# Patient Record
Sex: FEMALE | Race: WHITE | NOT HISPANIC OR LATINO | Employment: FULL TIME | URBAN - METROPOLITAN AREA
[De-identification: names, ages, dates, MRNs, and addresses within clinical notes are randomized per-mention and may not be internally consistent; named-entity substitution may affect disease eponyms.]

---

## 2017-11-02 ENCOUNTER — HOSPITAL ENCOUNTER (OUTPATIENT)
Dept: ULTRASOUND IMAGING | Facility: HOSPITAL | Age: 27
Discharge: HOME | End: 2017-11-02
Attending: PHYSICIAN ASSISTANT

## 2017-11-02 ENCOUNTER — OFFICE VISIT - HEALTHEAST (OUTPATIENT)
Dept: FAMILY MEDICINE | Facility: CLINIC | Age: 27
End: 2017-11-02

## 2017-11-02 DIAGNOSIS — Z30.431 IUD CHECK UP: ICD-10-CM

## 2017-11-02 DIAGNOSIS — Z97.5 IUD (INTRAUTERINE DEVICE) IN PLACE: ICD-10-CM

## 2017-11-02 DIAGNOSIS — R87.613 PAPANICOLAOU SMEAR OF CERVIX WITH HIGH GRADE SQUAMOUS INTRAEPITHELIAL LESION (HGSIL): ICD-10-CM

## 2017-11-02 DIAGNOSIS — N89.8 VAGINAL DISCHARGE: ICD-10-CM

## 2017-11-08 LAB
HPV INTERPRETATION - HISTORICAL: NORMAL
HPV INTERPRETER - HISTORICAL: NORMAL

## 2017-11-10 LAB
BKR LAB AP ABNORMAL BLEEDING: NO
BKR LAB AP BIRTH CONTROL/HORMONES: ABNORMAL
BKR LAB AP CERVICAL APPEARANCE: NORMAL
BKR LAB AP GYN ADEQUACY: ABNORMAL
BKR LAB AP GYN INTERPRETATION: ABNORMAL
BKR LAB AP GYN OTHER FINDINGS: ABNORMAL
BKR LAB AP HPV REFLEX: ABNORMAL
BKR LAB AP LMP: ABNORMAL
BKR LAB AP PATIENT STATUS: ABNORMAL
BKR LAB AP PREVIOUS ABNORMAL: ABNORMAL
BKR LAB AP PREVIOUS NORMAL: 2015
HIGH RISK?: NO
PATH REPORT.COMMENTS IMP SPEC: ABNORMAL
RESULT FLAG (HE HISTORICAL CONVERSION): ABNORMAL

## 2017-11-17 ENCOUNTER — AMBULATORY - HEALTHEAST (OUTPATIENT)
Dept: FAMILY MEDICINE | Facility: CLINIC | Age: 27
End: 2017-11-17

## 2017-11-17 ENCOUNTER — COMMUNICATION - HEALTHEAST (OUTPATIENT)
Dept: FAMILY MEDICINE | Facility: CLINIC | Age: 27
End: 2017-11-17

## 2017-11-17 DIAGNOSIS — R87.619 ABNORMAL PAP SMEAR OF CERVIX: ICD-10-CM

## 2017-11-29 ENCOUNTER — RECORDS - HEALTHEAST (OUTPATIENT)
Dept: ADMINISTRATIVE | Facility: OTHER | Age: 27
End: 2017-11-29

## 2017-12-06 ENCOUNTER — RECORDS - HEALTHEAST (OUTPATIENT)
Dept: ADMINISTRATIVE | Facility: OTHER | Age: 27
End: 2017-12-06

## 2017-12-18 ENCOUNTER — RECORDS - HEALTHEAST (OUTPATIENT)
Dept: ADMINISTRATIVE | Facility: OTHER | Age: 27
End: 2017-12-18

## 2017-12-26 ASSESSMENT — MIFFLIN-ST. JEOR: SCORE: 1373.6

## 2017-12-27 ENCOUNTER — ANESTHESIA - HEALTHEAST (OUTPATIENT)
Dept: SURGERY | Facility: HOSPITAL | Age: 27
End: 2017-12-27

## 2017-12-28 ENCOUNTER — SURGERY - HEALTHEAST (OUTPATIENT)
Dept: SURGERY | Facility: HOSPITAL | Age: 27
End: 2017-12-28

## 2018-01-16 ASSESSMENT — MIFFLIN-ST. JEOR: SCORE: 1373.6

## 2018-01-17 ENCOUNTER — ANESTHESIA - HEALTHEAST (OUTPATIENT)
Dept: SURGERY | Facility: HOSPITAL | Age: 28
End: 2018-01-17

## 2018-01-18 ENCOUNTER — SURGERY - HEALTHEAST (OUTPATIENT)
Dept: SURGERY | Facility: HOSPITAL | Age: 28
End: 2018-01-18

## 2018-01-18 ASSESSMENT — MIFFLIN-ST. JEOR: SCORE: 1365.66

## 2020-01-14 ENCOUNTER — OFFICE VISIT - HEALTHEAST (OUTPATIENT)
Dept: FAMILY MEDICINE | Facility: CLINIC | Age: 30
End: 2020-01-14

## 2020-01-14 DIAGNOSIS — R53.82 CHRONIC FATIGUE: ICD-10-CM

## 2020-01-14 DIAGNOSIS — I10 BENIGN ESSENTIAL HYPERTENSION: ICD-10-CM

## 2020-01-14 DIAGNOSIS — F41.9 ANXIETY: ICD-10-CM

## 2020-01-14 DIAGNOSIS — J45.20 MILD INTERMITTENT ASTHMA WITHOUT COMPLICATION: ICD-10-CM

## 2020-01-14 LAB
ALBUMIN SERPL-MCNC: 3.8 G/DL (ref 3.5–5)
ALP SERPL-CCNC: 96 U/L (ref 45–120)
ALT SERPL W P-5'-P-CCNC: 24 U/L (ref 0–45)
ANION GAP SERPL CALCULATED.3IONS-SCNC: 11 MMOL/L (ref 5–18)
AST SERPL W P-5'-P-CCNC: 17 U/L (ref 0–40)
BASOPHILS # BLD AUTO: 0.1 THOU/UL (ref 0–0.2)
BASOPHILS NFR BLD AUTO: 1 % (ref 0–2)
BILIRUB SERPL-MCNC: 0.4 MG/DL (ref 0–1)
BUN SERPL-MCNC: 17 MG/DL (ref 8–22)
CALCIUM SERPL-MCNC: 9.1 MG/DL (ref 8.5–10.5)
CHLORIDE BLD-SCNC: 104 MMOL/L (ref 98–107)
CO2 SERPL-SCNC: 24 MMOL/L (ref 22–31)
CREAT SERPL-MCNC: 0.72 MG/DL (ref 0.6–1.1)
EOSINOPHIL # BLD AUTO: 0.3 THOU/UL (ref 0–0.4)
EOSINOPHIL NFR BLD AUTO: 4 % (ref 0–6)
ERYTHROCYTE [DISTWIDTH] IN BLOOD BY AUTOMATED COUNT: 11.4 % (ref 11–14.5)
FSH SERPL-ACNC: 9.2 MIU/ML
GFR SERPL CREATININE-BSD FRML MDRD: >60 ML/MIN/1.73M2
GLUCOSE BLD-MCNC: 92 MG/DL (ref 70–125)
HCT VFR BLD AUTO: 35.5 % (ref 35–47)
HGB BLD-MCNC: 11.9 G/DL (ref 12–16)
LYMPHOCYTES # BLD AUTO: 2.5 THOU/UL (ref 0.8–4.4)
LYMPHOCYTES NFR BLD AUTO: 37 % (ref 20–40)
MCH RBC QN AUTO: 28.9 PG (ref 27–34)
MCHC RBC AUTO-ENTMCNC: 33.4 G/DL (ref 32–36)
MCV RBC AUTO: 86 FL (ref 80–100)
MONOCYTES # BLD AUTO: 0.6 THOU/UL (ref 0–0.9)
MONOCYTES NFR BLD AUTO: 9 % (ref 2–10)
NEUTROPHILS # BLD AUTO: 3.4 THOU/UL (ref 2–7.7)
NEUTROPHILS NFR BLD AUTO: 50 % (ref 50–70)
PLATELET # BLD AUTO: 250 THOU/UL (ref 140–440)
PMV BLD AUTO: 9.3 FL (ref 7–10)
POTASSIUM BLD-SCNC: 3.4 MMOL/L (ref 3.5–5)
PROT SERPL-MCNC: 7.4 G/DL (ref 6–8)
RBC # BLD AUTO: 4.1 MILL/UL (ref 3.8–5.4)
SODIUM SERPL-SCNC: 139 MMOL/L (ref 136–145)
TSH SERPL DL<=0.005 MIU/L-ACNC: 0.4 UIU/ML (ref 0.3–5)
VIT B12 SERPL-MCNC: 663 PG/ML (ref 213–816)
WBC: 6.7 THOU/UL (ref 4–11)

## 2020-01-14 ASSESSMENT — PATIENT HEALTH QUESTIONNAIRE - PHQ9: SUM OF ALL RESPONSES TO PHQ QUESTIONS 1-9: 13

## 2020-01-14 ASSESSMENT — MIFFLIN-ST. JEOR: SCORE: 1460.91

## 2020-01-15 LAB — 25(OH)D3 SERPL-MCNC: 12.7 NG/ML (ref 30–80)

## 2020-02-04 ENCOUNTER — AMBULATORY - HEALTHEAST (OUTPATIENT)
Dept: FAMILY MEDICINE | Facility: CLINIC | Age: 30
End: 2020-02-04

## 2020-02-04 ENCOUNTER — COMMUNICATION - HEALTHEAST (OUTPATIENT)
Dept: FAMILY MEDICINE | Facility: CLINIC | Age: 30
End: 2020-02-04

## 2020-02-04 DIAGNOSIS — E55.9 VITAMIN D DEFICIENCY: ICD-10-CM

## 2020-03-09 ENCOUNTER — OFFICE VISIT - HEALTHEAST (OUTPATIENT)
Dept: FAMILY MEDICINE | Facility: CLINIC | Age: 30
End: 2020-03-09

## 2020-03-09 DIAGNOSIS — E55.9 VITAMIN D DEFICIENCY: ICD-10-CM

## 2020-03-09 DIAGNOSIS — G43.109 MIGRAINE WITH AURA AND WITHOUT STATUS MIGRAINOSUS, NOT INTRACTABLE: ICD-10-CM

## 2020-03-09 DIAGNOSIS — G56.03 BILATERAL CARPAL TUNNEL SYNDROME: ICD-10-CM

## 2020-03-25 ENCOUNTER — COMMUNICATION - HEALTHEAST (OUTPATIENT)
Dept: SCHEDULING | Facility: CLINIC | Age: 30
End: 2020-03-25

## 2020-03-25 ENCOUNTER — AMBULATORY - HEALTHEAST (OUTPATIENT)
Dept: FAMILY MEDICINE | Facility: CLINIC | Age: 30
End: 2020-03-25

## 2020-03-25 DIAGNOSIS — I10 BENIGN ESSENTIAL HYPERTENSION: ICD-10-CM

## 2020-05-04 ENCOUNTER — OFFICE VISIT - HEALTHEAST (OUTPATIENT)
Dept: FAMILY MEDICINE | Facility: CLINIC | Age: 30
End: 2020-05-04

## 2020-05-04 ENCOUNTER — COMMUNICATION - HEALTHEAST (OUTPATIENT)
Dept: SCHEDULING | Facility: CLINIC | Age: 30
End: 2020-05-04

## 2020-05-04 DIAGNOSIS — N39.0 URINARY TRACT INFECTION WITHOUT HEMATURIA, SITE UNSPECIFIED: ICD-10-CM

## 2020-05-04 DIAGNOSIS — B37.31 YEAST VAGINITIS: ICD-10-CM

## 2020-05-04 DIAGNOSIS — G43.109 MIGRAINE WITH AURA AND WITHOUT STATUS MIGRAINOSUS, NOT INTRACTABLE: ICD-10-CM

## 2020-05-15 ENCOUNTER — COMMUNICATION - HEALTHEAST (OUTPATIENT)
Dept: FAMILY MEDICINE | Facility: CLINIC | Age: 30
End: 2020-05-15

## 2020-05-15 DIAGNOSIS — F41.9 ANXIETY: ICD-10-CM

## 2020-08-20 ENCOUNTER — COMMUNICATION - HEALTHEAST (OUTPATIENT)
Dept: SCHEDULING | Facility: CLINIC | Age: 30
End: 2020-08-20

## 2020-08-22 ENCOUNTER — AMBULATORY - HEALTHEAST (OUTPATIENT)
Dept: OTHER | Facility: CLINIC | Age: 30
End: 2020-08-22

## 2020-08-24 ENCOUNTER — COMMUNICATION - HEALTHEAST (OUTPATIENT)
Dept: FAMILY MEDICINE | Facility: CLINIC | Age: 30
End: 2020-08-24

## 2020-08-25 ENCOUNTER — COMMUNICATION - HEALTHEAST (OUTPATIENT)
Dept: FAMILY MEDICINE | Facility: CLINIC | Age: 30
End: 2020-08-25

## 2020-08-28 ENCOUNTER — OFFICE VISIT - HEALTHEAST (OUTPATIENT)
Dept: FAMILY MEDICINE | Facility: CLINIC | Age: 30
End: 2020-08-28

## 2020-08-28 DIAGNOSIS — R10.12 LUQ ABDOMINAL PAIN: ICD-10-CM

## 2020-08-28 DIAGNOSIS — K52.9 ACUTE COLITIS: ICD-10-CM

## 2020-08-28 DIAGNOSIS — R10.9 RECURRENT ABDOMINAL PAIN: ICD-10-CM

## 2020-08-28 DIAGNOSIS — I10 BENIGN ESSENTIAL HYPERTENSION: ICD-10-CM

## 2020-08-28 ASSESSMENT — PATIENT HEALTH QUESTIONNAIRE - PHQ9: SUM OF ALL RESPONSES TO PHQ QUESTIONS 1-9: 10

## 2020-09-14 ENCOUNTER — COMMUNICATION - HEALTHEAST (OUTPATIENT)
Dept: FAMILY MEDICINE | Facility: CLINIC | Age: 30
End: 2020-09-14

## 2020-09-14 DIAGNOSIS — F41.9 ANXIETY: ICD-10-CM

## 2020-09-16 ENCOUNTER — RECORDS - HEALTHEAST (OUTPATIENT)
Dept: ADMINISTRATIVE | Facility: OTHER | Age: 30
End: 2020-09-16

## 2020-10-07 ENCOUNTER — OFFICE VISIT - HEALTHEAST (OUTPATIENT)
Dept: FAMILY MEDICINE | Facility: CLINIC | Age: 30
End: 2020-10-07

## 2020-10-07 DIAGNOSIS — R87.613 PAPANICOLAOU SMEAR OF CERVIX WITH HIGH GRADE SQUAMOUS INTRAEPITHELIAL LESION (HGSIL): ICD-10-CM

## 2020-10-07 DIAGNOSIS — N89.8 VAGINAL DISCHARGE: ICD-10-CM

## 2020-10-07 DIAGNOSIS — R33.9 URINARY RETENTION: ICD-10-CM

## 2020-10-07 DIAGNOSIS — Z90.710 H/O TOTAL HYSTERECTOMY: ICD-10-CM

## 2020-10-07 LAB
ALBUMIN UR-MCNC: ABNORMAL MG/DL
APPEARANCE UR: ABNORMAL
BILIRUB UR QL STRIP: NEGATIVE
CLUE CELLS: NORMAL
COLOR UR AUTO: YELLOW
GLUCOSE UR STRIP-MCNC: NEGATIVE MG/DL
HGB UR QL STRIP: NEGATIVE
KETONES UR STRIP-MCNC: NEGATIVE MG/DL
LEUKOCYTE ESTERASE UR QL STRIP: ABNORMAL
NITRATE UR QL: NEGATIVE
PH UR STRIP: 5 [PH] (ref 5–8)
SP GR UR STRIP: >=1.03 (ref 1–1.03)
TRICHOMONAS, WET PREP: NORMAL
UROBILINOGEN UR STRIP-ACNC: ABNORMAL
YEAST, WET PREP: NORMAL

## 2020-10-08 LAB — BACTERIA SPEC CULT: NO GROWTH

## 2020-10-09 ENCOUNTER — AMBULATORY - HEALTHEAST (OUTPATIENT)
Dept: FAMILY MEDICINE | Facility: CLINIC | Age: 30
End: 2020-10-09

## 2020-10-09 DIAGNOSIS — B96.89 BACTERIAL VAGINOSIS: ICD-10-CM

## 2020-10-09 DIAGNOSIS — N76.0 BACTERIAL VAGINOSIS: ICD-10-CM

## 2021-01-18 ENCOUNTER — COMMUNICATION - HEALTHEAST (OUTPATIENT)
Dept: FAMILY MEDICINE | Facility: CLINIC | Age: 31
End: 2021-01-18

## 2021-01-18 DIAGNOSIS — F41.9 ANXIETY: ICD-10-CM

## 2021-01-19 ENCOUNTER — OFFICE VISIT - HEALTHEAST (OUTPATIENT)
Dept: FAMILY MEDICINE | Facility: CLINIC | Age: 31
End: 2021-01-19

## 2021-01-19 ENCOUNTER — COMMUNICATION - HEALTHEAST (OUTPATIENT)
Dept: FAMILY MEDICINE | Facility: CLINIC | Age: 31
End: 2021-01-19

## 2021-01-19 DIAGNOSIS — G43.109 MIGRAINE WITH AURA AND WITHOUT STATUS MIGRAINOSUS, NOT INTRACTABLE: ICD-10-CM

## 2021-01-19 DIAGNOSIS — F41.9 ANXIETY: ICD-10-CM

## 2021-01-19 DIAGNOSIS — Z00.00 ROUTINE GENERAL MEDICAL EXAMINATION AT A HEALTH CARE FACILITY: ICD-10-CM

## 2021-01-19 DIAGNOSIS — R00.2 PALPITATIONS: ICD-10-CM

## 2021-01-19 DIAGNOSIS — R42 VERTIGO: ICD-10-CM

## 2021-01-19 DIAGNOSIS — R07.1 CHEST PAIN ON BREATHING: ICD-10-CM

## 2021-01-19 DIAGNOSIS — Z86.2 HISTORY OF THROMBOCYTOPENIA: ICD-10-CM

## 2021-01-19 DIAGNOSIS — R53.83 LOW ENERGY: ICD-10-CM

## 2021-01-19 DIAGNOSIS — F32.2 SEVERE MAJOR DEPRESSION (H): ICD-10-CM

## 2021-01-19 DIAGNOSIS — Z63.79 STRESSFUL LIFE EVENTS AFFECTING FAMILY AND HOUSEHOLD: ICD-10-CM

## 2021-01-19 LAB
ALBUMIN SERPL-MCNC: 4.4 G/DL (ref 3.5–5)
ALP SERPL-CCNC: 73 U/L (ref 45–120)
ALT SERPL W P-5'-P-CCNC: 17 U/L (ref 0–45)
ANION GAP SERPL CALCULATED.3IONS-SCNC: 10 MMOL/L (ref 5–18)
AST SERPL W P-5'-P-CCNC: 14 U/L (ref 0–40)
BASOPHILS # BLD AUTO: 0.1 THOU/UL (ref 0–0.2)
BASOPHILS NFR BLD AUTO: 1 % (ref 0–2)
BILIRUB SERPL-MCNC: 0.3 MG/DL (ref 0–1)
BUN SERPL-MCNC: 19 MG/DL (ref 8–22)
CALCIUM SERPL-MCNC: 8.8 MG/DL (ref 8.5–10.5)
CHLORIDE BLD-SCNC: 106 MMOL/L (ref 98–107)
CHOLEST SERPL-MCNC: 171 MG/DL
CO2 SERPL-SCNC: 21 MMOL/L (ref 22–31)
CREAT SERPL-MCNC: 0.82 MG/DL (ref 0.6–1.1)
EOSINOPHIL # BLD AUTO: 0.2 THOU/UL (ref 0–0.4)
EOSINOPHIL NFR BLD AUTO: 2 % (ref 0–6)
ERYTHROCYTE [DISTWIDTH] IN BLOOD BY AUTOMATED COUNT: 11.1 % (ref 11–14.5)
FASTING STATUS PATIENT QL REPORTED: NO
GFR SERPL CREATININE-BSD FRML MDRD: >60 ML/MIN/1.73M2
GLUCOSE BLD-MCNC: 83 MG/DL (ref 70–125)
HCT VFR BLD AUTO: 39.3 % (ref 35–47)
HDLC SERPL-MCNC: 48 MG/DL
HGB BLD-MCNC: 13.5 G/DL (ref 12–16)
LDLC SERPL CALC-MCNC: 98 MG/DL
LYMPHOCYTES # BLD AUTO: 3 THOU/UL (ref 0.8–4.4)
LYMPHOCYTES NFR BLD AUTO: 37 % (ref 20–40)
MCH RBC QN AUTO: 30.6 PG (ref 27–34)
MCHC RBC AUTO-ENTMCNC: 34.3 G/DL (ref 32–36)
MCV RBC AUTO: 89 FL (ref 80–100)
MONOCYTES # BLD AUTO: 0.5 THOU/UL (ref 0–0.9)
MONOCYTES NFR BLD AUTO: 7 % (ref 2–10)
NEUTROPHILS # BLD AUTO: 4.3 THOU/UL (ref 2–7.7)
NEUTROPHILS NFR BLD AUTO: 54 % (ref 50–70)
PLATELET # BLD AUTO: 265 THOU/UL (ref 140–440)
PMV BLD AUTO: 8.7 FL (ref 7–10)
POTASSIUM BLD-SCNC: 4.1 MMOL/L (ref 3.5–5)
PROT SERPL-MCNC: 7.4 G/DL (ref 6–8)
RBC # BLD AUTO: 4.41 MILL/UL (ref 3.8–5.4)
SODIUM SERPL-SCNC: 137 MMOL/L (ref 136–145)
TRIGL SERPL-MCNC: 126 MG/DL
TSH SERPL DL<=0.005 MIU/L-ACNC: 0.71 UIU/ML (ref 0.3–5)
WBC: 8 THOU/UL (ref 4–11)

## 2021-01-19 ASSESSMENT — MIFFLIN-ST. JEOR: SCORE: 1460.91

## 2021-01-25 ENCOUNTER — OFFICE VISIT - HEALTHEAST (OUTPATIENT)
Dept: FAMILY MEDICINE | Facility: CLINIC | Age: 31
End: 2021-01-25

## 2021-01-25 ENCOUNTER — COMMUNICATION - HEALTHEAST (OUTPATIENT)
Dept: FAMILY MEDICINE | Facility: CLINIC | Age: 31
End: 2021-01-25

## 2021-01-25 DIAGNOSIS — J06.9 UPPER RESPIRATORY TRACT INFECTION, UNSPECIFIED TYPE: ICD-10-CM

## 2021-01-25 DIAGNOSIS — J45.20 MILD INTERMITTENT ASTHMA WITHOUT COMPLICATION: ICD-10-CM

## 2021-01-25 DIAGNOSIS — J02.9 PHARYNGITIS, UNSPECIFIED ETIOLOGY: ICD-10-CM

## 2021-01-26 ENCOUNTER — AMBULATORY - HEALTHEAST (OUTPATIENT)
Dept: FAMILY MEDICINE | Facility: CLINIC | Age: 31
End: 2021-01-26

## 2021-01-26 DIAGNOSIS — J06.9 UPPER RESPIRATORY TRACT INFECTION, UNSPECIFIED TYPE: ICD-10-CM

## 2021-01-26 DIAGNOSIS — J02.9 PHARYNGITIS, UNSPECIFIED ETIOLOGY: ICD-10-CM

## 2021-01-26 LAB — DEPRECATED S PYO AG THROAT QL EIA: ABNORMAL

## 2021-01-27 ENCOUNTER — COMMUNICATION - HEALTHEAST (OUTPATIENT)
Dept: SCHEDULING | Facility: CLINIC | Age: 31
End: 2021-01-27

## 2021-01-27 LAB
SARS-COV-2 PCR COMMENT: NORMAL
SARS-COV-2 RNA SPEC QL NAA+PROBE: NEGATIVE
SARS-COV-2 VIRUS SPECIMEN SOURCE: NORMAL

## 2021-01-28 ENCOUNTER — AMBULATORY - HEALTHEAST (OUTPATIENT)
Dept: CARDIOLOGY | Facility: CLINIC | Age: 31
End: 2021-01-28

## 2021-01-29 ENCOUNTER — COMMUNICATION - HEALTHEAST (OUTPATIENT)
Dept: CARDIOLOGY | Facility: CLINIC | Age: 31
End: 2021-01-29

## 2021-01-30 ENCOUNTER — HOSPITAL ENCOUNTER (OUTPATIENT)
Dept: MRI IMAGING | Facility: HOSPITAL | Age: 31
Discharge: HOME | End: 2021-01-30
Attending: FAMILY MEDICINE

## 2021-01-30 DIAGNOSIS — G43.109 MIGRAINE WITH AURA AND WITHOUT STATUS MIGRAINOSUS, NOT INTRACTABLE: ICD-10-CM

## 2021-01-30 DIAGNOSIS — R42 VERTIGO: ICD-10-CM

## 2021-02-01 ENCOUNTER — OFFICE VISIT - HEALTHEAST (OUTPATIENT)
Dept: CARDIOLOGY | Facility: CLINIC | Age: 31
End: 2021-02-01

## 2021-02-01 DIAGNOSIS — R00.0 SINUS TACHYCARDIA: ICD-10-CM

## 2021-02-01 DIAGNOSIS — R00.2 PALPITATIONS: ICD-10-CM

## 2021-02-01 DIAGNOSIS — Z87.891 PERSONAL HISTORY OF TOBACCO USE, PRESENTING HAZARDS TO HEALTH: ICD-10-CM

## 2021-02-01 DIAGNOSIS — R42 DIZZINESS: ICD-10-CM

## 2021-02-01 DIAGNOSIS — I10 BENIGN ESSENTIAL HYPERTENSION: ICD-10-CM

## 2021-02-01 DIAGNOSIS — F41.9 ANXIETY: ICD-10-CM

## 2021-02-01 ASSESSMENT — MIFFLIN-ST. JEOR: SCORE: 1451.84

## 2021-02-22 ENCOUNTER — HOSPITAL ENCOUNTER (OUTPATIENT)
Dept: CARDIOLOGY | Facility: HOSPITAL | Age: 31
Discharge: HOME | End: 2021-02-22
Attending: GENERAL ACUTE CARE HOSPITAL

## 2021-02-22 DIAGNOSIS — R00.2 PALPITATIONS: ICD-10-CM

## 2021-02-22 DIAGNOSIS — R42 DIZZINESS: ICD-10-CM

## 2021-02-22 LAB
AORTIC ROOT: 2.7 CM
AORTIC VALVE MEAN VELOCITY: 90.1 CM/S
ASCENDING AORTA: 2.6 CM
AV DIMENSIONLESS INDEX VTI: 1
AV MEAN GRADIENT: 4 MMHG
AV PEAK GRADIENT: 7 MMHG
AV VALVE AREA: 2.9 CM2
AV VELOCITY RATIO: 1
BSA FOR ECHO PROCEDURE: 1.83 M2
CV BLOOD PRESSURE: ABNORMAL MMHG
CV ECHO HEIGHT: 66.5 IN
CV ECHO WEIGHT: 157 LBS
DOP CALC AO PEAK VEL: 132 CM/S
DOP CALC AO VTI: 26.8 CM
DOP CALC LVOT AREA: 2.83 CM2
DOP CALC LVOT DIAMETER: 1.9 CM
DOP CALC LVOT PEAK VEL: 135 CM/S
DOP CALC LVOT STROKE VOLUME: 77.6 CM3
DOP CALCLVOT PEAK VEL VTI: 27.4 CM
EJECTION FRACTION: 55 % (ref 55–75)
FRACTIONAL SHORTENING: 50 % (ref 28–44)
INTERVENTRICULAR SEPTUM IN END DIASTOLE: 0.99 CM (ref 0.6–0.9)
IVS/PW RATIO: 0.9
LA AREA 1: 17 CM2
LA AREA 2: 17.5 CM2
LEFT ATRIUM LENGTH: 5.1 CM
LEFT ATRIUM SIZE: 3.2 CM
LEFT ATRIUM TO AORTIC ROOT RATIO: 1.19 NO UNITS
LEFT ATRIUM VOLUME INDEX: 27.1 ML/M2
LEFT ATRIUM VOLUME: 49.6 ML
LEFT VENTRICLE CARDIAC INDEX: 3 L/MIN/M2
LEFT VENTRICLE CARDIAC OUTPUT: 5.4 L/MIN
LEFT VENTRICLE DIASTOLIC VOLUME INDEX: 39.1 CM3/M2 (ref 29–61)
LEFT VENTRICLE DIASTOLIC VOLUME: 71.5 CM3 (ref 46–106)
LEFT VENTRICLE HEART RATE: 70 BPM
LEFT VENTRICLE MASS INDEX: 69.4 G/M2
LEFT VENTRICLE SYSTOLIC VOLUME INDEX: 17.7 CM3/M2 (ref 8–24)
LEFT VENTRICLE SYSTOLIC VOLUME: 32.4 CM3 (ref 14–42)
LEFT VENTRICULAR INTERNAL DIMENSION IN DIASTOLE: 3.82 CM (ref 3.8–5.2)
LEFT VENTRICULAR INTERNAL DIMENSION IN SYSTOLE: 1.91 CM (ref 2.2–3.5)
LEFT VENTRICULAR MASS: 127 G
LEFT VENTRICULAR OUTFLOW TRACT MEAN GRADIENT: 4 MMHG
LEFT VENTRICULAR OUTFLOW TRACT MEAN VELOCITY: 92.4 CM/S
LEFT VENTRICULAR OUTFLOW TRACT PEAK GRADIENT: 7 MMHG
LEFT VENTRICULAR POSTERIOR WALL IN END DIASTOLE: 1.11 CM (ref 0.6–0.9)
LV STROKE VOLUME INDEX: 42.4 ML/M2
MITRAL VALVE DECELERATION SLOPE: 4820 MM/S2
MITRAL VALVE E/A RATIO: 1.5
MITRAL VALVE PRESSURE HALF-TIME: 60 MS
MV AVERAGE E/E' RATIO: 6.7 CM/S
MV DECELERATION TIME: 204 MS
MV E'TISSUE VEL-LAT: 17.5 CM/S
MV E'TISSUE VEL-MED: 11.8 CM/S
MV LATERAL E/E' RATIO: 5.6
MV MEDIAL E/E' RATIO: 8.3
MV PEAK A VELOCITY: 65.6 CM/S
MV PEAK E VELOCITY: 98.5 CM/S
MV VALVE AREA PRESSURE 1/2 METHOD: 3.7 CM2
NUC REST DIASTOLIC VOLUME INDEX: 2512 LBS
NUC REST SYSTOLIC VOLUME INDEX: 66.5 IN
TRICUSPID VALVE ANULAR PLANE SYSTOLIC EXCURSION: 2.7 CM

## 2021-02-22 ASSESSMENT — MIFFLIN-ST. JEOR: SCORE: 1451.84

## 2021-02-28 ENCOUNTER — COMMUNICATION - HEALTHEAST (OUTPATIENT)
Dept: CARDIOLOGY | Facility: CLINIC | Age: 31
End: 2021-02-28

## 2021-04-01 ENCOUNTER — AMBULATORY - HEALTHEAST (OUTPATIENT)
Dept: FAMILY MEDICINE | Facility: CLINIC | Age: 31
End: 2021-04-01

## 2021-04-01 ENCOUNTER — COMMUNICATION - HEALTHEAST (OUTPATIENT)
Dept: FAMILY MEDICINE | Facility: CLINIC | Age: 31
End: 2021-04-01

## 2021-04-01 ENCOUNTER — OFFICE VISIT - HEALTHEAST (OUTPATIENT)
Dept: FAMILY MEDICINE | Facility: CLINIC | Age: 31
End: 2021-04-01

## 2021-04-01 DIAGNOSIS — Z20.822 EXPOSURE TO COVID-19 VIRUS: ICD-10-CM

## 2021-04-01 DIAGNOSIS — Z90.710 STATUS POST TAH-BSO: ICD-10-CM

## 2021-04-01 DIAGNOSIS — Z90.79 STATUS POST TAH-BSO: ICD-10-CM

## 2021-04-01 DIAGNOSIS — D06.9 CARCINOMA IN SITU OF CERVIX, UNSPECIFIED LOCATION: ICD-10-CM

## 2021-04-01 DIAGNOSIS — Z90.722 STATUS POST TAH-BSO: ICD-10-CM

## 2021-04-01 ASSESSMENT — PATIENT HEALTH QUESTIONNAIRE - PHQ9: SUM OF ALL RESPONSES TO PHQ QUESTIONS 1-9: 3

## 2021-04-02 ENCOUNTER — COMMUNICATION - HEALTHEAST (OUTPATIENT)
Dept: SCHEDULING | Facility: CLINIC | Age: 31
End: 2021-04-02

## 2021-04-03 ENCOUNTER — COMMUNICATION - HEALTHEAST (OUTPATIENT)
Dept: SCHEDULING | Facility: CLINIC | Age: 31
End: 2021-04-03

## 2021-04-05 ENCOUNTER — COMMUNICATION - HEALTHEAST (OUTPATIENT)
Dept: SCHEDULING | Facility: CLINIC | Age: 31
End: 2021-04-05

## 2021-04-07 ENCOUNTER — COMMUNICATION - HEALTHEAST (OUTPATIENT)
Dept: FAMILY MEDICINE | Facility: CLINIC | Age: 31
End: 2021-04-07

## 2021-04-07 ENCOUNTER — AMBULATORY - HEALTHEAST (OUTPATIENT)
Dept: FAMILY MEDICINE | Facility: CLINIC | Age: 31
End: 2021-04-07

## 2021-04-07 DIAGNOSIS — Z20.822 EXPOSURE TO COVID-19 VIRUS: ICD-10-CM

## 2021-04-08 ENCOUNTER — AMBULATORY - HEALTHEAST (OUTPATIENT)
Dept: FAMILY MEDICINE | Facility: CLINIC | Age: 31
End: 2021-04-08

## 2021-04-22 ENCOUNTER — OFFICE VISIT - HEALTHEAST (OUTPATIENT)
Dept: FAMILY MEDICINE | Facility: CLINIC | Age: 31
End: 2021-04-22

## 2021-04-22 DIAGNOSIS — G47.00 INSOMNIA, UNSPECIFIED TYPE: ICD-10-CM

## 2021-04-22 DIAGNOSIS — F41.9 ANXIETY: ICD-10-CM

## 2021-04-22 DIAGNOSIS — N39.0 URINARY TRACT INFECTION WITHOUT HEMATURIA, SITE UNSPECIFIED: ICD-10-CM

## 2021-04-22 DIAGNOSIS — N76.0 BACTERIAL VAGINITIS: ICD-10-CM

## 2021-04-22 DIAGNOSIS — B96.89 BACTERIAL VAGINITIS: ICD-10-CM

## 2021-04-22 LAB
ALBUMIN UR-MCNC: NEGATIVE G/DL
APPEARANCE UR: CLEAR
BILIRUB UR QL STRIP: NEGATIVE
COLOR UR AUTO: YELLOW
GLUCOSE UR STRIP-MCNC: NEGATIVE MG/DL
HGB UR QL STRIP: NEGATIVE
KETONES UR STRIP-MCNC: NEGATIVE MG/DL
LEUKOCYTE ESTERASE UR QL STRIP: NEGATIVE
NITRATE UR QL: NEGATIVE
PH UR STRIP: 5.5 [PH] (ref 5–8)
SP GR UR STRIP: 1.01 (ref 1–1.03)
UROBILINOGEN UR STRIP-ACNC: NORMAL

## 2021-05-03 ENCOUNTER — OFFICE VISIT - HEALTHEAST (OUTPATIENT)
Dept: FAMILY MEDICINE | Facility: CLINIC | Age: 31
End: 2021-05-03

## 2021-05-03 DIAGNOSIS — D06.9 CARCINOMA IN SITU OF CERVIX, UNSPECIFIED LOCATION: ICD-10-CM

## 2021-05-03 DIAGNOSIS — R00.2 PALPITATIONS: ICD-10-CM

## 2021-05-03 DIAGNOSIS — Z00.00 ROUTINE GENERAL MEDICAL EXAMINATION AT A HEALTH CARE FACILITY: ICD-10-CM

## 2021-05-03 DIAGNOSIS — N89.8 VAGINAL DISCHARGE: ICD-10-CM

## 2021-05-03 DIAGNOSIS — F41.9 ANXIETY: ICD-10-CM

## 2021-05-03 DIAGNOSIS — F17.210 CIGARETTE NICOTINE DEPENDENCE WITHOUT COMPLICATION: ICD-10-CM

## 2021-05-03 DIAGNOSIS — L65.9 HAIR LOSS: ICD-10-CM

## 2021-05-03 DIAGNOSIS — M79.89 SWELLING OF LOWER EXTREMITY: ICD-10-CM

## 2021-05-03 LAB
ALBUMIN SERPL-MCNC: 3.8 G/DL (ref 3.5–5)
ALP SERPL-CCNC: 90 U/L (ref 45–120)
ALT SERPL W P-5'-P-CCNC: 49 U/L (ref 0–45)
ANION GAP SERPL CALCULATED.3IONS-SCNC: 12 MMOL/L (ref 5–18)
AST SERPL W P-5'-P-CCNC: 45 U/L (ref 0–40)
BASOPHILS # BLD AUTO: 0.1 THOU/UL (ref 0–0.2)
BASOPHILS NFR BLD AUTO: 1 % (ref 0–2)
BILIRUB SERPL-MCNC: 0.3 MG/DL (ref 0–1)
BUN SERPL-MCNC: 13 MG/DL (ref 8–22)
CALCIUM SERPL-MCNC: 9 MG/DL (ref 8.5–10.5)
CHLORIDE BLD-SCNC: 108 MMOL/L (ref 98–107)
CLUE CELLS: NORMAL
CO2 SERPL-SCNC: 22 MMOL/L (ref 22–31)
CREAT SERPL-MCNC: 0.8 MG/DL (ref 0.6–1.1)
EOSINOPHIL # BLD AUTO: 0.2 THOU/UL (ref 0–0.4)
EOSINOPHIL NFR BLD AUTO: 2 % (ref 0–6)
ERYTHROCYTE [DISTWIDTH] IN BLOOD BY AUTOMATED COUNT: 11.7 % (ref 11–14.5)
GFR SERPL CREATININE-BSD FRML MDRD: >60 ML/MIN/1.73M2
GLUCOSE BLD-MCNC: 92 MG/DL (ref 70–125)
HCT VFR BLD AUTO: 35.3 % (ref 35–47)
HGB BLD-MCNC: 11.9 G/DL (ref 12–16)
IMM GRANULOCYTES # BLD: 0 THOU/UL
IMM GRANULOCYTES NFR BLD: 0 %
LYMPHOCYTES # BLD AUTO: 2.1 THOU/UL (ref 0.8–4.4)
LYMPHOCYTES NFR BLD AUTO: 24 % (ref 20–40)
MAGNESIUM SERPL-MCNC: 2 MG/DL (ref 1.8–2.6)
MCH RBC QN AUTO: 29.8 PG (ref 27–34)
MCHC RBC AUTO-ENTMCNC: 33.7 G/DL (ref 32–36)
MCV RBC AUTO: 88 FL (ref 80–100)
MONOCYTES # BLD AUTO: 0.8 THOU/UL (ref 0–0.9)
MONOCYTES NFR BLD AUTO: 9 % (ref 2–10)
NEUTROPHILS # BLD AUTO: 5.8 THOU/UL (ref 2–7.7)
NEUTROPHILS NFR BLD AUTO: 64 % (ref 50–70)
PLATELET # BLD AUTO: 241 THOU/UL (ref 140–440)
PMV BLD AUTO: 10.8 FL (ref 7–10)
POTASSIUM BLD-SCNC: 3.8 MMOL/L (ref 3.5–5)
PROT SERPL-MCNC: 6.8 G/DL (ref 6–8)
RBC # BLD AUTO: 4 MILL/UL (ref 3.8–5.4)
SODIUM SERPL-SCNC: 142 MMOL/L (ref 136–145)
TRICHOMONAS, WET PREP: NORMAL
TSH SERPL DL<=0.005 MIU/L-ACNC: 0.49 UIU/ML (ref 0.3–5)
WBC: 9 THOU/UL (ref 4–11)
YEAST, WET PREP: NORMAL

## 2021-05-03 ASSESSMENT — MIFFLIN-ST. JEOR: SCORE: 1438.8

## 2021-05-04 LAB
C TRACH DNA SPEC QL PROBE+SIG AMP: NEGATIVE
HPV SOURCE: NORMAL
HUMAN PAPILLOMA VIRUS 16 DNA: NEGATIVE
HUMAN PAPILLOMA VIRUS 18 DNA: NEGATIVE
HUMAN PAPILLOMA VIRUS FINAL DIAGNOSIS: NORMAL
HUMAN PAPILLOMA VIRUS OTHER HR: NEGATIVE
N GONORRHOEA DNA SPEC QL NAA+PROBE: NEGATIVE
SPECIMEN DESCRIPTION: NORMAL

## 2021-05-05 LAB — ZINC SERPL-MCNC: 58.1 UG/DL (ref 60–120)

## 2021-05-10 LAB
BKR LAB AP ABNORMAL BLEEDING: NO
BKR LAB AP BIRTH CONTROL/HORMONES: NORMAL
BKR LAB AP CERVICAL APPEARANCE: NORMAL
BKR LAB AP GYN ADEQUACY: NORMAL
BKR LAB AP GYN INTERPRETATION: NORMAL
BKR LAB AP GYN OTHER FINDINGS: NORMAL
BKR LAB AP HPV REFLEX: NORMAL
BKR LAB AP LMP: NORMAL
BKR LAB AP PATIENT STATUS: NORMAL
BKR LAB AP PREVIOUS ABNORMAL: NORMAL
BKR LAB AP PREVIOUS NORMAL: NORMAL
HIGH RISK?: NO
PATH REPORT.COMMENTS IMP SPEC: NORMAL
RESULT FLAG (HE HISTORICAL CONVERSION): NORMAL

## 2021-05-11 ENCOUNTER — COMMUNICATION - HEALTHEAST (OUTPATIENT)
Dept: FAMILY MEDICINE | Facility: CLINIC | Age: 31
End: 2021-05-11

## 2021-05-11 ENCOUNTER — COMMUNICATION - HEALTHEAST (OUTPATIENT)
Dept: OBGYN | Facility: CLINIC | Age: 31
End: 2021-05-11

## 2021-05-25 ENCOUNTER — AMBULATORY - HEALTHEAST (OUTPATIENT)
Dept: NURSING | Facility: CLINIC | Age: 31
End: 2021-05-25

## 2021-05-26 ENCOUNTER — COMMUNICATION - HEALTHEAST (OUTPATIENT)
Dept: FAMILY MEDICINE | Facility: CLINIC | Age: 31
End: 2021-05-26

## 2021-05-26 ASSESSMENT — PATIENT HEALTH QUESTIONNAIRE - PHQ9
SUM OF ALL RESPONSES TO PHQ QUESTIONS 1-9: 10
SUM OF ALL RESPONSES TO PHQ QUESTIONS 1-9: 13

## 2021-05-27 VITALS
WEIGHT: 155 LBS | DIASTOLIC BLOOD PRESSURE: 78 MMHG | TEMPERATURE: 97.3 F | BODY MASS INDEX: 24.91 KG/M2 | HEART RATE: 88 BPM | SYSTOLIC BLOOD PRESSURE: 126 MMHG | HEIGHT: 66 IN | RESPIRATION RATE: 20 BRPM

## 2021-05-27 ASSESSMENT — PATIENT HEALTH QUESTIONNAIRE - PHQ9: SUM OF ALL RESPONSES TO PHQ QUESTIONS 1-9: 3

## 2021-05-28 ASSESSMENT — ASTHMA QUESTIONNAIRES
ACT_TOTALSCORE: 24
ACT_TOTALSCORE: 19
ACT_TOTALSCORE: 22
ACT_TOTALSCORE: 24

## 2021-05-31 VITALS — WEIGHT: 145 LBS | BODY MASS INDEX: 24.16 KG/M2 | HEIGHT: 65 IN

## 2021-05-31 VITALS — HEIGHT: 65 IN | BODY MASS INDEX: 23.87 KG/M2 | WEIGHT: 143.25 LBS

## 2021-05-31 VITALS — BODY MASS INDEX: 23.3 KG/M2 | WEIGHT: 140 LBS

## 2021-06-04 VITALS
TEMPERATURE: 98.2 F | OXYGEN SATURATION: 99 % | HEART RATE: 92 BPM | WEIGHT: 157 LBS | SYSTOLIC BLOOD PRESSURE: 125 MMHG | BODY MASS INDEX: 26.13 KG/M2 | DIASTOLIC BLOOD PRESSURE: 74 MMHG | RESPIRATION RATE: 16 BRPM

## 2021-06-04 VITALS
WEIGHT: 159 LBS | BODY MASS INDEX: 24.96 KG/M2 | DIASTOLIC BLOOD PRESSURE: 60 MMHG | HEART RATE: 80 BPM | HEIGHT: 67 IN | SYSTOLIC BLOOD PRESSURE: 120 MMHG

## 2021-06-04 VITALS
HEART RATE: 79 BPM | DIASTOLIC BLOOD PRESSURE: 84 MMHG | OXYGEN SATURATION: 98 % | RESPIRATION RATE: 16 BRPM | SYSTOLIC BLOOD PRESSURE: 150 MMHG | BODY MASS INDEX: 25.28 KG/M2 | WEIGHT: 159 LBS

## 2021-06-05 ENCOUNTER — RECORDS - HEALTHEAST (OUTPATIENT)
Dept: FAMILY MEDICINE | Facility: CLINIC | Age: 31
End: 2021-06-05

## 2021-06-05 VITALS
HEIGHT: 67 IN | WEIGHT: 157 LBS | BODY MASS INDEX: 24.64 KG/M2 | DIASTOLIC BLOOD PRESSURE: 76 MMHG | HEART RATE: 117 BPM | SYSTOLIC BLOOD PRESSURE: 136 MMHG | OXYGEN SATURATION: 98 %

## 2021-06-05 VITALS
SYSTOLIC BLOOD PRESSURE: 151 MMHG | DIASTOLIC BLOOD PRESSURE: 80 MMHG | TEMPERATURE: 98.1 F | HEART RATE: 118 BPM | BODY MASS INDEX: 24.96 KG/M2 | HEIGHT: 67 IN | WEIGHT: 159 LBS

## 2021-06-05 VITALS
TEMPERATURE: 98.1 F | BODY MASS INDEX: 25.13 KG/M2 | SYSTOLIC BLOOD PRESSURE: 122 MMHG | WEIGHT: 151 LBS | DIASTOLIC BLOOD PRESSURE: 82 MMHG | HEART RATE: 82 BPM

## 2021-06-05 VITALS — WEIGHT: 157 LBS | HEIGHT: 67 IN | BODY MASS INDEX: 24.64 KG/M2

## 2021-06-05 VITALS
BODY MASS INDEX: 24.64 KG/M2 | DIASTOLIC BLOOD PRESSURE: 88 MMHG | HEART RATE: 78 BPM | WEIGHT: 155 LBS | SYSTOLIC BLOOD PRESSURE: 130 MMHG

## 2021-06-05 DIAGNOSIS — Z34.80 ENCOUNTER FOR SUPERVISION OF NORMAL PREGNANCY IN MULTIGRAVIDA: ICD-10-CM

## 2021-06-05 DIAGNOSIS — Z36.89 ENCOUNTER FOR FETAL ANATOMIC SURVEY: ICD-10-CM
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
## 2021-06-07 NOTE — TELEPHONE ENCOUNTER
"    Call from pt       CC:  Lower back pain     She is concerned may have a kidney infection - indicated that she has had similar events - \"My urine is starting to smell\"     No blood   No pain with urination    No numbness / tingling anywhere         Is able to stand / walk ok but painful to do so        Pain is a 5/10  \"achy - wont go away\"      A/P:  > OK for [facility] visit today   > Care advice as noted - celia suggested staying hydrated  As able              Leighton Garsia rn      COVID 19 Nurse Triage Plan/Patient Instructions    Please be aware that novel coronavirus (COVID-19) may be circulating in the community. If you develop symptoms such as fever, cough, or SOB or if you have concerns about the presence of another infection including coronavirus (COVID-19), please contact your health care provider or visit [email or web address].     Disposition/Instructions    Patient to have an [facility] Visit with a provider (Preferred option). Follow System Ambulatory Workflow for COVID 19. It is recommended that you setup an [facility] Visit with one of our virtual providers.  To do this follow these instructions:    1. Go to the website [email or web address]  2. Create an account (you will need your insurance information)  3. Start a new visit  4. Choose your diagnosis (e.g. COVID19)  5. Fill out the information about your symptoms  6. A provider will reach out to you by text, phone call or video visit based on your request    Call Back If: Your symptoms worsen before you are able to complete your [facility] Visit with a provider.    Thank you for limiting contact with others, wearing a simple mask to cover your cough, practice good hand hygiene habits and accessing our virtual services where possible to limit the spread of this virus.    For more information about COVID19 and options for caring for yourself at home, please visit the CDC website at https://www.cdc.gov/coronavirus/2019-ncov/about/steps-when-sick.html  For more options for " care at [facility], please visit our website at [email or web address]    For more information, please use the [place] Department of Health COVID-19 Website: [email or web address]  [place] Department of Health ([facility]) COVID-19 Hotlines (Interpreters available):      Health questions: Phone Number: [phone] or [phone] and Hours: 7 a.m. to 7 p.m.    Schools and  questions: Phone Number: [phone] or [phone] and Hours 7 a.m. to 7 p.m.                              Reason for Disposition    Patient wants to be seen    Protocols used: BACK PAIN-A-[place]

## 2021-06-07 NOTE — TELEPHONE ENCOUNTER
"We discussed this dosing at her recent visit when we wrote the prescription- she could try to back off a little bit on the dose, but she has already been taking this dose of Topamax for at least 2 weeks.  If she thinks it's the medication, she could stop the medication for a few days, see if she actually does feel better. Then, she could restart at the initial dose:  1 tablet at bedtime.  Take this for 1 week.  Then, 1 tablet twice a day.  Take this for 1 week.  Then, 1 tablet in morning, 2 tablets at bedtime.  Take this for 1 week.  Then, 2 tablets twice a day.  This would be the \"final\" dose.     If her blood pressure is high, I'm happy to restart the Amlodipine.  I will send a prescription now.    "

## 2021-06-07 NOTE — TELEPHONE ENCOUNTER
Please clarify dosing:    topiramate (TOPAMAX) 25 MG tablet  120 tablet  1  3/9/2020        Si tab po at bedtime;then 1 tab po two times a day;then 1 tab po qam & 2 tab po at bedtime;then 2 tab po two times a day-increase dose weekly      Sent to pharmacy as: topiramate 25 mg tablet (TOPAMAX)      E-Prescribing Status: Receipt confirmed by pharmacy (3/9/2020  4:29 PM CDT)       From writers understanding, instructions for dosing is: First week: take 1 tablet at bed time everyday, Second week: take 1 tablet two times a day, Third week: 1 tablet in the am, and 2 tablets at bedtime, Forth week: take 2 tablets two times a day. Continue to stay on 2 tablets two times a day after the Fourth week. Please clarify.

## 2021-06-07 NOTE — TELEPHONE ENCOUNTER
Called and spoke with Ricarda Echavarria, Dr. Payan's message below was given, Ricarda Echavarria understood, no further questions.

## 2021-06-07 NOTE — TELEPHONE ENCOUNTER
"Triage call:     Cough fever and shortness of breath     States that she has had symptoms for 3 days     If she lays down and is still no shortness of breath   Dizzy and shortness of breath with minimal exertion  Reports that she is having chest pain as well- pressure in her chest- reports she needs to focus on her breathing due to the chest pain - constant     States that her heart is pounding in her chest   States that her fever has been fluctuating - 99-102F  Cough has been infrequent at this time    Patient reports that she always \"hot\" indicates that she has been intermittently sweating as well     Patient is also wondering about her medications- specifically topamax is confusing her- PCP please review instructions-       Disp  Refills  Start  End     topiramate (TOPAMAX) 25 MG tablet  120 tablet  1  3/9/2020      Si tab po at bedtime;then 1 tab po two times a day;then 1 tab po qam & 2 tab po at bedtime;then 2 tab po two times a day-increase dose weekly     Sent to pharmacy as: topiramate 25 mg tablet (TOPAMAX)     E-Prescribing Status: Receipt confirmed by pharmacy (3/9/2020  4:29 PM CDT)       Triaged to be seen in the ER- declines calling 911 - reviewed additional care advice with patient and she verbalizes understanding. [facility] - calling ER staff per protocol     Latasha Browne RN BS [facility] Triage/Med Refill 3/25/2020 12:07 PM    Reason for Disposition    Chest pain lasting longer than 5 minutes and ANY of the following:* Over 50 years old* Over 30 years old and at least one cardiac risk factor (i.e., high blood pressure, diabetes, high cholesterol, obesity, smoker or strong family history of heart disease)* Pain is crushing, pressure-like, or heavy * Took nitroglycerin and chest pain was not relieved* History of heart disease (i.e., angina, heart attack, bypass surgery, angioplasty, CHF)    Protocols used: CHEST PAIN-A-[place]      "
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
## 2021-06-07 NOTE — TELEPHONE ENCOUNTER
"Yes, the original prescription was written to \"ramp up\" to the dose of 50 mg twice a day.  She should stay on that (that is two of the 25 mg tablets twice a day - or when she needs it, we could send in a prescription for one 50 mg tablet twice a day).    "

## 2021-06-07 NOTE — TELEPHONE ENCOUNTER
Please advise - patient last seen on 03/09/2020. Patient has 2 questions:    - Called and spoke with patient. Writer informed patient of the dosage information below. Patient expressed frustration, she says she started immediately with 2 tablets, two times a day due to unclear directions. Patient also stated she has not been feeling well since, unsure if it is because of the medication. Please advise, what should the patient do next? Should she continue to take 2 tabs, twice a day or start with 1 tablet at bed time, and up the dosage each week?     - Patient was seen in ED today, her BP was 151/98, which is still high. Patient says the magnesium not be helping her bp go down naturally. Patient is experiencing chest tightness again, would like to know if  can re-start her on amlodipine.    ISHAN, patient was also seen in ED today for cough, fever, shortness of breath..

## 2021-06-10 NOTE — TELEPHONE ENCOUNTER
Called and spoke with patient. Inform her of message she stated she just needs a letter to stat it is okay for her to return to work 8/25/2020 with no restrictions.  Please send it to [employer].

## 2021-06-10 NOTE — TELEPHONE ENCOUNTER
New Appointment Needed  What is the reason for the visit:    Inpatient/ED Follow Up Appt Request  At what hospital or facility were you seen?: [facility]  What is the reason you were seen?: Stomach infection  What date were you admitted?: date: 8.20.2020  What date were you discharged?: date: 8.20.2020  What was the recommended timeframe for your follow up appointment?: today Monday, 8.24.2020  Provider Preference: Any available  How soon do you need to be seen?: today  Waitlist offered?: No  Okay to leave a detailed message:  Yes

## 2021-06-10 NOTE — TELEPHONE ENCOUNTER
I'm not sure why this was sent to me - I don't make my own appointments.  Can someone help her make an appointment?  I guess we missed the opportunity today - could do it for Tuesday.

## 2021-06-10 NOTE — TELEPHONE ENCOUNTER
Patient called and needed a return letter for work since she was seen at the ER. Already inform patient that since she was not since in the office we may not be able to write the letter. Patient still want a work letter not matter what.     Please advise, thank you.

## 2021-06-10 NOTE — TELEPHONE ENCOUNTER
"Pt is calling in about 4 days of severe intermittent abdominal cramping that goes up to a \"10\" when it comes on about every couple hours, and she feels some shivers when it comes on, and it is worse than labor pains.  Pt reports the pain is in the stomach, then goes into the lower abdomen, and shoots into the sides when she lays down, and sometimes up to the chest. Pt had a hysterectomy, so she knows she is not pregnant. Pt denies any fever, diarrhea, vomiting, but today she just had a BM that was all blood, like mostly all blood dripping and barely any stool. Pt reports the blood is bright red, and this is the first time she noticed any blood.   Care advice given, and per protocol pt should be evaluated in the ER, and should have someone drive her. Pt verbalized understanding and will have someone drive her.     Marshall Renee RN [facility] Triage/Medication Refill    Reason for Disposition    Bloody, black, or tarry bowel movements    SEVERE abdominal pain (e.g., excruciating)    Additional Information    Negative: Passed out (i.e., fainted, collapsed and was not responding)    Negative: Shock suspected (e.g., cold/pale/clammy skin, too weak to stand, low BP, rapid pulse)    Negative: Sounds like a life-threatening emergency to the triager    Negative: Chest pain    Negative: Pain is mainly in upper abdomen (if needed ask: 'is it mainly above the belly button?')    Negative: Abdominal pain and pregnant > 20 weeks    Negative: Abdominal pain and pregnant < 20 weeks    Negative: Vomiting red blood or black (coffee ground) material    Protocols used: ABDOMINAL PAIN - FEMALE-A-OH      "

## 2021-06-10 NOTE — TELEPHONE ENCOUNTER
There is not enough information for me to write a letter.  Does she just need a letter to say she was in the ER?  Does she have specific dates she was out of work?  Has she returned to work?      Could we collect that info?

## 2021-06-10 NOTE — TELEPHONE ENCOUNTER
[facility] #[phone]. Postponing task to tomorrow to try again.
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
## 2021-06-12 NOTE — PROGRESS NOTES
[facility] CLINIC EXAM NOTE      Chief Complaint   Patient presents with     Vaginal     for a 2 days       ASSESSMENT & PLAN    Ricarda was seen today for vaginal.    Diagnoses and all orders for this visit:    Urinary retention:  Urine culture negative.   -     Urinalysis-UC if Indicated  -     Culture, Urine    Vaginal discharge:  Called to discuss negative results. Reviewed that if she feels symptoms are consistent with BV then we can treat as was at the beginning of when symptoms started. She would like to do this. Script for Flagyl sent. Discussed starting on Monday after sisters wedding to avoid with alcohol. States understanding.   -     Wet Prep, Vaginal    H/O total hysterectomy:  No cervix on exam she has had total hysterectomy in 2018. Due to HGSIL and therefore needs f/u vaginal paps/colp. Will refer back to [facility]>   Comments:  due to HGSIL. has not followed up. discussed need for vaginal colp  Orders:  -     Ambulatory referral to Obstetrics / Gynecology        HISTORY    Ricarda Echavarria is a 30 y.o.  female who presents for the following issues:    Bacterial vaginosis. Or yeast, bladder infection? dealing with it her whole life.   Odor.    Not pain. Have to pee then don't.   burnign a little with urination.   No fever chills. +Cramping  No nausea vomiting.     X 2 days. Wanted to deal with it ASAP. Has sisters wedding this weekend.   Hasn't been seen in 3 years.     MEDICATIONS    Current Outpatient Medications on File Prior to Visit   Medication Sig Dispense Refill     acetaminophen (TYLENOL EXTRA STRENGTH) 500 MG tablet Take 1-2 tablets (500-1,000 mg total) by mouth every 6 (six) hours as needed for pain. 30 tablet 0     albuterol (PROAIR HFA) 90 mcg/actuation inhaler Inhale 2 puffs every 6 (six) hours as needed for wheezing. 8.5 g 1     albuterol (PROAIR HFA;PROVENTIL HFA;VENTOLIN HFA) 90 mcg/actuation inhaler Inhale 2 puffs every 4 (four) hours as needed for wheezing. 1 Inhaler 0      amitriptyline (ELAVIL) 10 MG tablet Take 1-2 tablets (10-20 mg total) by mouth at bedtime. 60 tablet 1     amLODIPine (NORVASC) 10 MG tablet Take 1 tablet (10 mg total) by mouth daily. 30 tablet 11     benzonatate (TESSALON) 100 MG capsule Take 1 capsule (100 mg total) by mouth every 8 (eight) hours. 21 capsule 0     ergocalciferol (ERGOCALCIFEROL) 1,250 mcg (50,000 unit) capsule Take 1 capsule (50,000 Units total) by mouth once a week. 4 capsule 5     magnesium oxide 400 mg magnesium Tab Take 400 mg by mouth daily. 30 tablet 5     metoclopramide (REGLAN) 10 MG tablet Take 1 tablet (10 mg total) by mouth every 6 (six) hours. 30 tablet 0     oxyCODONE-acetaminophen (PERCOCET/ENDOCET) 5-325 mg per tablet Take 1 tablet by mouth every 4 (four) hours as needed for pain. 8 tablet 0     PARoxetine (PAXIL) 10 MG tablet TAKE 1 TABLET(10 MG) BY MOUTH EVERY MORNING 30 tablet 2     riboflavin, vitamin B2, 400 mg Tab Take 400 mg by mouth daily. 30 tablet 5     topiramate (TOPAMAX) 25 MG tablet 1 tab po at bedtime;then 1 tab po two times a day;then 1 tab po qam & 2 tab po at bedtime;then 2 tab po two times a day-increase dose weekly 120 tablet 1     No current facility-administered medications on file prior to visit.        Pertinent past medical, surgical, social and family history reviewed and updated in Epic.    Social History     Socioeconomic History     Marital status:      Spouse name: Not on file     Number of children: Not on file     Years of education: Not on file     Highest education level: Not on file   Occupational History     Occupation: not currently employed   Social Needs     Financial resource strain: Not on file     Food insecurity     Worry: Not on file     Inability: Not on file     Transportation needs     Medical: Not on file     Non-medical: Not on file   Tobacco Use     Smoking status: Light Tobacco Smoker     Packs/day: 0.50     Years: 7.00     Pack years: 3.50     Last attempt to quit: 5/1/2018      Years since quittin.4     Smokeless tobacco: Never Used   Substance and Sexual Activity     Alcohol use: No     Drug use: No     Sexual activity: Yes     Partners: Male     Birth control/protection: None   Lifestyle     Physical activity     Days per week: Not on file     Minutes per session: Not on file     Stress: Not on file   Relationships     Social connections     Talks on phone: Not on file     Gets together: Not on file     Attends Taoism service: Not on file     Active member of club or organization: Not on file     Attends meetings of clubs or organizations: Not on file     Relationship status: Not on file     Intimate partner violence     Fear of current or ex partner: Not on file     Emotionally abused: Not on file     Physically abused: Not on file     Forced sexual activity: Not on file   Other Topics Concern     Not on file   Social History Narrative    Lives with , 3 children              REVIEW OF SYSTEMS    ROS: 10 pt review of systems performed and all negative except noted in HPI.     PHYSICAL EXAM    /82 (Patient Site: Left Arm, Patient Position: Sitting, Cuff Size: Adult Regular)   Pulse 82   Temp 98.1  F (36.7  C) (Tympanic)   Wt 151 lb (68.5 kg)   LMP 2017 (Approximate)   BMI 25.13 kg/m    GEN:  30 y.o. female sitting comfortably in no apparent distress.   CHEST/LUNG: No respiratory distress  SKIN: warm, dry, no rashes or lesions  NEURO: Gait normal, coordination intact  PSYCH:  Mood and affect appropriate  Pelvic exam: VULVA: normal appearing vulva with no masses, tenderness or lesions, VAGINA: vaginal discharge - white and creamy. NO cervix      At the end of the encounter, I discussed results, diagnosis, medications. Discussed red flags for immediate return to clinic/ER, as well as indications for follow up if no improvement. Patient and/or caregiver understood and agreed to plan. Patient was stable for discharge.      Ruth Jhaveri

## 2021-06-13 NOTE — PROGRESS NOTES
Subjective:      Ricarda Echavarria is a 27 y.o. female who presents for evaluation of vaginal discharge.  She has had significant vaginal discharge for 3 days.  It also has a bad smell.  She has had bacterial vaginosis before, and thinks that might be what it is.  She has not tried any over-the-counter medications yet.  She is about due for her Pap smear.  History of a normal Pap in 2015.  Pap in 2014 showed HSIL, severe dysplasia, CIS, CLAYTON-2, CLAYTON-3, high risk HPV.  She also has a history of a LEEP procedure.  She has a Mirena IUD.  She has irregular periods.  She has noticed a little more pelvic cramping recently.  She has never been able to palpate her IUD strings.    Patient Active Problem List   Diagnosis     Asthma     Papanicolaou smear of cervix with high grade squamous intraepithelial lesion (HGSIL)     Normal pregnancy, unspecified trimester     History of pre-eclampsia in prior pregnancy, currently pregnant     History of thrombocytopenia     Migraine with aura and without status migrainosus, not intractable     Continuous nicotine dependence     History of rapid second stage of labor        Current Outpatient Prescriptions:      acetaminophen (TYLENOL) 325 MG tablet, Take 2 tablets (650 mg total) by mouth every 6 (six) hours as needed for pain., Disp: 100 tablet, Rfl: 2     albuterol (PROAIR HFA) 90 mcg/actuation inhaler, Inhale 2 puffs every 6 (six) hours as needed for wheezing., Disp: 8.5 g, Rfl: 1    Objective:     Allergies:  Oxycodone terephthalate    Vitals:    11/02/17 0847   BP: 120/60   Pulse: 80   Resp: 20     Body mass index is 23.3 kg/(m^2).    General: Alert and oriented x 3, in no apparent distress  Genitourinary: External genitalia is normal in appearance, vaginal walls are healthy, cervix is well visualized and normal in appearance, consistent with history of LEEP procedure, no significant discharge noted, IUD strings not visualized, IUD strings were not palpable on bimanual exam    Results  for orders placed or performed in visit on 11/02/17   Wet Prep, Vaginal   Result Value Ref Range    Yeast Result No yeast seen No yeast seen    Trichomonas No Trichomonas seen No Trichomonas seen    Clue Cells, Wet Prep No Clue cells seen No Clue cells seen   Other labs pending.    Assessment and Plan:     1.  Vaginal discharge.  Initial wet prep was normal.  It is possible she could have bacterial vaginosis.  Consider treating for that while following up with other pending results.    2.  Screening Pap smear.  History of normal Pap smear in 2015, abnormal Pap in 2014.  Past history of a LEEP.  I will follow-up with results.    3.  IUD check.  I was unable to visualize IUD strings or palpate them.  Patient did not want to stay for an x-ray today.  Plan on scheduling an ultrasound for an IUD check.    This dictation uses voice recognition software, which may contain typographical errors.
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
## 2021-06-14 NOTE — PATIENT INSTRUCTIONS - HE
1. We will try a medication called PROPRANOLOL, which may help with your symptoms. If you feel more tired or dizzy with this medication, let us know and we will likely discontinue it.  2. We will also schedule an ultrasound of your heart (echocardiogram).

## 2021-06-14 NOTE — TELEPHONE ENCOUNTER

## 2021-06-14 NOTE — TELEPHONE ENCOUNTER
Reason for Call:  Medication or medication refill:paroxetine    Do you use a [facility]?  Name of the pharmacy and phone number for the current request: [facility] said they have called us several times and no answer?    Name of the medication requested: paroxetine pt is out of her rx    Other request: asap    Can we leave a detailed message on this number? No call back needed    Phone number patient can be reached at: Home number on file [phone] (home)    Best Time: asap    Call taken on 1/18/2021 at 11:00 AM by Blanca Brooks

## 2021-06-14 NOTE — PROGRESS NOTES
Ricarda Echavarria is a 30 y.o. female who is being evaluated via a billable video visit.      How would you like to obtain your AVS? Mail a copy.  If dropped from the video visit, the video invitation should be resent by: Text to cell phone: [phone]   Will anyone else be joining your video visit? No      Video Start Time: 11:01  Assessment & Plan     Ricarda was seen today for body aches-headache-sore throat- no fever.    Diagnoses and all orders for this visit:    Pharyngitis, unspecified etiology  -     Rapid Strep A Screen-Throat; Future  -     amoxicillin-clavulanate (AUGMENTIN) 875-125 mg per tablet; Take 1 tablet by mouth 2 (two) times a day for 10 days.    Upper respiratory tract infection, unspecified type  -     Symptomatic COVID-19 Virus (CORONAVIRUS) PCR; Future  -     amoxicillin-clavulanate (AUGMENTIN) 875-125 mg per tablet; Take 1 tablet by mouth 2 (two) times a day for 10 days.  -     predniSONE (DELTASONE) 20 MG tablet; 2 po daily for 5 days.    Mild intermittent asthma without complication  -     predniSONE (DELTASONE) 20 MG tablet; 2 po daily for 5 days.      Patient has pharyngitis with exposure to strep we will cover her with Augmentin, as well as prednisone she does have her inhaler.    Check strep test    Symptoms concerning for COVID-19 as well check COVID-19.  Discussed self quarantining I did transcribe a letter for her    Independent interpretation of a test performed by another physician/other qualified health care professional (not separately reported) -patient had a physical with , from 1/19/2021 CMP normal TSH normal CBC normal lipid showed HDL 46 LDL 98, discussed increasing physical activity.    Patient be contacted with results.    She feels like her asthma is controlled does not need any    23 minutes spent on the date of the encounter doing chart review, history and exam, documentation and further activities as noted above    [id number]}     Tobacco Cessation:   reports that she  "has been smoking. She has a 3.50 pack-year smoking history. She has never used smokeless tobacco.    BMI:   Estimated body mass index is 25.28 kg/m  as calculated from the following:    Height as of 1/19/21: 5' 6.5\" (1.689 m).    Weight as of 1/19/21: 159 lb (72.1 kg).         No follow-ups on file.    Leander Winston MD  [facility]    Subjective     Ricarda Echavarria is 30 y.o. and presents to clinic today for the following health issues   HPI   This patient had a virtual visit, video, due to the coronavirus pandemic.    This patient has had symptoms of body ache headache sore throat.  Has had no fever    Cough is productive of some greenish phlegm.    She has some congestion in the head.    Slight decreased smell.    Symptoms been going on for couple days    She was exposed to strep pharyngitis and his sister 3 days ago.    Her  is also been sick for about the same amount of time.    She has not had COVID-19 exposure that she knows of    She is working presently    After discussion I felt she should leave work.  We will treat her with Augmentin, check for strep and COVID-19.    She has 4 kids at home the 2 little ones have had some cough and congestion but have normal activity.    Patient is more tired and has had the body aches.    No vomiting.    No rash.        Review of Systems  10 point review of systems positive as discussed above otherwise negative      Objective       Vitals:  No vitals were obtained today due to virtual visit.    Physical Exam  General appearance no acute distress although looks tired.    HEENT sore throat complains of some swelling glands    Neck was supple    Mild congestion    Slight cough but did not have this wall the exam took place.    She does have an inhaler she has a history of asthma.  She has not used it too much.  But does feel some tightness in the chest.    Heart: No palpitations or rapid heart rate    Abdomen nontender    Extremities without calf " swelling redness or warmth.    We will get a strep test and a COVID-19 nasal swab.            Video-Visit Details    Type of service:  Video Visit    Video End Time (time video stopped): 11:17 AM  Originating Location (pt. Location): Home    Distant Location (provider location):  [facility]     Platform used for Video Visit: Ray

## 2021-06-15 ENCOUNTER — AMBULATORY - HEALTHEAST (OUTPATIENT)
Dept: NURSING | Facility: CLINIC | Age: 31
End: 2021-06-15

## 2021-06-15 NOTE — ANESTHESIA POSTPROCEDURE EVALUATION
Patient: Ricarda Echavarria  TOTAL LAPAROSCOPIC HYSTERECTOMY, BILATERAL SALPINGECTOMY WITH CYSTOSCOPY - general with top block  Anesthesia type: general    Patient location: PACU  Last vitals:   Vitals:    01/18/18 0934   BP: 110/70   Pulse: (!) 104   Resp: 12   Temp: 36.9  C (98.5  F)   SpO2: 100%     Post vital signs: stable  Level of consciousness: awake and responds to simple questions  Post-anesthesia pain: pain controlled  Post-anesthesia nausea and vomiting: no  Pulmonary: unassisted, return to baseline  Cardiovascular: stable and blood pressure at baseline  Hydration: adequate  Anesthetic events: no    QCDR Measures:  ASA# 11 - Debra-op Cardiac Arrest: ASA11B - Patient did NOT experience unanticipated cardiac arrest  ASA# 12 - Debra-op Mortality Rate: ASA12B - Patient did NOT die  ASA# 13 - PACU Re-Intubation Rate: ASA13B - Patient did NOT require a new airway mgmt  ASA# 10 - Composite Anes Safety: ASA10A - No serious adverse event    Additional Notes:    B/L TAP blocks done in OR

## 2021-06-15 NOTE — ANESTHESIA PROCEDURE NOTES
Peripheral Block    Patient location during procedure: post-op  Start time: 1/18/2018 9:29 AM  End time: 1/18/2018 9:31 AM  post-op analgesia per surgeon order as noted in medical record  Staffing:  Performing  Anesthesiologist: LISBET TREADWELL  Preanesthetic Checklist  Completed: patient identified, site marked, risks, benefits, and alternatives discussed, timeout performed, consent obtained, airway assessed, oxygen available, suction available, emergency drugs available and hand hygiene performed  Peripheral Block  Block type: other, TAP  Prep: ChloraPrep  Patient position: supine  Patient monitoring: cardiac monitor, continuous pulse oximetry, heart rate and blood pressure  Laterality: left  Injection technique: ultrasound guided    Ultrasound used to visualize needle placement in proximity to nerve being blocked: yes   Permanent ultrasound image captured for medical record    Needle  Needle type: echogenic   Needle gauge: 20G  Needle length: 4 in  no peripheral nerve catheter placed  Assessment  Injection assessment: no difficulty with injection, negative aspiration for heme, no paresthesia on injection and incremental injection  Additional Notes      Confirmed no local anesthetic given by surgery team. Confirmed request for TAP block by surgery team. Left transversus abdominis plane (TAP) block with bupivacaine 0.25% 20 cc. Patient tolerated procedure well.    Lisbet Treadwell MD  Staff Anesthesiologist  [facility]

## 2021-06-15 NOTE — ANESTHESIA PROCEDURE NOTES
Peripheral Block    Patient location during procedure: post-op  Start time: 1/18/2018 9:27 AM  End time: 1/18/2018 9:29 AM  post-op analgesia per surgeon order as noted in medical record  Staffing:  Performing  Anesthesiologist: LISBET TREADWELL  Preanesthetic Checklist  Completed: patient identified, site marked, risks, benefits, and alternatives discussed, timeout performed, consent obtained, airway assessed, oxygen available, suction available, emergency drugs available and hand hygiene performed  Peripheral Block  Block type: other, TAP  Prep: ChloraPrep  Patient position: supine  Patient monitoring: cardiac monitor, continuous pulse oximetry, heart rate and blood pressure  Laterality: right  Injection technique: ultrasound guided    Ultrasound used to visualize needle placement in proximity to nerve being blocked: yes   Permanent ultrasound image captured for medical record    Needle  Needle type: echogenic   Needle gauge: 20G  Needle length: 4 in  no peripheral nerve catheter placed  Assessment  Injection assessment: no difficulty with injection, negative aspiration for heme, no paresthesia on injection and incremental injection  Additional Notes    Confirmed no local anesthetic given by surgery team. Confirmed request for TAP block by surgery team. Right transversus abdominis plane (TAP) block with bupivacaine 0.25% 20 cc. Patient tolerated procedure well.    Lisbet Treadwell MD  Staff Anesthesiologist  [facility]

## 2021-06-15 NOTE — ANESTHESIA PREPROCEDURE EVALUATION
Anesthesia Evaluation      Patient summary reviewed     Airway    Pulmonary                           Cardiovascular    Neuro/Psych - negative ROS     Endo/Other - negative ROS      GI/Hepatic/Renal    (+) GERD well controlled,             Dental                         Anesthesia Plan

## 2021-06-15 NOTE — ANESTHESIA PREPROCEDURE EVALUATION
Anesthesia Evaluation      Patient summary reviewed   History of anesthetic complications: Motion sickness on rides.     Airway   Mallampati: II  Neck ROM: full   Pulmonary     breath sounds clear to auscultation  (+) asthma  a smoker  (-) wheezes                         Cardiovascular   Exercise tolerance: > or = 4 METS  Dysrhythmias: Hx of Holter workup - negative. Palpitations likely anxiety induced.  Rhythm: regular        Neuro/Psych    (+) anxiety/panic attacks,     Endo/Other       GI/Hepatic/Renal    (+) GERD,        Other findings:     NPO 6 PM                                Asthma     Papanicolaou smear of cervix with high grade squamous intraepithelial lesion (HGSIL)    History of pre-eclampsia in prior pregnancy, currently pregnant    History of thrombocytopenia    Migraine with aura and without status migrainosus, not intractable    Continuous nicotine dependence    History of rapid second stage of labor    Mirena IUD           Results for GHADA SILVESTRE (MRN [id number]) as of 2018 15:03  Pregnancy Test, Urine: Negative      Results for GHADA SILVESTRE (MRN [id number]) as of 2018 14:58  Hemoglobin: 12.8  ABORh: O POS  Antibody Screen: Negative    Results for GAHDA SILVESTRE (MRN [id number]) as of 2018 11:56    2018  WBC: 5.8  RBC: 4.12  Hemoglobin: 12.8  Hematocrit: 36.2  MCV: 88  MCH: 31.1  MCHC: 35.4  RDW: 11.7  Platelets: 247  MPV: 11.2              Dental - normal exam     Comment: Top overbite                       Anesthesia Plan  Planned anesthetic: general endotracheal      Propofol gtt  Zofran decadron 10 mg, scopolamine patch  Esmolol  TAP blocks for post-op pain as requested by surgeon        ASA 2   Induction: intravenous   Anesthetic plan and risks discussed with: patient and spouse  Anesthesia plan special considerations: antiemetics,   Post-op plan: routine recovery          Lisbet Treadwell MD  Staff Anesthesiologist  [facility]  1/18/18

## 2021-06-15 NOTE — ANESTHESIA CARE TRANSFER NOTE
Last vitals:   Vitals:    01/18/18 0934   BP: 110/70   Pulse: (!) 104   Resp: 12   Temp: 36.9  C (98.5  F)   SpO2: 100%     Patient's level of consciousness is drowsy  Spontaneous respirations: yes  Maintains airway independently: yes  Dentition unchanged: yes  Oropharynx: oropharynx clear of all foreign objects    QCDR Measures:  ASA# 20 - Surgical Safety Checklist: WHO surgical safety checklist completed prior to induction  PQRS# 430 - Adult PONV Prevention: 4558F - Pt received => 2 anti-emetic agents (different classes) preop & intraop  ASA# 8 - Peds PONV Prevention: NA - Not pediatric patient, not GA or 2 or more risk factors NOT present  PQRS# 424 - Debra-op Temp Management: 4559F - At least one body temp DOCUMENTED => 35.5C or 95.9F within required timeframe  PQRS# 426 - PACU Transfer Protocol: - Transfer of care checklist used  ASA# 14 - Acute Post-op Pain: ASA14B - Patient did NOT experience pain >= 7 out of 10

## 2021-06-16 ENCOUNTER — COMMUNICATION - HEALTHEAST (OUTPATIENT)
Dept: FAMILY MEDICINE | Facility: CLINIC | Age: 31
End: 2021-06-16

## 2021-06-16 DIAGNOSIS — B96.89 BACTERIAL VAGINITIS: ICD-10-CM

## 2021-06-16 DIAGNOSIS — N76.0 BACTERIAL VAGINITIS: ICD-10-CM

## 2021-06-16 PROBLEM — F32.2 SEVERE MAJOR DEPRESSION (H): Status: ACTIVE | Noted: 2021-01-19

## 2021-06-16 PROBLEM — D06.9 CIN III (CERVICAL INTRAEPITHELIAL NEOPLASIA III): Status: ACTIVE | Noted: 2021-04-01

## 2021-06-16 NOTE — TELEPHONE ENCOUNTER
0 Patient had Telphone visit with Dr. Mirza today. Per Dr. Mirza  Please schedule the following:Return in about 1 month (around 5/1/2021) for Pap smear..      Check-out Note: AVS  Please let Ricarda know that most of the people in her family are due for physical/well child checks.   Print letter for patient, her , and her brother (names below) for work.   Please schedule the following patients for Covid tests today and again on 4/7/21:   Ricarad Echavarria [birth year]   Clarence Johnson [birth year]   Klarissa Johnson [birth year]09   Lily Echavarria [birth year]   Manasa Echavarria [birth year]   Corby Almonte [birth year]

## 2021-06-16 NOTE — PROGRESS NOTES
Assessment/ Plan  1. Urinary tract infection without hematuria, site unspecified  Presumed, patient convinced, despite negative urine.  Read empirically and culture urine.  - Urinalysis-UC if Indicated  - sulfamethoxazole-trimethoprim (SEPTRA DS) 800-160 mg per tablet; Take 1 tablet by mouth 2 (two) times a day.  Dispense: 6 tablet; Refill: 0    2. Bacterial vaginitis  Patient has recurrent BV and reports symptoms.  Will treat.  Warned against interaction with alcohol.  She does not drink  - traZODone (DESYREL) 50 MG tablet; Take 1 tablet (50 mg total) by mouth at bedtime.  Dispense: 30 tablet; Refill: 1    3. Insomnia, unspecified type  Trazodone treat anxiety  4. Anxiety  Recommend increasing paroxetine to 20 mg.  She is resistant to this, may go up to 15  Continue to work with therapist  Body mass index is 24.64 kg/m .    Subjective  CC:  No chief complaint on file.    HPI:      Patient is sure that for the last week or so she has had bacterial vaginosis.  Has an odor and has vaginal discharge.  Indicates that she is at no risk for STDs.    Also quite certain she has a UTI  That these things come together for her.  Dysuria    ------------------------------------  Duration/ onset: 1week(s)    Severity/ quality of discomfort: moderate  Associated symptoms:   Urinary frequency?  Yes  Urethral discharge?  No  Fever/chills/ sweats?  No  Back pain?  No  No std risk      Indicates that she is having a great deal of difficulty sleeping at night.  Not sleepy during the day  Has a child, really needs to get sleep    Anxious.  Has had a lot happen in her life in the last 6 months.  Apparently lost a sister and another friend to drug overdoses.  Has been waking up in the middle the night anxious, panic  Has had this in the past, then it got better, now is worse    Has been on paroxetine for quite some time.  Was on a higher dose previously and then try to cut back on this.  She really wants to get off the  medication.      Patient Active Problem List   Diagnosis     Mild intermittent asthma without complication     History of thrombocytopenia     Migraine with aura and without status migrainosus, not intractable     Continuous nicotine dependence     Severe major depression (H)     CLAYTON III (cervical intraepithelial neoplasia III)     Current medications reviewed as follows:  Current Outpatient Medications on File Prior to Visit   Medication Sig     acetaminophen (TYLENOL EXTRA STRENGTH) 500 MG tablet Take 1-2 tablets (500-1,000 mg total) by mouth every 6 (six) hours as needed for pain.     amitriptyline (ELAVIL) 10 MG tablet Take 1-2 tablets (10-20 mg total) by mouth at bedtime.     amLODIPine (NORVASC) 10 MG tablet Take 1 tablet (10 mg total) by mouth daily.     benzonatate (TESSALON) 100 MG capsule Take 1 capsule (100 mg total) by mouth every 8 (eight) hours.     ergocalciferol (ERGOCALCIFEROL) 1,250 mcg (50,000 unit) capsule Take 1 capsule (50,000 Units total) by mouth once a week.     ibuprofen (ADVIL,MOTRIN) 200 MG tablet Take 200 mg by mouth every 6 (six) hours as needed for pain.     magnesium oxide 400 mg magnesium Tab Take 400 mg by mouth daily.     metoclopramide (REGLAN) 10 MG tablet Take 1 tablet (10 mg total) by mouth every 6 (six) hours.     omeprazole (PRILOSEC) 20 MG capsule Take 1 capsule (20 mg total) by mouth daily before breakfast.     oxyCODONE-acetaminophen (PERCOCET/ENDOCET) 5-325 mg per tablet Take 1 tablet by mouth every 4 (four) hours as needed for pain.     PARoxetine (PAXIL) 10 MG tablet TAKE 1 TABLET(10 MG) BY MOUTH EVERY MORNING     predniSONE (DELTASONE) 20 MG tablet 2 po daily for 5 days.     propranoloL (INDERAL) 20 MG tablet Take 1 tablet (20 mg total) by mouth 2 (two) times a day.     riboflavin, vitamin B2, 400 mg Tab Take 400 mg by mouth daily.     topiramate (TOPAMAX) 25 MG tablet 1 tab po at bedtime;then 1 tab po two times a day;then 1 tab po qam & 2 tab po at bedtime;then 2 tab  po two times a day-increase dose weekly     No current facility-administered medications on file prior to visit.      Social History     Tobacco Use   Smoking Status Current Every Day Smoker     Packs/day: 0.25     Years: 7.00     Pack years: 1.75     Types: Cigarettes   Smokeless Tobacco Never Used     Social History     Social History Narrative    Lives with , 3 children          Patient Care Team:  Nori Lowry MD as PCP - General (Family Medicine)  Nori Lowry MD as Assigned PCP  Ray Browne MD as Assigned Heart and Vascular Provider  ROS  As above      Objective  Physical Exam  Vitals:    04/22/21 1628   BP: 130/88   Patient Site: Right Arm   Patient Position: Sitting   Cuff Size: Adult Regular   Pulse: 78   Weight: 155 lb (70.3 kg)     Alert, oriented, anxious, engaged.  Normal thought and speech content.  No flank tenderness, no abdominal tenderness  Diagnostics  Results for orders placed or performed in visit on 04/22/21   Urinalysis-UC if Indicated   Result Value Ref Range    Color, UA Yellow Colorless, Yellow, Straw, Light Yellow    Clarity, UA Clear Clear    Glucose, UA Negative Negative    Protein, UA Negative Negative    Bilirubin, UA Negative Negative    Urobilinogen, UA 0.2 E.U./dL 0.2 E.U./dL, 1.0 E.U./dL    pH, UA 5.5 5.0 - 8.0    Blood, UA Negative Negative    Ketones, UA Negative Negative    Nitrite, UA Negative Negative    Leukocytes, UA Negative Negative    Specific Gravity, UA 1.010 1.005 - 1.030         Please note: Voice recognition software was used in this dictation.  It may therefore contain typographical errors.

## 2021-06-16 NOTE — TELEPHONE ENCOUNTER
Reason for Call:  Other      Detailed comments: pt is calling to say she has had a covid test and it was negative, she needs a note that she is ok to return to work.    Phone Number Patient can be reached at: Home number on file [phone] (home)    Best Time: asap    Can we leave a detailed message on this number?: Yes    Call taken on 4/7/2021 at 8:50 AM by Blanca Brooks

## 2021-06-16 NOTE — TELEPHONE ENCOUNTER
Called - needs a note to get back to work  Had to have 2 tests done:  1st was negative  2nd was done today - she is certain it will be negative    Daughter went with her aunt to hang out (2 weeks ago - birthday night on a weekend).  Brought her back -   Riacrda found out that aunt and 3 people tested positive (they tested positive the following Monday);      Ricarda and family were tested negative; , Ricarda and kids as well as Ricarda's brother - all negative - they were tested last week Tuesday or Wednesday .  2nd test was today - was at [facility] -.      Works at [employer] -   Patient has had no symptoms  No fever, no cough, no loss of taste or smell

## 2021-06-16 NOTE — TELEPHONE ENCOUNTER
Coronavirus (COVID-19) Notification     Reason for call  Patient requesting results     Lab Result    Lab test 2019-nCoV rRt-PCR in process        RN Recommendations/Instructions per [facility]  Continue quarantee and following instructions until you receive the results     Please Contact your PCP clinic or return to the Emergency department if your:    Symptoms worsen or other concerning symptom's.     Patient informed that if test for COVID19 is POSITIVE,  you will receive a call typically within 48 hours from the test date (date lab collected).  If NEGATIVE result, you will receive a letter in the mail or MyChart.      Maren Davis RN

## 2021-06-16 NOTE — PROGRESS NOTES
Ricarda Echavarria is a 31 y.o. female who is being evaluated via a billable telephone visit.      What phone number would you like to be contacted at? [phone]   How would you like to obtain your AVS? AVS Preference: Mail a copy.    ____________________________    Virtual Visit - Telephone Encounter  [facility]  Family Medicine  Date of Service: 4/1/2021    Subjective:    Covid exposure  Mother in law said everyone in her daughter's household tested positive for Covid    Exposures:  This weekend - kids' grandma (mother) came over on Saturday - She is covid positive  Daughter spend the weekend MIL  Yesterday - mother in law    Test now - exposure  Test Monday - yesterday exposure    Quarantine 14 days.    Household: Ricarda, , 4 kids (14, 12, 6, 5), brother.     Barry Echavarria [birth year]  Clarence Minor [birth year]  Klarissa Minor [birth year]/09  Lily Echavarria [birth year]15  Manasa Echavarria [birth year]16  Corby Almonte [birth year]    Objective:  Last menstrual period 11/18/2017, not currently breastfeeding.   Speech is normal  Patient is calm  No visits with results within 1 Week(s) from this visit.   Latest known visit with results is:   Hospital Outpatient Visit on 02/22/2021   Component Date Value Ref Range Status     LV volume diastolic 02/22/2021 71.5  46.0 - 106.0 cm3 Final     LV volume systolic 02/22/2021 32.4  14.0 - 42.0 cm3 Final     HR 02/22/2021 70  bpm Final     IVSd 02/22/2021 0.992* 0.6 - 0.9 cm Final     LVIDd 02/22/2021 3.82  3.8 - 5.2 cm Final     LVIDs 02/22/2021 1.91* 2.2 - 3.5 cm Final     LVOT diam 02/22/2021 1.9  cm Final     LVOT mean gradient 02/22/2021 4  mmHg Final     LVOT peak VTI 02/22/2021 27.4  cm Final     LVOT mean jostin 02/22/2021 92.4  cm/s Final     LVOT peak jostin 02/22/2021 135  cm/s Final     LVOT peak gradient 02/22/2021 7  mmHg Final     LV PWd 02/22/2021 1.11* 0.6 - 0.9 cm Final     MV E' lat jostin 02/22/2021 17.5  cm/s Final     MV E' med jostin 02/22/2021 11.8  cm/s Final     AV mean jostin  02/22/2021 90.1  cm/s Final     AV mean gradient 02/22/2021 4  mmHg Final     AV VTI 02/22/2021 26.8  cm Final     AV peak jostin 02/22/2021 132  cm/s Final     AO root 02/22/2021 2.7  cm Final     AO ascending 02/22/2021 2.6  cm Final     LA size 02/22/2021 3.2  cm Final     LA length 02/22/2021 5.1  cm Final     LA/AO root ratio 02/22/2021 1.19  no units Final     MV decel slope 02/22/2021 4,820  mm/s2 Final     MV decel time 02/22/2021 204  ms Final     MV P 1/2 time 02/22/2021 60  ms Final     MV peak A jostin 02/22/2021 65.6  cm/s Final     MV peak E jostin 02/22/2021 98.5  cm/s Final     LA area 2 02/22/2021 17.5  cm2 Final     LA area 1 02/22/2021 17  cm2 Final     BSA 02/22/2021 1.83  m2 Final     Hieght 02/22/2021 66.5  in Final     Weight 02/22/2021 2,512  lbs Final     BP 02/22/2021 139/80  mmHg Final     IVS/PW ratio 02/22/2021 0.9   Final     LV FS 02/22/2021 50.0  28.0 - 44.0 % Final     Echo LVEF calculated 02/22/2021 55  55 - 75 % Final     LA volume 02/22/2021 49.6  mL Final     LV mass 02/22/2021 127.0  g Final     AV area 02/22/2021 2.9  cm2 Final     AV DIM IND jostin 02/22/2021 1.0   Final     MV area p 1/2 time 02/22/2021 3.7  cm2 Final     MV E/A Ratio 02/22/2021 1.5   Final     LVOT area 02/22/2021 2.83  cm2 Final     LVOT SV 02/22/2021 77.6  cm3 Final     AV peak gradient 02/22/2021 7.0  mmHg Final     LV systolic volume index 02/22/2021 17.7  8.0 - 24.0 cm3/m2 Final     LV diastolic volume index 02/22/2021 39.1  29.0 - 61.0 cm3/m2 Final     LA volume index 02/22/2021 27.1  mL/m2 Final     LV mass index 02/22/2021 69.4  g/m2 Final     LV SVi 02/22/2021 42.4  ml/m2 Final     TAPSE 02/22/2021 2.7  cm Final     MV med E/e' ratio 02/22/2021 8.3   Final     MV lat E/e' ratio 02/22/2021 5.6   Final     LV CO 02/22/2021 5.4  l/min Final     LV Ci 02/22/2021 3.0  l/min/m2 Final     Height 02/22/2021 66.5  in Final     Weight 02/22/2021 157  lbs Final     MV Avg E/e' Ratio 02/22/2021 6.7  cm/s Final     AV DIM  IND VTI 02/22/2021 1.0   Final     No results found.   Assessment & Plan:  1. Covid exposure - entire household. Check Covid PCR now. Repeat next week. Discussed 14 day quarantine.       Order Summary                                                      No diagnosis found.   Future Appointments   Date Time Provider Department Center   4/1/2021 11:00 AM Kadie Mirza MD [facility]       Completed by: Kadie Mirza M.D., [facility]. 4/1/2021 10:54 AM.  This transcription uses voice recognition software, which may contain typographical errors.  ____________________________  Start call: 11:02 AM  End call: 11:34 AM     Phone call duration: 32 minutes
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
## 2021-06-20 NOTE — LETTER
Letter by Nori Lowry MD at      Author: Nori Lowry MD Service: -- Author Type: --    Filed:  Encounter Date: 2/4/2020 Status: (Other)         Ricarda Echavarria  [address] [place] [address]             February 4, 2020         Dear Ms. Echavarria,    Below are the results from your recent visit:    Resulted Orders   Follicle Stimulating Hormone (FSH)   Result Value Ref Range    FSH 9.2 mIU/mL      Comment:      Females:     Prepubertal     0-10 mIU/mL    Follicular      3-20 mIU/mL    Luteal          0-12 mIU/mL    Ovulatory       9-26 mIU/mL    Postmenopausal   mIU/mL   Thyroid Stimulating Hormone (TSH)   Result Value Ref Range    TSH 0.40 0.30 - 5.00 uIU/mL   Comprehensive Metabolic Panel   Result Value Ref Range    Sodium 139 136 - 145 mmol/L    Potassium 3.4 (L) 3.5 - 5.0 mmol/L    Chloride 104 98 - 107 mmol/L    CO2 24 22 - 31 mmol/L    Anion Gap, Calculation 11 5 - 18 mmol/L    Glucose 92 70 - 125 mg/dL    BUN 17 8 - 22 mg/dL    Creatinine 0.72 0.60 - 1.10 mg/dL    GFR MDRD Af Amer >60 >60 mL/min/1.73m2    GFR MDRD Non Af Amer >60 >60 mL/min/1.73m2    Bilirubin, Total 0.4 0.0 - 1.0 mg/dL    Calcium 9.1 8.5 - 10.5 mg/dL    Protein, Total 7.4 6.0 - 8.0 g/dL    Albumin 3.8 3.5 - 5.0 g/dL    Alkaline Phosphatase 96 45 - 120 U/L    AST 17 0 - 40 U/L    ALT 24 0 - 45 U/L    Narrative    Fasting Glucose reference range is 70-99 mg/dL per  American Diabetes Association (ADA) guidelines.   Vitamin D, Total (25-Hydroxy)   Result Value Ref Range    Vitamin D, Total (25-Hydroxy) 12.7 (L) 30.0 - 80.0 ng/mL    Narrative    Deficiency <10.0 ng/mL  Insufficiency 10.0-29.9 ng/mL  Sufficiency 30.0-80.0 ng/mL  Toxicity (possible) >100.0 ng/mL   Vitamin B12   Result Value Ref Range    Vitamin B-12 663 213 - 816 pg/mL   HM1 (CBC with Diff)   Result Value Ref Range    WBC 6.7 4.0 - 11.0 thou/uL    RBC 4.10 3.80 - 5.40 mill/uL    Hemoglobin 11.9 (L) 12.0 - 16.0 g/dL    Hematocrit 35.5 35.0  - 47.0 %    MCV 86 80 - 100 fL    MCH 28.9 27.0 - 34.0 pg    MCHC 33.4 32.0 - 36.0 g/dL    RDW 11.4 11.0 - 14.5 %    Platelets 250 140 - 440 thou/uL    MPV 9.3 7.0 - 10.0 fL    Neutrophils % 50 50 - 70 %    Lymphocytes % 37 20 - 40 %    Monocytes % 9 2 - 10 %    Eosinophils % 4 0 - 6 %    Basophils % 1 0 - 2 %    Neutrophils Absolute 3.4 2.0 - 7.7 thou/uL    Lymphocytes Absolute 2.5 0.8 - 4.4 thou/uL    Monocytes Absolute 0.6 0.0 - 0.9 thou/uL    Eosinophils Absolute 0.3 0.0 - 0.4 thou/uL    Basophils Absolute 0.1 0.0 - 0.2 thou/uL       Your labs are relatively normal  The Vitamin D level is low - sometimes people feel fatigue/depressed due to low levels of this vitamin.  I will send a prescription for a supplement - it will be for 50,000 units, take one capsule once a WEEK.  Recheck levels after 3 months.  Check with your pharmacy for your prescription.     Please call with questions or contact us using [employer].    Sincerely,        Electronically signed by Nori Lowry MD

## 2021-06-20 NOTE — LETTER
Letter by Nori Lowry MD at      Author: Nori Lowry MD Service: -- Author Type: --    Filed:  Encounter Date: 8/28/2020 Status: (Other)       08/28/20    RE: Ricarda Echavarria  1990    To Whom It May Concern:    Ricarda Echavarria was able to return to work on Tuesday August 25, 2020.  No restrictions.     Thank you for your attention to this matter.     Sincerely,        Nori Lowry MD

## 2021-06-20 NOTE — LETTER
Letter by Ruth Jhaveri DO at      Author: Ruth Jhaveri DO Service: -- Author Type: --    Filed:  Encounter Date: 10/7/2020 Status: (Other)         October 7, 2020     Patient: Ricarda Echavarria   YOB: 1990   Date of Visit: 10/7/2020       To Whom it May Concern:    Ricarda Echavarria was seen in my clinic on 10/7/2020.    If you have any questions or concerns, please don't hesitate to call.    Sincerely,         Electronically signed by Ruth Jhaveri DO

## 2021-06-20 NOTE — LETTER
Letter by Nori Lowry MD at      Author: Nori Lowry MD Service: -- Author Type: --    Filed:  Encounter Date: 8/28/2020 Status: (Other)

## 2021-06-21 NOTE — LETTER
Letter by Leander Winston MD at      Author: Leander Winston MD Service: -- Author Type: --    Filed:  Encounter Date: 1/25/2021 Status: (Other)         Re: Ricarda Echavarria    YOB: 1990        To whom it may concern,    This patient has had symptoms of sore throat headache cough body ache tightness in the chest.    She will be checked for COVID-19 as well as strep throat.    She should be excused from work starting 1/25/2021.    Return to work is pending her test results and her clinical improvement.        Sincerely,    Leander Winston MD    Electronically signed on 1/25/2021

## 2021-06-21 NOTE — LETTER
Letter by Nori Lowry MD at      Author: Nori Lowry MD Service: -- Author Type: --    Filed:  Encounter Date: 1/25/2021 Status: (Other)         Ricarda Echavarria  [address] [place] [address]             January 25, 2021         Dear Ms. Echavarria,    Below are the results from your recent visit:    Resulted Orders   Comprehensive Metabolic Panel   Result Value Ref Range    Sodium 137 136 - 145 mmol/L    Potassium 4.1 3.5 - 5.0 mmol/L    Chloride 106 98 - 107 mmol/L    CO2 21 (L) 22 - 31 mmol/L    Anion Gap, Calculation 10 5 - 18 mmol/L    Glucose 83 70 - 125 mg/dL    BUN 19 8 - 22 mg/dL    Creatinine 0.82 0.60 - 1.10 mg/dL    GFR MDRD Af Amer >60 >60 mL/min/1.73m2    GFR MDRD Non Af Amer >60 >60 mL/min/1.73m2    Bilirubin, Total 0.3 0.0 - 1.0 mg/dL    Calcium 8.8 8.5 - 10.5 mg/dL    Protein, Total 7.4 6.0 - 8.0 g/dL    Albumin 4.4 3.5 - 5.0 g/dL    Alkaline Phosphatase 73 45 - 120 U/L    AST 14 0 - 40 U/L    ALT 17 0 - 45 U/L    Narrative    Fasting Glucose reference range is 70-99 mg/dL per  American Diabetes Association (ADA) guidelines.   Thyroid Stimulating Hormone (TSH)   Result Value Ref Range    TSH 0.71 0.30 - 5.00 uIU/mL   Lipid Cascade FASTING   Result Value Ref Range    Cholesterol 171 <=199 mg/dL    Triglycerides 126 <=149 mg/dL    HDL Cholesterol 48 (L) >=50 mg/dL    LDL Calculated 98 <=129 mg/dL    Patient Fasting > 8hrs? No    HM1 (CBC with Diff)   Result Value Ref Range    WBC 8.0 4.0 - 11.0 thou/uL    RBC 4.41 3.80 - 5.40 mill/uL    Hemoglobin 13.5 12.0 - 16.0 g/dL    Hematocrit 39.3 35.0 - 47.0 %    MCV 89 80 - 100 fL    MCH 30.6 27.0 - 34.0 pg    MCHC 34.3 32.0 - 36.0 g/dL    RDW 11.1 11.0 - 14.5 %    Platelets 265 140 - 440 thou/uL    MPV 8.7 7.0 - 10.0 fL    Neutrophils % 54 50 - 70 %    Lymphocytes % 37 20 - 40 %    Monocytes % 7 2 - 10 %    Eosinophils % 2 0 - 6 %    Basophils % 1 0 - 2 %    Neutrophils Absolute 4.3 2.0 - 7.7 thou/uL    Lymphocytes  Absolute 3.0 0.8 - 4.4 thou/uL    Monocytes Absolute 0.5 0.0 - 0.9 thou/uL    Eosinophils Absolute 0.2 0.0 - 0.4 thou/uL    Basophils Absolute 0.1 0.0 - 0.2 thou/uL       Your labs all look okay.     Please call with questions or contact us using [email or web address].    Sincerely,        Electronically signed by Nori Lowry MD

## 2021-06-21 NOTE — LETTER
Letter by Kadie Mirza MD at      Author: Kadie Mirza MD Service: -- Author Type: --    Filed:  Encounter Date: 4/1/2021 Status: (Other)         April 1, 2021     Patient: Ricarda Echavarria   YOB: 1990   Date of Visit: 4/1/2021       To Whom It May Concern:    Due to confirmed Covid-19 exposure, Ricarda Echavarria has been asked to quarantine for 14 days, beginning 4/1/21.    If you have any questions or concerns, please don't hesitate to call.    Sincerely,        Electronically signed by Kadie Mirza MD

## 2021-06-21 NOTE — LETTER
Letter by Ivy Mercer RN at      Author: Ivy Mercer RN Service: -- Author Type: --    Filed:  Encounter Date: 5/11/2021 Status: (Other)         Ricarda WOOD Jericho  [address] [place] [address]             May 11, 2021         Dear Ms. Echavarria,    We are happy to inform you that your recent Pap smear and Human Papillomavirus (HPV) test results are normal and negative.    It is recommended that you have your next Pap smear and Human Papillomavirus (HPV) test in 1 year. You will also need to return to the clinic every year for an annual wellness visit.    If you have additional questions regarding this result, please contact our office and we will be happy to assist you.      Sincerely,    Your [facility] Care Team

## 2021-06-21 NOTE — LETTER
Letter by Kadie Mirza MD at      Author: Kadie Mirza MD Service: -- Author Type: --    Filed:  Encounter Date: 4/8/2021 Status: (Other)         April 8, 2021     Patient: Ricarda Echavarria   YOB: 1990   Date of Visit: 4/8/2021       To Whom It May Concern:    It is my medical opinion that Ricarda Echavarria may return to work without restrictions.    She was exposed to Covid-19 on 3/31/21.  Lab on 04/07/2021   Component Date Value Ref Range Status   ? SARS-CoV-2 PCR Result 04/07/2021 NEGATIVE   Final     Per CDC guidelines, OK to stop quarantine after day 7 after receiving a negative test result (test must occur on day 5 or later). After stopping quarantine, you should:    Watch for symptoms until 14 days after exposure.    If you have symptoms, immediately self-isolate and contact your local public health authority or healthcare provider.    Wear a mask, stay at least 6 feet from others, wash your hands, avoid crowds, and take other steps to prevent the spread of COVID-19.    If you have any questions or concerns, please don't hesitate to call.    Sincerely,        Electronically signed by Kadie Mirza MD

## 2021-06-25 NOTE — TELEPHONE ENCOUNTER
traZODone (DESYREL) 50 MG tablet [[id number]]    Electronically signed by: Fercho Rick MD on 04/22/21 1658 Status: Discontinued   Ordering user: Fercho Rick MD 04/22/21 1658 Authorized by: Fercho Rick MD   Frequency: [facility] 04/22/21 - 05/03/21  Released by: Fercho Rick MD 04/22/21 1658   Discontinued by: Nori Lowry MD 05/03/21 1632 [Side effects]   Diagnoses   Bacterial vaginitis [N76.0, B96.89]
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
## 2021-07-03 NOTE — ADDENDUM NOTE
Addendum Note by Lisbet Treadwell MD at 1/18/2018 11:57 AM     Author: Lisbet Treadwell MD Service: -- Author Type: Physician    Filed: 1/18/2018 11:57 AM Date of Service: 1/18/2018 11:57 AM Status: Signed    : Lisbet Treadwell MD (Physician)       Addendum  created 01/18/18 1157 by Lisbet Treadwell MD    Sign clinical note

## 2021-07-03 NOTE — ADDENDUM NOTE
Addendum Note by Lauren Gallardo PA-C at 11/2/2017  3:25 PM     Author: Lauren Gallardo PA-C Service: -- Author Type: Physician Assistant    Filed: 11/2/2017  3:25 PM Encounter Date: 11/2/2017 Status: Signed    : Lauren Gallardo PA-C (Physician Assistant)    Addended by: LAUREN GALLARDO on: 11/2/2017 03:25 PM        Modules accepted: Orders

## 2021-07-10 ENCOUNTER — HEALTH MAINTENANCE LETTER (OUTPATIENT)
Age: 31
End: 2021-07-10

## 2021-07-14 PROBLEM — Z97.5 IUD (INTRAUTERINE DEVICE) IN PLACE: Status: RESOLVED | Noted: 2017-11-02 | Resolved: 2020-10-12

## 2021-09-04 ENCOUNTER — HEALTH MAINTENANCE LETTER (OUTPATIENT)
Age: 31
End: 2021-09-04

## 2021-09-05 DIAGNOSIS — F41.9 ANXIETY: Primary | ICD-10-CM

## 2021-09-05 RX ORDER — PAROXETINE 10 MG/1
TABLET, FILM COATED ORAL
Qty: 90 TABLET | Refills: 2 | Status: SHIPPED | OUTPATIENT
Start: 2021-09-05 | End: 2022-01-04

## 2021-09-05 NOTE — TELEPHONE ENCOUNTER
"  Disp Refills Start End MARY KAY    PARoxetine (PAXIL) 10 MG tablet 30 tablet 2 5/3/2021  No   Sig: TAKE 1 TABLET(10 MG) BY MOUTH EVERY MORNING   Sent to pharmacy as: PARoxetine 10 mg tablet (PAXIL)   E-Prescribing Status: Receipt confirmed by pharmacy (5/3/2021  4:30 PM CDT)       Last Written Prescription Date:  5/3/21  Last Fill Quantity: 30,  # refills: 2   Last office visit provider:  5/3/21     Requested Prescriptions   Pending Prescriptions Disp Refills     PARoxetine (PAXIL) 10 MG tablet [Pharmacy Med Name: PAROXETINE 10MG TABLETS] 30 tablet      Sig: TAKE 1 TABLET(10 MG) BY MOUTH EVERY MORNING       SSRIs Protocol Failed - 9/5/2021  3:52 AM        Failed - Medication is active on med list        Passed - Recent (12 mo) or future (30 days) visit within the authorizing provider's specialty     Patient has had an office visit with the authorizing provider or a provider within the authorizing providers department within the previous 12 mos or has a future within next 30 days. See \"Patient Info\" tab in inbasket, or \"Choose Columns\" in Meds & Orders section of the refill encounter.              Passed - Patient is age 18 or older        Passed - No active pregnancy on record        Passed - No positive pregnancy test in last 12 months             Thierno Barrientos RN 09/05/21 1:35 PM  "

## 2021-09-07 ENCOUNTER — HOSPITAL ENCOUNTER (EMERGENCY)
Facility: HOSPITAL | Age: 31
Discharge: HOME | End: 2021-09-07
Attending: EMERGENCY MEDICINE | Admitting: EMERGENCY MEDICINE
Payer: COMMERCIAL

## 2021-09-07 ENCOUNTER — APPOINTMENT (OUTPATIENT)
Dept: CT IMAGING | Facility: HOSPITAL | Age: 31
End: 2021-09-07
Attending: EMERGENCY MEDICINE
Payer: COMMERCIAL

## 2021-09-07 VITALS
HEART RATE: 78 BPM | BODY MASS INDEX: 24.83 KG/M2 | DIASTOLIC BLOOD PRESSURE: 78 MMHG | TEMPERATURE: 98.7 F | WEIGHT: 155 LBS | SYSTOLIC BLOOD PRESSURE: 141 MMHG | RESPIRATION RATE: 18 BRPM | OXYGEN SATURATION: 99 %

## 2021-09-07 DIAGNOSIS — S09.90XA MINOR HEAD TRAUMA: ICD-10-CM

## 2021-09-07 DIAGNOSIS — R11.2 NON-INTRACTABLE VOMITING WITH NAUSEA, UNSPECIFIED VOMITING TYPE: ICD-10-CM

## 2021-09-07 LAB
ALBUMIN SERPL-MCNC: 4.1 G/DL (ref 3.5–5)
ALP SERPL-CCNC: 84 U/L (ref 45–120)
ALT SERPL W P-5'-P-CCNC: 37 U/L (ref 0–45)
ANION GAP SERPL CALCULATED.3IONS-SCNC: 7 MMOL/L (ref 5–18)
AST SERPL W P-5'-P-CCNC: 22 U/L (ref 0–40)
BASOPHILS # BLD AUTO: 0.1 10E3/UL (ref 0–0.2)
BASOPHILS NFR BLD AUTO: 1 %
BILIRUB SERPL-MCNC: 0.8 MG/DL (ref 0–1)
BUN SERPL-MCNC: 20 MG/DL (ref 8–22)
CALCIUM SERPL-MCNC: 9.2 MG/DL (ref 8.5–10.5)
CHLORIDE BLD-SCNC: 107 MMOL/L (ref 98–107)
CO2 SERPL-SCNC: 24 MMOL/L (ref 22–31)
CREAT SERPL-MCNC: 0.76 MG/DL (ref 0.6–1.1)
EOSINOPHIL # BLD AUTO: 0.1 10E3/UL (ref 0–0.7)
EOSINOPHIL NFR BLD AUTO: 2 %
ERYTHROCYTE [DISTWIDTH] IN BLOOD BY AUTOMATED COUNT: 11.9 % (ref 10–15)
GFR SERPL CREATININE-BSD FRML MDRD: >90 ML/MIN/1.73M2
GLUCOSE BLD-MCNC: 95 MG/DL (ref 70–125)
HCG SERPL QL: NEGATIVE
HCT VFR BLD AUTO: 38.8 % (ref 35–47)
HGB BLD-MCNC: 12.8 G/DL (ref 11.7–15.7)
IMM GRANULOCYTES # BLD: 0 10E3/UL
IMM GRANULOCYTES NFR BLD: 0 %
LIPASE SERPL-CCNC: 33 U/L (ref 0–52)
LYMPHOCYTES # BLD AUTO: 2.5 10E3/UL (ref 0.8–5.3)
LYMPHOCYTES NFR BLD AUTO: 33 %
MCH RBC QN AUTO: 29.4 PG (ref 26.5–33)
MCHC RBC AUTO-ENTMCNC: 33 G/DL (ref 31.5–36.5)
MCV RBC AUTO: 89 FL (ref 78–100)
MONOCYTES # BLD AUTO: 0.6 10E3/UL (ref 0–1.3)
MONOCYTES NFR BLD AUTO: 8 %
NEUTROPHILS # BLD AUTO: 4.4 10E3/UL (ref 1.6–8.3)
NEUTROPHILS NFR BLD AUTO: 56 %
NRBC # BLD AUTO: 0 10E3/UL
NRBC BLD AUTO-RTO: 0 /100
PLATELET # BLD AUTO: 280 10E3/UL (ref 150–450)
POTASSIUM BLD-SCNC: 3.6 MMOL/L (ref 3.5–5)
PROT SERPL-MCNC: 7.5 G/DL (ref 6–8)
RBC # BLD AUTO: 4.35 10E6/UL (ref 3.8–5.2)
SODIUM SERPL-SCNC: 138 MMOL/L (ref 136–145)
WBC # BLD AUTO: 7.6 10E3/UL (ref 4–11)

## 2021-09-07 PROCEDURE — 250N000013 HC RX MED GY IP 250 OP 250 PS 637: Performed by: EMERGENCY MEDICINE

## 2021-09-07 PROCEDURE — 84703 CHORIONIC GONADOTROPIN ASSAY: CPT | Performed by: EMERGENCY MEDICINE

## 2021-09-07 PROCEDURE — 83690 ASSAY OF LIPASE: CPT | Performed by: EMERGENCY MEDICINE

## 2021-09-07 PROCEDURE — 80053 COMPREHEN METABOLIC PANEL: CPT | Performed by: EMERGENCY MEDICINE

## 2021-09-07 PROCEDURE — 99284 EMERGENCY DEPT VISIT MOD MDM: CPT | Mod: 25

## 2021-09-07 PROCEDURE — 36415 COLL VENOUS BLD VENIPUNCTURE: CPT | Performed by: EMERGENCY MEDICINE

## 2021-09-07 PROCEDURE — 85025 COMPLETE CBC W/AUTO DIFF WBC: CPT | Performed by: EMERGENCY MEDICINE

## 2021-09-07 PROCEDURE — 70450 CT HEAD/BRAIN W/O DYE: CPT

## 2021-09-07 RX ORDER — ACETAMINOPHEN 325 MG/1
650 TABLET ORAL ONCE
Status: COMPLETED | OUTPATIENT
Start: 2021-09-07 | End: 2021-09-07

## 2021-09-07 RX ADMIN — ACETAMINOPHEN 650 MG: 325 TABLET ORAL at 17:51
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
## 2021-09-07 NOTE — Clinical Note
Ricarda Echavarria was seen and treated in our emergency department on 9/7/2021.  She may return to work on 09/09/2021.       If you have any questions or concerns, please don't hesitate to call.      Juan Manuel Choi MD

## 2021-09-08 ENCOUNTER — NURSE TRIAGE (OUTPATIENT)
Dept: NURSING | Facility: CLINIC | Age: 31
End: 2021-09-08

## 2021-09-08 ENCOUNTER — VIRTUAL VISIT (OUTPATIENT)
Dept: FAMILY MEDICINE | Facility: CLINIC | Age: 31
End: 2021-09-08
Payer: COMMERCIAL

## 2021-09-08 DIAGNOSIS — Z20.822 SUSPECTED COVID-19 VIRUS INFECTION: Primary | ICD-10-CM

## 2021-09-08 DIAGNOSIS — R11.2 NAUSEA AND VOMITING, INTRACTABILITY OF VOMITING NOT SPECIFIED, UNSPECIFIED VOMITING TYPE: ICD-10-CM

## 2021-09-08 PROBLEM — Z90.79 STATUS POST TAH-BSO: Status: ACTIVE | Noted: 2018-09-27

## 2021-09-08 PROBLEM — Z12.4 CERVICAL CANCER SCREENING: Status: ACTIVE | Noted: 2018-10-04

## 2021-09-08 PROBLEM — D06.9 CIN III (CERVICAL INTRAEPITHELIAL NEOPLASIA III): Status: RESOLVED | Noted: 2021-04-01 | Resolved: 2021-09-08

## 2021-09-08 PROBLEM — Z90.710 STATUS POST TAH-BSO: Status: ACTIVE | Noted: 2018-09-27

## 2021-09-08 PROBLEM — Z90.722 STATUS POST TAH-BSO: Status: ACTIVE | Noted: 2018-09-27

## 2021-09-08 PROCEDURE — 99213 OFFICE O/P EST LOW 20 MIN: CPT | Mod: 95 | Performed by: FAMILY MEDICINE

## 2021-09-08 RX ORDER — IBUPROFEN 200 MG
200 TABLET ORAL PRN
COMMUNITY

## 2021-09-08 RX ORDER — PROPRANOLOL HYDROCHLORIDE 20 MG/1
20 TABLET ORAL DAILY
COMMUNITY
Start: 2021-05-03 | End: 2022-01-24

## 2021-09-08 RX ORDER — PROMETHAZINE HYDROCHLORIDE 25 MG/1
25 TABLET ORAL EVERY 8 HOURS PRN
Qty: 10 TABLET | Refills: 0 | Status: SHIPPED | OUTPATIENT
Start: 2021-09-08 | End: 2022-04-26

## 2021-09-08 RX ORDER — AMLODIPINE BESYLATE 10 MG/1
1 TABLET ORAL DAILY
COMMUNITY
Start: 2019-11-22 | End: 2021-09-13

## 2021-09-08 NOTE — LETTER
September 8, 2021      Ricarda WOOD [place]  [address] [place] [address]        To Whom It May Concern:    Ricarda WOOD [place]  was seen on 09/08/21.  Please excuse her  until 9/15/2021 due to illness.        Sincerely,    Electronically signed.    Corby Melchor, DO

## 2021-09-08 NOTE — PROGRESS NOTES
Ricarda is a 31 year old who is being evaluated via a billable video visit.      How would you like to obtain your AVS? MyChart  If the video visit is dropped, the invitation should be resent by: Text to cell phone: [phone]  Will anyone else be joining your video visit? No      Video Start Time: 4:07 PM    Assessment & Plan   Problem List Items Addressed This Visit     None      Visit Diagnoses     Suspected COVID-19 virus infection    -  Primary    Based on symptoms, as well as recent contact, most consistent with Covid.  Will get testing for confirmation.  Work note for 10 days from onset of symptoms give    Relevant Orders    Symptomatic COVID-19 Virus (Coronavirus) by PCR    Nausea and vomiting, intractability of vomiting not specified, unspecified vomiting type        Symptomatic care.  Continue with small sips of water.  Phenergan for as needed use.  Side effects precautions reviewed.    Relevant Medications    promethazine (PHENERGAN) 25 MG tablet            Tobacco Cessation:   reports that she has been smoking cigarettes. She has been smoking about 0.25 packs per day. She has never used smokeless tobacco.      Return in about 8 months (around 5/3/2022) for Routine preventive.    Corby Melchor [facility]   Ricarda is a 31 year old who presents for the following health issues   Nausea, vomiting, diarrhea, headache, fevers and chills for the past 3 to 4 days.  Did go to the ER on 9/7 after possible concussion.  Concussion was ruled out.  Diarrhea started soon after that.  Of note she was contacted this morning by a friend that she was on vacation with during that timeframe that has tested positive for Covid.  She has had her Covid vaccinations.  None of her 4 kids at home are sick.  Has been tolerating small sips of water today which an improvement over yesterday.  Also stooling has slowed down considerably.  No blood in stools.  Also the fevers and chills seem  to be less than they were the past 2 days.         Review of Systems   All other systems reviewed and are negative.           Objective           Vitals:  No vitals were obtained today due to virtual visit.    Physical Exam  Nursing note reviewed.   Constitutional:       General: She is not in acute distress.     Appearance: Normal appearance. She is not ill-appearing.   HENT:      Head: Normocephalic and atraumatic.   Eyes:      Extraocular Movements: Extraocular movements intact.      Conjunctiva/sclera: Conjunctivae normal.   Pulmonary:      Effort: Pulmonary effort is normal.   Neurological:      Mental Status: She is alert and oriented to person, place, and time.   Psychiatric:         Attention and Perception: Attention normal.         Mood and Affect: Mood normal.         Speech: Speech normal.         Thought Content: Thought content normal.              Video-Visit Details    Type of service:  Video Visit    Video End Time:4:41 PM    Originating Location (pt. Location): Home    Distant Location (provider location):  [facility]     Platform used for Video Visit: Triptease
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
## 2021-09-13 DIAGNOSIS — I10 BENIGN ESSENTIAL HYPERTENSION: Primary | ICD-10-CM

## 2021-09-13 RX ORDER — AMLODIPINE BESYLATE 10 MG/1
TABLET ORAL
Qty: 30 TABLET | Refills: 1 | Status: SHIPPED | OUTPATIENT
Start: 2021-09-13 | End: 2021-11-18

## 2021-09-13 NOTE — TELEPHONE ENCOUNTER
"Routing refill request to provider for review/approval because:  Labs out of range:  Vital signs out of range    Last Written Prescription Date:8/28/2020   Last Fill Quantity: 30,  # refills: 11   Last office visit provider: 5/3/2021    Requested Prescriptions   Pending Prescriptions Disp Refills     amLODIPine (NORVASC) 10 MG tablet [Pharmacy Med Name: AMLODIPINE BESYLATE 10MG TABLETS] 30 tablet      Sig: TAKE 1 TABLET(10 MG) BY MOUTH DAILY       Calcium Channel Blockers Protocol  Failed - 9/13/2021  9:43 AM        Failed - Blood pressure under 140/90 in past 12 months     BP Readings from Last 3 Encounters:   09/07/21 (!) 141/78   05/03/21 126/78   04/22/21 130/88                 Passed - Recent (12 mo) or future (30 days) visit within the authorizing provider's specialty     Patient has had an office visit with the authorizing provider or a provider within the authorizing providers department within the previous 12 mos or has a future within next 30 days. See \"Patient Info\" tab in inbasket, or \"Choose Columns\" in Meds & Orders section of the refill encounter.              Passed - Medication is active on med list        Passed - Patient is age 18 or older        Passed - No active pregnancy on record        Passed - Normal serum creatinine on file in past 12 months     Recent Labs   Lab Test 09/07/21  1742   CR 0.76       Ok to refill medication if creatinine is low          Passed - No positive pregnancy test in past 12 months             Jeison Torres RN 09/13/21 9:44 AM  "

## 2021-10-12 ENCOUNTER — OFFICE VISIT (OUTPATIENT)
Dept: FAMILY MEDICINE | Facility: CLINIC | Age: 31
End: 2021-10-12
Payer: COMMERCIAL

## 2021-10-12 VITALS
SYSTOLIC BLOOD PRESSURE: 128 MMHG | WEIGHT: 155.6 LBS | BODY MASS INDEX: 24.93 KG/M2 | HEART RATE: 80 BPM | DIASTOLIC BLOOD PRESSURE: 68 MMHG

## 2021-10-12 DIAGNOSIS — R30.0 BURNING WITH URINATION: Primary | ICD-10-CM

## 2021-10-12 DIAGNOSIS — N39.0 RECURRENT UTI: ICD-10-CM

## 2021-10-12 DIAGNOSIS — F17.210 CIGARETTE NICOTINE DEPENDENCE WITHOUT COMPLICATION: ICD-10-CM

## 2021-10-12 DIAGNOSIS — F51.04 PSYCHOPHYSIOLOGICAL INSOMNIA: ICD-10-CM

## 2021-10-12 LAB
ALBUMIN UR-MCNC: NEGATIVE MG/DL
APPEARANCE UR: ABNORMAL
BACTERIA #/AREA URNS HPF: ABNORMAL /HPF
BILIRUB UR QL STRIP: NEGATIVE
COLOR UR AUTO: YELLOW
GLUCOSE UR STRIP-MCNC: NEGATIVE MG/DL
HGB UR QL STRIP: NEGATIVE
KETONES UR STRIP-MCNC: NEGATIVE MG/DL
LEUKOCYTE ESTERASE UR QL STRIP: ABNORMAL
NITRATE UR QL: NEGATIVE
PH UR STRIP: 7 [PH] (ref 5–8)
RBC #/AREA URNS AUTO: ABNORMAL /HPF
SP GR UR STRIP: 1.02 (ref 1–1.03)
SQUAMOUS #/AREA URNS AUTO: ABNORMAL /LPF
UROBILINOGEN UR STRIP-ACNC: 1 E.U./DL
WBC #/AREA URNS AUTO: ABNORMAL /HPF

## 2021-10-12 PROCEDURE — 99214 OFFICE O/P EST MOD 30 MIN: CPT | Performed by: FAMILY MEDICINE

## 2021-10-12 PROCEDURE — 81001 URINALYSIS AUTO W/SCOPE: CPT | Performed by: FAMILY MEDICINE

## 2021-10-12 RX ORDER — TRIMETHOPRIM 100 MG/1
100 TABLET ORAL DAILY PRN
Qty: 30 TABLET | Refills: 1 | Status: SHIPPED | OUTPATIENT
Start: 2021-10-12 | End: 2023-12-28

## 2021-10-12 RX ORDER — SULFAMETHOXAZOLE/TRIMETHOPRIM 800-160 MG
1 TABLET ORAL 2 TIMES DAILY
Qty: 10 TABLET | Refills: 0 | Status: SHIPPED | OUTPATIENT
Start: 2021-10-12 | End: 2021-10-17

## 2021-10-12 RX ORDER — HYDROXYZINE PAMOATE 50 MG/1
50 CAPSULE ORAL
Qty: 60 CAPSULE | Refills: 1 | Status: SHIPPED | OUTPATIENT
Start: 2021-10-12 | End: 2022-04-26
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
## 2021-10-17 ASSESSMENT — ENCOUNTER SYMPTOMS
SLEEP DISTURBANCE: 1
DYSURIA: 1

## 2021-10-19 ENCOUNTER — TELEPHONE (OUTPATIENT)
Dept: FAMILY MEDICINE | Facility: CLINIC | Age: 31
End: 2021-10-19

## 2021-10-19 PROBLEM — F32.9 MAJOR DEPRESSION: Status: ACTIVE | Noted: 2021-01-19

## 2021-10-19 NOTE — TELEPHONE ENCOUNTER
Reason for Call:  Other     Detailed comments: patient is calling today to say she was just treated for an uti and now has bacterial vaginosos. She is hoping for an rx ti help    Phone Number Patient can be reached at: Home number on file [phone] (home)    Best Time: asap     Can we leave a detailed message on this number? YES    Call taken on 10/19/2021 at 1:11 PM by Blanca Brooks

## 2021-10-20 ENCOUNTER — NURSE TRIAGE (OUTPATIENT)
Dept: FAMILY MEDICINE | Facility: CLINIC | Age: 31
End: 2021-10-20

## 2021-10-20 DIAGNOSIS — N89.8 VAGINAL DISCHARGE: Primary | ICD-10-CM

## 2021-10-20 NOTE — TELEPHONE ENCOUNTER
"Pt calling, having vaginal discharge. States it looks like normal vaginal discharge, but smells odorous. Been having it for about 1 week.  Per last visit, pt received UTI medication but it did not work. Pt thinking it is bacterial vaginosis, as this always happens if she has a UTI.    Per protocol, pt disposition \"See within 3 Days in Office.\" Pt would only like RX for bacterial vaginosis.     [facility])  [address] [place] [address]  Please advise.     HENRIETTA RothN, RN   [facility]      Additional Information    Negative: Pain or burning with passing urine (urination) is main symptom    Negative: Pregnant with vaginal discharge    Negative: SEVERE abdominal pain (e.g., excruciating)    Negative: Patient sounds very sick or weak to the triager    Negative: Yellow or green vaginal discharge and has a fever    Negative: Constant abdominal pain lasting > 2 hours    Negative: Mild lower abdominal pain comes and goes (cramps) that lasts > 24 hours    Negative: Genital area looks infected (e.g., draining sore, spreading redness)    Negative: Rash is tiny water blisters (3 or more)    Negative: Patient wants to be seen    Negative: Rash (e.g., redness, tiny bumps, sore) of genital area present > 24 hours    Negative: Abnormal color vaginal discharge (i.e., yellow, green, gray)    Negative: Symptoms of a yeast infection' (i.e., itchy, white discharge, not bad smelling) and not improved > 3 days following Care Advice    Negative: 4 or more episodes of vaginal infection in past year    Negative: Diabetes mellitus or weak immune system (e.g., HIV positive, cancer chemo, splenectomy, organ transplant, chronic steroids)    Negative: Patient is worried about a sexually transmitted disease (STD)    Negative: Pain with sexual intercourse (dyspareunia) (Exception: vaginal yeast infection suspected)    Symptoms of a vaginal yeast infection (i.e., white, thick, " "cottage-cheese-like, itchy, not bad smelling discharge)    Negative: Normal vaginal discharge    Bad smelling vaginal discharge     Pt requesting for bacterial vaginosis RX    Answer Assessment - Initial Assessment Questions  1. DISCHARGE: \"Describe the discharge.\" (e.g., white, yellow, green, gray, foamy, cottage cheese-like)      White    2. ODOR: \"Is there a bad odor?\"      Yes, bad odor    3. ONSET: \"When did the discharge begin?\"      Discharge begin about 1 week ago. Discharge constant, same amount.    4. RASH: \"Is there a rash in that area?\" If so, ask: \"Describe it.\" (e.g., redness, blisters, sores, bumps)      No rash, but does itch.    5. ABDOMINAL PAIN: \"Are you having any abdominal pain?\" If yes: \"What does it feel like? \" (e.g., crampy, dull, intermittent, constant)       Nope    6. ABDOMINAL PAIN SEVERITY: If present, ask: \"How bad is it?\"  (e.g., mild, moderate, severe)   - MILD - doesn't interfere with normal activities    - MODERATE - interferes with normal activities or awakens from sleep    - SEVERE - patient doesn't want to move (R/O peritonitis)       N/A    7. CAUSE: \"What do you think is causing the discharge?\" \"Have you had the same problem before? What happened then?\"      Normal about vaginal discharge, but it smells    8. OTHER SYMPTOMS: \"Do you have any other symptoms?\" (e.g., fever, itching, vaginal bleeding, pain with urination, injury to genital area, vaginal foreign body)      Nope    9. PREGNANCY: \"Is there any chance you are pregnant?\" \"When was your last menstrual period?\"      N/A Pt had hyterectomy    Protocols used: VAGINAL [id number]      "

## 2021-10-20 NOTE — TELEPHONE ENCOUNTER
Pt called back. Please see Nurse Triage encounter for more information.    HENRIETTA RothN, RN   [facility]

## 2021-10-20 NOTE — TELEPHONE ENCOUNTER
RN attempt to call pt. No answer. Left non-detailed VM with call back number.    HENRIETTA RothN, RN   [facility]

## 2021-10-21 RX ORDER — METRONIDAZOLE 500 MG/1
500 TABLET ORAL 2 TIMES DAILY
Qty: 14 TABLET | Refills: 0 | Status: SHIPPED | OUTPATIENT
Start: 2021-10-21 | End: 2021-10-28

## 2021-10-21 NOTE — TELEPHONE ENCOUNTER
I sent her a prescription for BV.  It is possible this could be a different kind of infection, so if this medicine does not work she needs to be seen in clinic for an exam.

## 2021-10-21 NOTE — TELEPHONE ENCOUNTER
DOD,    PCP off today. Please review and advise.    HENRIETTA RothN, RN   [facility]

## 2021-10-21 NOTE — TELEPHONE ENCOUNTER
Patient is calling back to check on the status of this request. She stated that she needs this RX asap as she smells really bad. PCP is out of office, routing to DOD.   Please send RX to pharmacy if appropriate.

## 2021-10-22 NOTE — TELEPHONE ENCOUNTER
RN spoke to pt regarding Denice's message below. Pt verbalizes understanding and had already picked up RX. No further actions needed.    HENRIETTA RothN, RN   [facility]

## 2021-11-10 DIAGNOSIS — R30.9 PAINFUL URINATION: Primary | ICD-10-CM

## 2021-11-11 ENCOUNTER — VIRTUAL VISIT (OUTPATIENT)
Dept: FAMILY MEDICINE | Facility: CLINIC | Age: 31
End: 2021-11-11
Payer: COMMERCIAL

## 2021-11-11 DIAGNOSIS — N39.0 URINARY TRACT INFECTION WITHOUT HEMATURIA, SITE UNSPECIFIED: Primary | ICD-10-CM

## 2021-11-11 PROCEDURE — 99213 OFFICE O/P EST LOW 20 MIN: CPT | Mod: 95 | Performed by: FAMILY MEDICINE

## 2021-11-11 RX ORDER — NITROFURANTOIN 25; 75 MG/1; MG/1
100 CAPSULE ORAL 2 TIMES DAILY
Qty: 14 CAPSULE | Refills: 0 | Status: SHIPPED | OUTPATIENT
Start: 2021-11-11 | End: 2021-11-18

## 2021-11-11 ASSESSMENT — ASTHMA QUESTIONNAIRES
QUESTION_2 LAST FOUR WEEKS HOW OFTEN HAVE YOU HAD SHORTNESS OF BREATH: NOT AT ALL
QUESTION_4 LAST FOUR WEEKS HOW OFTEN HAVE YOU USED YOUR RESCUE INHALER OR NEBULIZER MEDICATION (SUCH AS ALBUTEROL): NOT AT ALL
QUESTION_1 LAST FOUR WEEKS HOW MUCH OF THE TIME DID YOUR ASTHMA KEEP YOU FROM GETTING AS MUCH DONE AT WORK, SCHOOL OR AT HOME: NONE OF THE TIME
ACT_TOTALSCORE: 25
QUESTION_3 LAST FOUR WEEKS HOW OFTEN DID YOUR ASTHMA SYMPTOMS (WHEEZING, COUGHING, SHORTNESS OF BREATH, CHEST TIGHTNESS OR PAIN) WAKE YOU UP AT NIGHT OR EARLIER THAN USUAL IN THE MORNING: NOT AT ALL
QUESTION_5 LAST FOUR WEEKS HOW WOULD YOU RATE YOUR ASTHMA CONTROL: COMPLETELY CONTROLLED

## 2021-11-11 NOTE — PROGRESS NOTES
"Ricarda is a 31 year old who is being evaluated via a billable telephone visit.      What phone number would you like to be contacted at? [phone]  How would you like to obtain your AVS? MyChart    Assessment & Plan     Urinary tract infection without hematuria, site unspecified  - nitroFURantoin macrocrystal-monohydrate (MACROBID) 100 MG capsule; Take 1 capsule (100 mg) by mouth 2 times daily for 7 days  - UA reflex to Microscopic and Culture; Future  Symptoms are consistent with possible urinary tract infection, discussed option, patient interested on started presumptive therapy with Macrobid, new prescription sent, possible side effect discussed encourage good hydration, recheck urine, office visit or ER if symptoms get worse.  Review of external notes as documented elsewhere in note  12 minutes spent on the date of the encounter doing chart review, review of outside records, review of test results, interpretation of tests, patient visit and documentation        Tobacco Cessation:   reports that she has been smoking cigarettes. She has been smoking about 0.25 packs per day. She has never used smokeless tobacco.      FUTURE LABS:       - UA/UC    No follow-ups on file.    Adolfo Zambrano MD  [facility]    Subjective   Ricarda is a 31 year old who presents for the following health issues     HPI   History of recurrent bladder infection in the past, she stating \"the story of my life\", current symptoms included bladder pain, urine odor, dysuria for the past few days, she was prescribed trimethoprim for UTI prevention but did not seem to help, she was treated with Bactrim but did not help improve her symptoms.  She denies any fever chills or flank pain.  Most of her symptoms are in the suprapubic area.    She does have a history of hysterectomy.    Review of Systems   Constitutional, HEENT, cardiovascular, pulmonary, gi and gu systems are negative, except as otherwise noted.      Objective     "       Vitals:  No vitals were obtained today due to virtual visit.    Physical Exam   healthy, alert and no distress  PSYCH: Alert and oriented times 3; coherent speech, normal   rate and volume, able to articulate logical thoughts, able   to abstract reason, no tangential thoughts, no hallucinations   or delusions  Her affect is normal  RESP: No cough, no audible wheezing, able to talk in full sentences  Remainder of exam unable to be completed due to telephone visits          Phone call duration: 12 minutes

## 2021-11-12 ASSESSMENT — PATIENT HEALTH QUESTIONNAIRE - PHQ9: SUM OF ALL RESPONSES TO PHQ QUESTIONS 1-9: 0

## 2021-11-12 ASSESSMENT — ASTHMA QUESTIONNAIRES: ACT_TOTALSCORE: 25

## 2021-11-17 DIAGNOSIS — I10 BENIGN ESSENTIAL HYPERTENSION: ICD-10-CM

## 2021-11-18 RX ORDER — AMLODIPINE BESYLATE 10 MG/1
10 TABLET ORAL DAILY
Qty: 90 TABLET | Refills: 3 | Status: SHIPPED | OUTPATIENT
Start: 2021-11-18 | End: 2022-12-19

## 2021-11-18 NOTE — TELEPHONE ENCOUNTER
"Last Written Prescription Date:  9/13/21  Last Fill Quantity: 30,  # refills: 1   Last office visit provider:  11/11/21     Requested Prescriptions   Pending Prescriptions Disp Refills     amLODIPine (NORVASC) 10 MG tablet [Pharmacy Med Name: AMLODIPINE BESYLATE 10MG TABLETS] 30 tablet 1     Sig: TAKE 1 TABLET(10 MG) BY MOUTH DAILY       Calcium Channel Blockers Protocol  Passed - 11/17/2021  3:54 AM        Passed - Blood pressure under 140/90 in past 12 months     BP Readings from Last 3 Encounters:   10/12/21 128/68   09/07/21 (!) 141/78   05/03/21 126/78                 Passed - Recent (12 mo) or future (30 days) visit within the authorizing provider's specialty     Patient has had an office visit with the authorizing provider or a provider within the authorizing providers department within the previous 12 mos or has a future within next 30 days. See \"Patient Info\" tab in inbasket, or \"Choose Columns\" in Meds & Orders section of the refill encounter.              Passed - Medication is active on med list        Passed - Patient is age 18 or older        Passed - No active pregnancy on record        Passed - Normal serum creatinine on file in past 12 months     Recent Labs   Lab Test 09/07/21  1742   CR 0.76       Ok to refill medication if creatinine is low          Passed - No positive pregnancy test in past 12 months             Thierno Barrientos RN 11/18/21 2:37 PM  "

## 2021-12-08 DIAGNOSIS — F41.9 ANXIETY: ICD-10-CM

## 2021-12-09 RX ORDER — PAROXETINE 10 MG/1
TABLET, FILM COATED ORAL
Qty: 30 TABLET | OUTPATIENT
Start: 2021-12-09

## 2022-01-01 DIAGNOSIS — F41.9 ANXIETY: ICD-10-CM

## 2022-01-03 DIAGNOSIS — F41.9 ANXIETY: ICD-10-CM

## 2022-01-04 RX ORDER — PAROXETINE 10 MG/1
TABLET, FILM COATED ORAL
Qty: 90 TABLET | Refills: 0 | Status: SHIPPED | OUTPATIENT
Start: 2022-01-04 | End: 2022-04-05

## 2022-01-06 RX ORDER — PAROXETINE 10 MG/1
TABLET, FILM COATED ORAL
Qty: 30 TABLET | OUTPATIENT
Start: 2022-01-06

## 2022-01-22 DIAGNOSIS — R00.2 PALPITATIONS: Primary | ICD-10-CM

## 2022-01-24 RX ORDER — PROPRANOLOL HYDROCHLORIDE 20 MG/1
TABLET ORAL
Qty: 180 TABLET | Refills: 3 | Status: SHIPPED | OUTPATIENT
Start: 2022-01-24 | End: 2023-03-28

## 2022-01-24 NOTE — TELEPHONE ENCOUNTER
"Outpatient Medication Detail     Disp Refills Start End MARY KAY   propranoloL (INDERAL) 20 MG tablet 60 tablet 2 5/3/2021  No   Sig - Route: Take 1 tablet (20 mg total) by mouth 2 (two) times a day. - Oral   Sent to pharmacy as: propranoloL 20 mg tablet (INDERAL)   E-Prescribing Status: Receipt confirmed by pharmacy (5/3/2021  4:30 PM CDT)       Last office visit provider:  11/11/21     Requested Prescriptions   Pending Prescriptions Disp Refills     propranolol (INDERAL) 20 MG tablet [Pharmacy Med Name: PROPRANOLOL 20MG TABLETS] 60 tablet      Sig: TAKE 1 TABLET(20 MG) BY MOUTH TWICE DAILY       Beta-Blockers Protocol Passed - 1/22/2022  3:54 AM        Passed - Blood pressure under 140/90 in past 12 months     BP Readings from Last 3 Encounters:   10/12/21 128/68   09/07/21 (!) 141/78   05/03/21 126/78                 Passed - Patient is age 6 or older        Passed - Recent (12 mo) or future (30 days) visit within the authorizing provider's specialty     Patient has had an office visit with the authorizing provider or a provider within the authorizing providers department within the previous 12 mos or has a future within next 30 days. See \"Patient Info\" tab in inbasket, or \"Choose Columns\" in Meds & Orders section of the refill encounter.              Passed - Medication is active on med list             Thierno Barrientos RN 01/24/22 2:47 PM  "

## 2022-02-02 ENCOUNTER — OFFICE VISIT (OUTPATIENT)
Dept: FAMILY MEDICINE | Facility: CLINIC | Age: 32
End: 2022-02-02
Payer: COMMERCIAL

## 2022-02-02 VITALS
HEART RATE: 108 BPM | BODY MASS INDEX: 24.51 KG/M2 | RESPIRATION RATE: 16 BRPM | DIASTOLIC BLOOD PRESSURE: 82 MMHG | WEIGHT: 153 LBS | SYSTOLIC BLOOD PRESSURE: 136 MMHG

## 2022-02-02 DIAGNOSIS — R42 VERTIGO: ICD-10-CM

## 2022-02-02 DIAGNOSIS — N76.0 BACTERIAL VAGINOSIS: ICD-10-CM

## 2022-02-02 DIAGNOSIS — B96.89 BACTERIAL VAGINOSIS: ICD-10-CM

## 2022-02-02 DIAGNOSIS — N39.0 URINARY TRACT INFECTION WITHOUT HEMATURIA, SITE UNSPECIFIED: Primary | ICD-10-CM

## 2022-02-02 LAB
ALBUMIN UR-MCNC: ABNORMAL MG/DL
APPEARANCE UR: CLEAR
BACTERIA #/AREA URNS HPF: ABNORMAL /HPF
BILIRUB UR QL STRIP: NEGATIVE
COLOR UR AUTO: YELLOW
GLUCOSE UR STRIP-MCNC: NEGATIVE MG/DL
HGB UR QL STRIP: ABNORMAL
KETONES UR STRIP-MCNC: NEGATIVE MG/DL
LEUKOCYTE ESTERASE UR QL STRIP: ABNORMAL
NITRATE UR QL: NEGATIVE
PH UR STRIP: 5.5 [PH] (ref 5–8)
RBC #/AREA URNS AUTO: ABNORMAL /HPF
SP GR UR STRIP: >=1.03 (ref 1–1.03)
SQUAMOUS #/AREA URNS AUTO: ABNORMAL /LPF
UROBILINOGEN UR STRIP-ACNC: 0.2 E.U./DL
WBC #/AREA URNS AUTO: ABNORMAL /HPF

## 2022-02-02 PROCEDURE — 81001 URINALYSIS AUTO W/SCOPE: CPT | Performed by: PHYSICIAN ASSISTANT

## 2022-02-02 PROCEDURE — 87086 URINE CULTURE/COLONY COUNT: CPT | Performed by: PHYSICIAN ASSISTANT

## 2022-02-02 PROCEDURE — 99214 OFFICE O/P EST MOD 30 MIN: CPT | Performed by: PHYSICIAN ASSISTANT

## 2022-02-02 RX ORDER — SULFAMETHOXAZOLE/TRIMETHOPRIM 800-160 MG
1 TABLET ORAL 2 TIMES DAILY
Qty: 6 TABLET | Refills: 0 | Status: SHIPPED | OUTPATIENT
Start: 2022-02-02 | End: 2022-02-05

## 2022-02-02 RX ORDER — METRONIDAZOLE 7.5 MG/G
GEL TOPICAL
Qty: 45 G | Refills: 1 | Status: SHIPPED | OUTPATIENT
Start: 2022-02-02 | End: 2023-12-28

## 2022-02-02 RX ORDER — METRONIDAZOLE 500 MG/1
500 TABLET ORAL 2 TIMES DAILY
Qty: 14 TABLET | Refills: 0 | Status: SHIPPED | OUTPATIENT
Start: 2022-02-02 | End: 2022-02-09

## 2022-02-02 RX ORDER — TRIAMTERENE AND HYDROCHLOROTHIAZIDE 37.5; 25 MG/1; MG/1
CAPSULE ORAL
COMMUNITY
Start: 2021-12-08 | End: 2023-12-28

## 2022-02-02 NOTE — PROGRESS NOTES
Chief Complaint:  chief complaint    HPI:   Ricarda Echavarria is a 31 year old female with chief complaint of several concerns:    1.  Possible UTI.  She feels like she gets frequent UTIs.  She has tried several different medicines before to treat them.  She says one time she did have a kidney infection.  Right now she is having burning with urination for 2 days.  Also having a little bit of right-sided low back pain.  No fevers.  In reviewing her chart, last UTI visit that I can find was a virtual visit on 11/11/2021, treated with Macrobid.  Because this was a virtual visit, actual urine culture was not done.    2.  BV infection.  She feels like she has BV all the time.  Same partner for many years.  This has not been a problem until more recently.  She is mainly taking the pills.  She says is bothering her enough that she now is getting not interested in having sex with her partner.    3.  Vertigo.  She has had this problem for a while.  She says she has discussed it with her provider previously.  It was more occasional before, seems to be getting worse.  She notices a little bit when she goes to bed, notices it worst when she wakes up and tries to get out of bed in the morning.    She is also seen at the ER recently for chest pain.  Cardiac work-up including EKG and echo were normal.    She continues to have occasional sharp chest pains.  She is not sure what this is caused by.  She is taking her omeprazole.    Patient Active Problem List   Diagnosis     Mild intermittent asthma without complication     History of thrombocytopenia     Migraine with aura and without status migrainosus, not intractable     Continuous nicotine dependence     Severe major depression (H)     Asthma     Cervical cancer screening     Status post MADDISON-BSO       Current Outpatient Medications:      amLODIPine (NORVASC) 10 MG tablet, Take 1 tablet (10 mg) by mouth daily, Disp: 90 tablet, Rfl: 3     hydrOXYzine (VISTARIL) 50 MG capsule, Take 1  capsule (50 mg) by mouth at bedtime as needed, may repeat once for anxiety (poor sleep), Disp: 60 capsule, Rfl: 1     ibuprofen (ADVIL/MOTRIN) 200 MG tablet, Take 200 mg by mouth as needed, Disp: , Rfl:      metroNIDAZOLE (FLAGYL) 500 MG tablet, Take 1 tablet (500 mg) by mouth 2 times daily for 7 days, Disp: 14 tablet, Rfl: 0     metroNIDAZOLE (METROGEL) 0.75 % external gel, Insert vaginally once at night for 5 nights, or as directed, Disp: 45 g, Rfl: 1     omeprazole (PRILOSEC) 20 MG DR capsule, , Disp: , Rfl:      PARoxetine (PAXIL) 10 MG tablet, TAKE 1 TABLET(10 MG) BY MOUTH EVERY MORNING, Disp: 90 tablet, Rfl: 0     promethazine (PHENERGAN) 25 MG tablet, Take 1 tablet (25 mg) by mouth every 8 hours as needed for nausea, Disp: 10 tablet, Rfl: 0     propranolol (INDERAL) 20 MG tablet, TAKE 1 TABLET(20 MG) BY MOUTH TWICE DAILY, Disp: 180 tablet, Rfl: 3     sulfamethoxazole-trimethoprim (BACTRIM DS) 800-160 MG tablet, Take 1 tablet by mouth 2 times daily for 3 days, Disp: 6 tablet, Rfl: 0     triamterene-HCTZ (DYAZIDE) 37.5-25 MG capsule, , Disp: , Rfl:      trimethoprim (TRIMPEX) 100 MG tablet, Take 1 tablet (100 mg) by mouth daily as needed (UTI prevention), Disp: 30 tablet, Rfl: 1     TYLENOL 325 MG OR TABS, unsure of mgs.  prn, per dad, Disp: , Rfl: 0     varenicline (CHANTIX EB) 0.5 MG X 11 & 1 MG X 42 tablet, Take 0.5 mg tab daily for 3 days, THEN 0.5 mg tab twice daily for 4 days, THEN 1 mg twice daily., Disp: 53 tablet, Rfl: 0    Objective:  Allergies:  Allergies   Allergen Reactions     Zofran [Ondansetron] Headache       Vitals:    02/02/22 0752   BP: 136/82   BP Location: Right arm   Patient Position: Sitting   Cuff Size: Adult Regular   Pulse: 108   Resp: 16   Weight: 69.4 kg (153 lb)     Body mass index is 24.51 kg/m .    Vital signs reviewed  General: Patient is alert and oriented x 3, in no apparent distress  Cardiac: Regular rate and rhythm, no murmurs  Lungs: Clear to auscultation bilaterally, No  crackles, rales, rhonchi, or wheezing noted  Abdomen: Non tender to palpation, no hepatosplenomegaly, negative Gray's sign, no pain over McBurney's point, positive bowel sounds, no masses palpable, no CVA tenderness bilaterally    Results for orders placed or performed in visit on 02/02/22   UA reflex to Microscopic     Status: Abnormal   Result Value Ref Range    Color Urine Yellow Colorless, Straw, Light Yellow, Yellow    Appearance Urine Clear Clear    Glucose Urine Negative Negative mg/dL    Bilirubin Urine Negative Negative    Ketones Urine Negative Negative mg/dL    Specific Gravity Urine >=1.030 1.005 - 1.030    Blood Urine Trace (A) Negative    pH Urine 5.5 5.0 - 8.0    Protein Albumin Urine Trace (A) Negative mg/dL    Urobilinogen Urine 0.2 0.2, 1.0 E.U./dL    Nitrite Urine Negative Negative    Leukocyte Esterase Urine Trace (A) Negative   Urine Microscopic Exam     Status: Abnormal   Result Value Ref Range    Bacteria Urine Few (A) None Seen /HPF    RBC Urine 0-2 0-2 /HPF /HPF    WBC Urine 5-10 (A) 0-5 /HPF /HPF    Squamous Epithelials Urine Many (A) None Seen /LPF     Urine culture is pending.    Assessment and Plan:  1. Urinary tract infection without hematuria, site unspecified  Treated for probable UTI with Bactrim.  I will follow-up with urine culture and susceptibilities when available.  Patient does feel like she has frequent UTIs.  Could consider urology referral in the future if appropriate.  - UA reflex to Microscopic; Future  - Urine Culture; Future  - UA reflex to Microscopic  - Urine Culture  - Urine Microscopic Exam  - sulfamethoxazole-trimethoprim (BACTRIM DS) 800-160 MG tablet; Take 1 tablet by mouth 2 times daily for 3 days  Dispense: 6 tablet; Refill: 0    2. Bacterial vaginosis  Fairly chronic BV infections.  No obvious trigger.  Metronidazole pills prescribed.  Also prescription sent for MetroGel to use as needed.  Continue to monitor.  Follow-up as needed.  - metroNIDAZOLE (METROGEL)  0.75 % external gel; Insert vaginally once at night for 5 nights, or as directed  Dispense: 45 g; Refill: 1  - metroNIDAZOLE (FLAGYL) 500 MG tablet; Take 1 tablet (500 mg) by mouth 2 times daily for 7 days  Dispense: 14 tablet; Refill: 0    3. Vertigo  Intermittent vertigo, seems to maybe be getting a little worse.  Printed information given out to her on Epley's maneuvers to do at home.  Also referral put in for vestibular rehab.  - Physical Therapy Referral; Future      This dictation uses voice recognition software, which may contain typographical errors.

## 2022-02-03 LAB — BACTERIA UR CULT: NO GROWTH

## 2022-03-02 ENCOUNTER — HOSPITAL ENCOUNTER (OUTPATIENT)
Dept: PHYSICAL THERAPY | Facility: REHABILITATION | Age: 32
End: 2022-03-02
Attending: PHYSICIAN ASSISTANT
Payer: COMMERCIAL

## 2022-03-02 DIAGNOSIS — R42 DIZZINESS: Primary | ICD-10-CM

## 2022-03-02 DIAGNOSIS — R42 VERTIGO: ICD-10-CM

## 2022-03-02 PROCEDURE — 97535 SELF CARE MNGMENT TRAINING: CPT | Mod: GP | Performed by: PHYSICAL THERAPIST

## 2022-03-02 PROCEDURE — 97161 PT EVAL LOW COMPLEX 20 MIN: CPT | Mod: GP | Performed by: PHYSICAL THERAPIST

## 2022-03-02 PROCEDURE — 97112 NEUROMUSCULAR REEDUCATION: CPT | Mod: GP | Performed by: PHYSICAL THERAPIST

## 2022-03-02 NOTE — PROGRESS NOTES
03/02/22 0900   Quick Adds   Quick Adds Vestibular Eval   General Information   Start of Care Date 03/02/22   Referring Physician Denice Gallardo    Orders Evaluate and Treat as Indicated   Order Date 02/02/22   Medical Diagnosis Vertigo   Onset of illness/injury or Date of Surgery 01/01/20   Pertinent history of current problem (include personal factors and/or comorbidities that impact the POC) Pt reports once years ago upon waking up the room was spinning; she got sick and her eyes were shaking; the symptoms lasted one day (her eyes were moving that whole day). Ever since then she'll have random symptoms. Often when waking up and the room will be spinning - sometimes it can last up to a few hours. When having a dizzy spell she feels out of it the rest of the day. She can feel confused and groggy. She will feel unsteady/unbalanced. She sits at a desk for work, but twice has had dizzy attacks at work - will get extremely hot, vomit and has to lay her head down (will feel like the room is spinning). Certain days she is perfectly fine and has absolutely no symptoms. In a month she might have 1-3 attacks. When she turns off her light at night to sleep, she feels a little weird/unsteady, but no true room spinning. Went on a plane in 2021 and this was terrible - she was vomiting, spinning, felt horrible; once she landed she felt absolutely fine. No history of motion sickness as a child. This weekend she went down a waterslide and this caused severe symptoms so she had to leave. She can't play with her kids normally d/t dizziness. Has had migraines since she was a child - they happen 2-3 times/week. She will get light/sound sensitivity, has an aura as well. Sometimes can feel nauseous if her head is very painful - she used to take meds, but doesn't any more (hasn't for a few years). Hasn't seen an ENT yet   Patient/Family Goals Statement To get better    General Information Comments Has had head CT and MRI which  were negative - no MRA has been.    Fall Risk Screen   Fall screen completed by PT   Have you fallen 2 or more times in the past year? No   Have you fallen and had an injury in the past year? No   Is patient a fall risk? No   Abuse Screen (yes response referral indicated)   Feels Unsafe at Home or Work/School no   Feels Threatened by Someone no   Does Anyone Try to Keep You From Having Contact with Others or Doing Things Outside Your Home? no   Physical Signs of Abuse Present no   System Outcome Measures   Outcome Measures BPPV   Dizziness Handicap Inventory (score out of 100) A decrease in score by 17.18 or greater indicates a clinically significant change in symptoms. 80   Cervicogenic Screen   Neck ROM flx has pressure into top of head but ROM is WNL, ext WNL, L rotation WNL with neck pain, R rotation WNL feels tighter but less pain, L side bending with contralateral tightness, R side bending with tightness and pain returning to neutral    Vertebral Artery Test Abnormal   Vertebral Artery Test Comments positive on R side after 10 sec - felt vision going spotty; normal on L side    Distraction Comments Palpation: tender B suboccipitals (no dizziness elicited)   Oculomotor Exam   Smooth Pursuit Normal   Smooth Pursuit Comment no dizziness   Saccades Abnormal   Saccades Comments slow, symptomatic   VOR Abnormal   VOR Comments immediate symptoms, slow pace    Infrared Goggle Exam or Frenzel Lense Exam   Vestibular Suppressant in Last 24 Hours? No   Exam completed with Room Light   Spontaneous Nystagmus Negative   Gaze Evoked Nystagmus Negative   Planned Therapy Interventions   Planned Therapy Interventions ADL retraining;balance training;bed mobility training;gait training;joint mobilization;motor coordination training;neuromuscular re-education;ROM;strengthening;stretching;transfer training;manual therapy   Clinical Impression   Criteria for Skilled Therapeutic Interventions Met yes, treatment indicated   PT  "Diagnosis Dizziness   Influenced by the following impairments dizziness, neck pain   Functional limitations due to impairments difficulty working, showering, playing with her kids and stair negotiation d/t dizziness    Clinical Presentation Stable/Uncomplicated   Clinical Decision Making (Complexity) Low complexity   Therapy Frequency 1 time/week   Predicted Duration of Therapy Intervention (days/wks) 10 visits   Risk & Benefits of therapy have been explained Yes   Patient, Family & other staff in agreement with plan of care Yes   Clinical Impression Comments Pt reports dizziness as chief complaint. Symptoms most consistent with vestibular migraine (with possibility of Meniere's disease). Pt reports dizziness with various activities - showering, stair negotiation, playing with her kids, as well as sudden \"attacks\" of dizziness that cause the room to spin and these can last a few hours at a time and occur 1-3 times/month. She hasn't seen an ENT yet and today R-sided vertebral artery testing was positive, so asked referring provider for imaging and referral to ENT. Did not test for BPPV today d/t positive artery test. Pt provided initial information about migraine management and will continue to focus on vestibular adaptation/habituation, cervical spine contributions and home management going forward.    GOALS   PT Eval Goals 1;2;3   Goal 1   Goal Identifier DHI   Goal Description Pt will improve DHI score from 80 points to <40 points to indicate improved symptoms and improved quality of life   Target Date 05/11/22   Goal 2   Goal Identifier Stairs   Goal Description Pt will report ability to negotiate stairs at home without dizziness.    Target Date 05/11/22   Goal 3   Goal Identifier Showering   Goal Description Pt will report ability to tolerate taking a shower without dizziness.    Target Date 05/11/22   Total Evaluation Time   PT Eval, Low Complexity Minutes (78405) 23     Joel Benton, PT, DPT, CLT  3/2/2022 "

## 2022-03-08 DIAGNOSIS — R42 VERTIGO: Primary | ICD-10-CM

## 2022-03-09 ENCOUNTER — TELEPHONE (OUTPATIENT)
Dept: FAMILY MEDICINE | Facility: CLINIC | Age: 32
End: 2022-03-09
Payer: COMMERCIAL

## 2022-03-09 DIAGNOSIS — R42 VERTIGO: Primary | ICD-10-CM

## 2022-03-09 NOTE — TELEPHONE ENCOUNTER
Yes I was in the process of ordering the MRI.    It's in now, someone should be calling her to schedule.

## 2022-03-09 NOTE — TELEPHONE ENCOUNTER
Reason for Call:  Other     Detailed comments: patient is calling today to say ariel sent her to see a specialist for vertigo. The specialist asked ariel to put in a ref for some testing. ariel entered an ent ref and the patient said she needs an mri. Can we call patient to clarify please?    Phone Number Patient can be reached at: Other phone number:  [phone]    Best Time: asap    Can we leave a detailed message on this number? YES    Call taken on 3/9/2022 at 11:35 AM by Blanca Brooks

## 2022-03-31 ENCOUNTER — HOSPITAL ENCOUNTER (OUTPATIENT)
Dept: MRI IMAGING | Facility: HOSPITAL | Age: 32
Discharge: HOME | End: 2022-03-31
Attending: PHYSICIAN ASSISTANT
Payer: COMMERCIAL

## 2022-03-31 DIAGNOSIS — R42 VERTIGO: ICD-10-CM

## 2022-03-31 PROCEDURE — A9585 GADOBUTROL INJECTION: HCPCS | Performed by: PHYSICIAN ASSISTANT

## 2022-03-31 PROCEDURE — 255N000002 HC RX 255 OP 636: Performed by: PHYSICIAN ASSISTANT

## 2022-03-31 PROCEDURE — 70549 MR ANGIOGRAPH NECK W/O&W/DYE: CPT

## 2022-03-31 PROCEDURE — 70544 MR ANGIOGRAPHY HEAD W/O DYE: CPT

## 2022-03-31 RX ORDER — GADOBUTROL 604.72 MG/ML
7 INJECTION INTRAVENOUS ONCE
Status: COMPLETED | OUTPATIENT
Start: 2022-03-31 | End: 2022-03-31

## 2022-03-31 RX ADMIN — GADOBUTROL 7 ML: 604.72 INJECTION INTRAVENOUS at 16:38

## 2022-04-04 DIAGNOSIS — F41.9 ANXIETY: ICD-10-CM

## 2022-04-05 NOTE — TELEPHONE ENCOUNTER
"Routing refill request to provider for review/approval because:  Drug interaction warning    Last Written Prescription Date:  1/4/22  Last Fill Quantity: 90,  # refills: 0   Last office visit provider:  10/12/21     Requested Prescriptions   Pending Prescriptions Disp Refills     PARoxetine (PAXIL) 10 MG tablet [Pharmacy Med Name: PAROXETINE 10MG TABLETS] 90 tablet 0     Sig: TAKE 1 TABLET(10 MG) BY MOUTH EVERY MORNING       SSRIs Protocol Passed - 4/4/2022  3:55 AM        Passed - Recent (12 mo) or future (30 days) visit within the authorizing provider's specialty     Patient has had an office visit with the authorizing provider or a provider within the authorizing providers department within the previous 12 mos or has a future within next 30 days. See \"Patient Info\" tab in inbasket, or \"Choose Columns\" in Meds & Orders section of the refill encounter.              Passed - Medication is active on med list        Passed - Patient is age 18 or older        Passed - No active pregnancy on record        Passed - No positive pregnancy test in last 12 months             Karen Kat RN 04/05/22 10:01 AM  "

## 2022-04-06 RX ORDER — PAROXETINE 10 MG/1
TABLET, FILM COATED ORAL
Qty: 90 TABLET | Refills: 0 | Status: SHIPPED | OUTPATIENT
Start: 2022-04-06 | End: 2022-06-26

## 2022-04-14 ENCOUNTER — PATIENT OUTREACH (OUTPATIENT)
Dept: FAMILY MEDICINE | Facility: CLINIC | Age: 32
End: 2022-04-14
Payer: COMMERCIAL

## 2022-04-14 NOTE — LETTER
April 14, 2022      Ricarda Echavarria  [address] [place] [address]        Dear ,    This letter is to remind you that you are due for your follow-up Pap smear and Human Papillomavirus (HPV) test.    Please call [phone] to schedule your appointment at your earliest convenience.    If you have completed the appointment outside of the [facility] system, please have the records forwarded to our office. We will update your chart for your provider to review before your next annual wellness visit.     Thank you for choosing [facility]!      Sincerely,    Your [facility] Care Team

## 2022-04-26 ENCOUNTER — OFFICE VISIT (OUTPATIENT)
Dept: FAMILY MEDICINE | Facility: CLINIC | Age: 32
End: 2022-04-26
Payer: COMMERCIAL

## 2022-04-26 ENCOUNTER — TRANSFERRED RECORDS (OUTPATIENT)
Dept: HEALTH INFORMATION MANAGEMENT | Facility: CLINIC | Age: 32
End: 2022-04-26

## 2022-04-26 VITALS
WEIGHT: 157 LBS | SYSTOLIC BLOOD PRESSURE: 110 MMHG | RESPIRATION RATE: 12 BRPM | HEART RATE: 52 BPM | DIASTOLIC BLOOD PRESSURE: 72 MMHG | BODY MASS INDEX: 25.15 KG/M2

## 2022-04-26 DIAGNOSIS — I10 ESSENTIAL HYPERTENSION: ICD-10-CM

## 2022-04-26 DIAGNOSIS — Z12.4 CERVICAL CANCER SCREENING: ICD-10-CM

## 2022-04-26 DIAGNOSIS — M54.9 ACUTE BILATERAL BACK PAIN, UNSPECIFIED BACK LOCATION: Primary | ICD-10-CM

## 2022-04-26 DIAGNOSIS — G47.00 INSOMNIA, UNSPECIFIED TYPE: ICD-10-CM

## 2022-04-26 LAB
ALBUMIN UR-MCNC: NEGATIVE MG/DL
APPEARANCE UR: CLEAR
BACTERIA #/AREA URNS HPF: ABNORMAL /HPF
BILIRUB UR QL STRIP: NEGATIVE
COLOR UR AUTO: YELLOW
GLUCOSE UR STRIP-MCNC: NEGATIVE MG/DL
HGB UR QL STRIP: NEGATIVE
KETONES UR STRIP-MCNC: NEGATIVE MG/DL
LEUKOCYTE ESTERASE UR QL STRIP: ABNORMAL
MUCOUS THREADS #/AREA URNS LPF: PRESENT /LPF
NITRATE UR QL: NEGATIVE
PH UR STRIP: 5.5 [PH] (ref 5–8)
RBC #/AREA URNS AUTO: ABNORMAL /HPF
SP GR UR STRIP: 1.01 (ref 1–1.03)
SQUAMOUS #/AREA URNS AUTO: ABNORMAL /LPF
UROBILINOGEN UR STRIP-ACNC: 0.2 E.U./DL
WBC #/AREA URNS AUTO: ABNORMAL /HPF

## 2022-04-26 PROCEDURE — 87624 HPV HI-RISK TYP POOLED RSLT: CPT | Performed by: PHYSICIAN ASSISTANT

## 2022-04-26 PROCEDURE — G0123 SCREEN CERV/VAG THIN LAYER: HCPCS | Performed by: PHYSICIAN ASSISTANT

## 2022-04-26 PROCEDURE — 81001 URINALYSIS AUTO W/SCOPE: CPT | Performed by: PHYSICIAN ASSISTANT

## 2022-04-26 PROCEDURE — 99214 OFFICE O/P EST MOD 30 MIN: CPT | Performed by: PHYSICIAN ASSISTANT

## 2022-04-26 RX ORDER — HYDROXYZINE PAMOATE 50 MG/1
50 CAPSULE ORAL
Qty: 30 CAPSULE | Refills: 0 | Status: SHIPPED | OUTPATIENT
Start: 2022-04-26 | End: 2023-12-28

## 2022-04-26 RX ORDER — DIAZEPAM 2 MG
TABLET ORAL
COMMUNITY
Start: 2022-04-06 | End: 2022-04-26

## 2022-04-26 NOTE — PROGRESS NOTES
"  Subjective:      Ricarda Echavarria is a 32 year old female with chief complaint of 2 concerns:    1.  Follow-up abnormal Pap smear.  History of cervical cancer, with hysterectomy including cervix removal.  Last Pap smear was 5/3/2021 and normal.  They recommended she have a repeat co-test in a year because she had not had 3 normal consecutive Pap smears since her last abnormal.    2.  Trouble sleeping  Chronic problem for her.  She feels like this has been a problem since her sister passed away.  She was prescribed hydroxyzine at one point, but she cannot remember if she took that or not.  She was also prescribed trazodone.  She took this and says it did not help at all.  She notes that she has night terrors and sometimes shakes in her sleep.  She has a counselor that she sees regularly.  I asked her if she has discussed these night terrors and other sleep problems with her counselor.  She says yes, and at the counselor suggested \"maybe I get a better sleep medicine.\"  She is taking all of her other medications as directed.    3.  Possible UTI.  She is having mid and low back pain.  She feels like this happens when she gets a UTI.  No dysuria.  She would like her urine checked today.    Patient Active Problem List   Diagnosis     Mild intermittent asthma without complication     History of thrombocytopenia     Migraine with aura and without status migrainosus, not intractable     Continuous nicotine dependence     Severe major depression (H)     CLAYTON III (cervical intraepithelial neoplasia grade III) with severe dysplasia     Asthma     Cervical cancer screening     Status post MADDISON-BSO        Current Outpatient Medications:      hydrOXYzine (VISTARIL) 50 MG capsule, Take 1 capsule (50 mg) by mouth nightly as needed for itching, Disp: 30 capsule, Rfl: 0     amLODIPine (NORVASC) 10 MG tablet, Take 1 tablet (10 mg) by mouth daily, Disp: 90 tablet, Rfl: 3     diazepam (VALIUM) 2 MG tablet, , Disp: , Rfl:      hydrOXYzine " (VISTARIL) 50 MG capsule, Take 1 capsule (50 mg) by mouth at bedtime as needed, may repeat once for anxiety (poor sleep), Disp: 60 capsule, Rfl: 1     ibuprofen (ADVIL/MOTRIN) 200 MG tablet, Take 200 mg by mouth as needed, Disp: , Rfl:      metroNIDAZOLE (METROGEL) 0.75 % external gel, Insert vaginally once at night for 5 nights, or as directed, Disp: 45 g, Rfl: 1     omeprazole (PRILOSEC) 20 MG DR capsule, , Disp: , Rfl:      PARoxetine (PAXIL) 10 MG tablet, TAKE 1 TABLET(10 MG) BY MOUTH EVERY MORNING, Disp: 90 tablet, Rfl: 0     promethazine (PHENERGAN) 25 MG tablet, Take 1 tablet (25 mg) by mouth every 8 hours as needed for nausea, Disp: 10 tablet, Rfl: 0     propranolol (INDERAL) 20 MG tablet, TAKE 1 TABLET(20 MG) BY MOUTH TWICE DAILY, Disp: 180 tablet, Rfl: 3     triamterene-HCTZ (DYAZIDE) 37.5-25 MG capsule, , Disp: , Rfl:      trimethoprim (TRIMPEX) 100 MG tablet, Take 1 tablet (100 mg) by mouth daily as needed (UTI prevention), Disp: 30 tablet, Rfl: 1     TYLENOL 325 MG OR TABS, unsure of mgs.  prn, per dad, Disp: , Rfl: 0     varenicline (CHANTIX EB) 0.5 MG X 11 & 1 MG X 42 tablet, Take 0.5 mg tab daily for 3 days, THEN 0.5 mg tab twice daily for 4 days, THEN 1 mg twice daily., Disp: 53 tablet, Rfl: 0      Objective:     Allergies:  Zofran [ondansetron]    /72 (BP Location: Left arm, Patient Position: Sitting, Cuff Size: Adult Regular)   Pulse 52   Resp 12   Wt 71.2 kg (157 lb)   LMP  (LMP Unknown)   BMI 25.15 kg/m    Body mass index is 25.15 kg/m .    General: Alert and oriented x 3, in no apparent distress, appropriately groomed with normal affect  Genitourinary: External genitalia is normal in appearance, vaginal walls are healthy, no significant discharge noted, pap of vaginal vault was completed today    Recent Results (from the past 24 hour(s))   UA reflex to Microscopic    Collection Time: 04/26/22 10:57 AM   Result Value Ref Range    Color Urine Yellow Colorless, Straw, Light Yellow,  Yellow    Appearance Urine Clear Clear    Glucose Urine Negative Negative mg/dL    Bilirubin Urine Negative Negative    Ketones Urine Negative Negative mg/dL    Specific Gravity Urine 1.015 1.005 - 1.030    Blood Urine Negative Negative    pH Urine 5.5 5.0 - 8.0    Protein Albumin Urine Negative Negative mg/dL    Urobilinogen Urine 0.2 0.2, 1.0 E.U./dL    Nitrite Urine Negative Negative    Leukocyte Esterase Urine Trace (A) Negative   Urine Microscopic Exam    Collection Time: 04/26/22 10:57 AM   Result Value Ref Range    Bacteria Urine Many (A) None Seen /HPF    RBC Urine None Seen 0-2 /HPF /HPF    WBC Urine 0-5 0-5 /HPF /HPF    Squamous Epithelials Urine Many (A) None Seen /LPF    Mucus Urine Present (A) None Seen /LPF     Other labs pending.    Assessment and Plan:     1. Cervical cancer screening  This was her primary concern today.  She has had hysterectomy including cervix removal, due to history of cervical cancer.  Pap RNs to follow-up with results.  - Pap Screen with HPV - recommended age 30 - 65 years    2. Acute bilateral back pain, unspecified back location  She is concerned she might have a urine infection.  When she has had symptoms like this before, that is what it has been.  I will follow-up with urinalysis and culture.  - UA reflex to Microscopic; Future  - UA reflex to Microscopic  - Urine Microscopic Exam  - Urine Culture; Future    3. Insomnia, unspecified type  This was a lesser concern today.  History of insomnia, been going on a long time, see HPI.  She has tried and failed trazodone.  She was prescribed hydroxyzine in the past but she is not sure if she took that or not.  She does not want to be on a controlled substance or an addicting medicine.  Now she is taking Tylenol PM every night and that is somewhat helpful.  Trial of hydroxyzine.  I reviewed appropriate use with patient.  She is seeing a therapist.    I recommended she review her sleeping issues and strategies for sleep improvement  with her therapist.  Could also consider having her see sleep specialist in the future if needed.    Could also consider trial of prazosin in the future if needed.    Follow-up with PCP in 1 month if no better, sooner if worsening or other concerns.  - hydrOXYzine (VISTARIL) 50 MG capsule; Take 1 capsule (50 mg) by mouth nightly as needed for itching  Dispense: 30 capsule; Refill: 0    4.  Hypertension  Well-controlled on present medications.  Screening labs up-to-date.    This dictation uses voice recognition software, which may contain typographical errors.

## 2022-04-28 LAB
HUMAN PAPILLOMA VIRUS 16 DNA: POSITIVE
HUMAN PAPILLOMA VIRUS 18 DNA: NEGATIVE
HUMAN PAPILLOMA VIRUS FINAL DIAGNOSIS: ABNORMAL
HUMAN PAPILLOMA VIRUS OTHER HR: NEGATIVE

## 2022-04-29 ENCOUNTER — TELEPHONE (OUTPATIENT)
Dept: FAMILY MEDICINE | Facility: CLINIC | Age: 32
End: 2022-04-29
Payer: COMMERCIAL

## 2022-04-29 NOTE — TELEPHONE ENCOUNTER
Reason for Call:  Request for results:    Name of test or procedure: pap smear    Date of test of procedure: 04/26      Location of the test or procedure: [facility]  OK to leave the result message on voice mail or with a family member? YES    Phone number Patient can be reached at:  Home number on file [phone] (home)    Additional comments: pt viewed results in VideoStep she is concerned and would  Like to discuss    Call taken on 4/29/2022 at 8:55 AM by Karina Mix

## 2022-04-29 NOTE — CONFIDENTIAL NOTE
Let the pt know that the pap results are not quite in yet, let her know that the PAP team will call her once those results are in.

## 2022-05-02 LAB
BKR LAB AP GYN ADEQUACY: NORMAL
BKR LAB AP GYN INTERPRETATION: NORMAL
BKR LAB AP GYN OTHER FINDINGS: NORMAL
BKR LAB AP HPV REFLEX: NORMAL
BKR LAB AP PREVIOUS ABNL DX: NORMAL
BKR LAB AP PREVIOUS ABNORMAL: NORMAL
PATH REPORT.COMMENTS IMP SPEC: NORMAL
PATH REPORT.COMMENTS IMP SPEC: NORMAL
PATH REPORT.RELEVANT HX SPEC: NORMAL

## 2022-05-03 NOTE — TELEPHONE ENCOUNTER
Patient called back to request a call from provider's team to go over the results. Please call patient to advise.

## 2022-05-03 NOTE — TELEPHONE ENCOUNTER
Pt is requesting pap results. Please let me know what to relay to pt. I do not see a message to relay.

## 2022-05-04 ENCOUNTER — PATIENT OUTREACH (OUTPATIENT)
Dept: FAMILY MEDICINE | Facility: CLINIC | Age: 32
End: 2022-05-04
Payer: COMMERCIAL

## 2022-05-04 ENCOUNTER — TELEPHONE (OUTPATIENT)
Dept: FAMILY MEDICINE | Facility: CLINIC | Age: 32
End: 2022-05-04
Payer: COMMERCIAL

## 2022-05-04 DIAGNOSIS — Z90.710 STATUS POST TAH-BSO: ICD-10-CM

## 2022-05-04 DIAGNOSIS — Z90.722 STATUS POST TAH-BSO: ICD-10-CM

## 2022-05-04 DIAGNOSIS — Z90.79 STATUS POST TAH-BSO: ICD-10-CM

## 2022-05-04 DIAGNOSIS — D06.9 CIN III (CERVICAL INTRAEPITHELIAL NEOPLASIA GRADE III) WITH SEVERE DYSPLASIA: ICD-10-CM

## 2022-05-04 DIAGNOSIS — R87.619 ABNORMAL CERVICAL PAPANICOLAOU SMEAR, UNSPECIFIED ABNORMAL PAP FINDING: Primary | ICD-10-CM

## 2022-05-04 DIAGNOSIS — D06.9 CIN III (CERVICAL INTRAEPITHELIAL NEOPLASIA GRADE III) WITH SEVERE DYSPLASIA: Primary | ICD-10-CM

## 2022-05-04 DIAGNOSIS — R87.619 ABNORMAL CERVICAL PAPANICOLAOU SMEAR, UNSPECIFIED ABNORMAL PAP FINDING: ICD-10-CM

## 2022-05-04 NOTE — LETTER
May 4, 2022      Ricarda ISRAEL Jericho  [address] [place] [place] [address]        Dear ,    We are writing to inform you of your test results.        Resulted Orders   Pap Screen with HPV - recommended age 30 - 65 years   Result Value Ref Range    Interpretation        Negative for Intraepithelial Lesion or Malignancy (NILM)    Other Findings  Endometrial cells in a woman >=45 years of age; endometrial cells correlate with menstrual history provided, Trichomonas vaginalis, Fungal organisms morphologically consistent with Candida spp, Shift in vaginal yuridia suggestive of bacterial vaginosis,...     Fungal organisms morphologically consistent with Candida spp    Comment         Papanicolaou Test Limitations:  Cervical cytology is a screening test with limited sensitivity, and regular screening is critical for cancer prevention.  Pap tests are primarily effective for the diagnosis/prevention of squamous cell carcinoma, not adenocarcinoma or other cancers.        Specimen Adequacy       Satisfactory for evaluation, endocervical/transformation zone component absent    Clinical Information       complete hysterectomy      Reflex Testing Yes regardless of result     Previous Abnormal?       Yes      Previous Abnormal Diagnosis       see chart      Performing Labs       The technical component of this testing was completed at [facility]     HPV High Risk Types DNA Cervical   Result Value Ref Range    Other HR HPV Negative Negative    HPV16 DNA Positive (A) Negative    HPV18 DNA Negative Negative    FINAL DIAGNOSIS       This patient's sample is positive for HPV 16 DNA.    This test was developed and its performance characteristics determined by the [facility]. It has not been cleared or approved by the FDA. The laboratory is regulated under CLIA as qualified to perform high-complexity testing. This test is used for clinical  purposes. It should not be regarded as investigational or for research.    METHODOLOGY: The Roche Analilia 4800 system uses automated extraction, simultaneous amplification of HPV (L1 region) and beta-globin, followed by real time detection of fluorescent labeled HPV and beta globin using specific oligonucleotide probes. The test specifically identified types HPV 16 DNA and HPV 18 DNA while concurrently detecting the rest of the high risk types (31, 33, 35, 39, 45, 51, 52, 56, 58, 59, 66 or 68).    COMMENTS: This test is not intended for use as a screening device for woman under age 30 with normal cervical cytology. Results should be correlated with cytologic and histologic findings. Close clinical followup is recommended.           If you have any questions or concerns, please call the clinic at the number listed above.       Sincerely,

## 2022-05-04 NOTE — TELEPHONE ENCOUNTER
----- Message from Denice Gallardo PA-C sent at 5/4/2022  2:10 PM CDT -----  Please fax over Pap and HPV test results from 2017, 2021, 2022 to [facility].  She is going to see them for follow-up.  tHanks.  ----- Message -----  From: Yady Nunez RN  Sent: 5/4/2022   9:37 AM CDT  To: Denice Gallardo PA-C    I called the pt and she was advised of the results and recommendation for a Colposcopy. She voices understanding and will comply. I gave her the phone number to have this done at the [facility] with Dr. Simpson but she said that she called them and they are booking out until August now. She inquired if there was any where else she could go? I gave her the phone number for [facility]. Denice can you do a referral for [facility] and have your team fax over the last few pap results and and the Pap problem list to [facility]?

## 2022-05-09 ENCOUNTER — TELEPHONE (OUTPATIENT)
Dept: FAMILY MEDICINE | Facility: CLINIC | Age: 32
End: 2022-05-09
Payer: COMMERCIAL

## 2022-05-09 DIAGNOSIS — N89.8 VAGINAL DISCHARGE: Primary | ICD-10-CM

## 2022-05-09 NOTE — TELEPHONE ENCOUNTER
Reason for Call:  Request for results:    Name of test or procedure: Pap    Date of test of procedure: 04/26/2022    Location of the test or procedure: [facility]    OK to leave the result message on voice mail or with a family member? YES    Phone number Patient can be reached at:  Home number on file [phone] (home)    Additional comments: Patient called, stated that she never got a call with her pap results. And she saw her results via mychart and would like to know if she should be prescribed medication? For the bacteria urine. Please call patient to advise.     Call taken on 5/9/2022 at 12:12 PM by Pina Stevenson

## 2022-05-09 NOTE — TELEPHONE ENCOUNTER
We do not need to do anything about her urine.  They did see some bacteria, but this is probably a contaminant in the sample.  Overall, her urine looks okay.    I am confused about her saying she was not called with her Pap results.  It looks like the Pap RN called the patient on 5/4/2022 and reviewed the results and plan for follow-up.  Please see RN's note at the bottom of her Pap smear results and the lab results.

## 2022-05-10 RX ORDER — FLUCONAZOLE 150 MG/1
150 TABLET ORAL ONCE
Qty: 1 TABLET | Refills: 0 | Status: SHIPPED | OUTPATIENT
Start: 2022-05-10 | End: 2022-05-10

## 2022-05-10 NOTE — ADDENDUM NOTE
Encounter addended by: Joel Benton, PT on: 5/10/2022 9:36 AM   Actions taken: Episode resolved, Clinical Note Signed

## 2022-05-10 NOTE — TELEPHONE ENCOUNTER
RN called patient regarding her question.       Patient stated that she already made her appointment for the colposcopy.       Patient's concern is her vaginal discharge has been going on for a week and has bad smell. Patient would like VALERIA Gallardo, to send prescription to her preferred pharmacy:  [id number] - [place], [place] - [address] [facility] [address]         RN will route this encounter to VALERIA Gallardo, to review and advise.      Aisha Rodriguez RN  [facility]

## 2022-05-10 NOTE — PROGRESS NOTES
[facility]    Outpatient Physical Therapy Discharge Note  Patient: Ricarda Echavarria  : 1990    Beginning/End Dates of Reporting Period:  3/2/22 to 3/2/22    Referring Provider: Denice Hernandez Diagnosis: Dizziness     Client Self Report: See eval    Objective Measurements:  See eval    Outcome Measures (most recent score):  See eval    Goals:  See eval    Plan:  Discharge from therapy.    Discharge:    Reason for Discharge: Patient has failed to schedule further appointments.      Discharge Plan: Patient to continue home program.          Joel Benton, PT, DPT, CLT  5/10/2022

## 2022-05-11 ENCOUNTER — TRANSFERRED RECORDS (OUTPATIENT)
Dept: HEALTH INFORMATION MANAGEMENT | Facility: CLINIC | Age: 32
End: 2022-05-11
Payer: COMMERCIAL

## 2022-06-11 ENCOUNTER — HEALTH MAINTENANCE LETTER (OUTPATIENT)
Age: 32
End: 2022-06-11

## 2022-06-22 ENCOUNTER — PATIENT OUTREACH (OUTPATIENT)
Dept: FAMILY MEDICINE | Facility: CLINIC | Age: 32
End: 2022-06-22

## 2022-06-23 ENCOUNTER — VIRTUAL VISIT (OUTPATIENT)
Dept: FAMILY MEDICINE | Facility: CLINIC | Age: 32
End: 2022-06-23
Payer: COMMERCIAL

## 2022-06-23 DIAGNOSIS — F40.243 FLYING PHOBIA: Primary | ICD-10-CM

## 2022-06-23 PROCEDURE — 99213 OFFICE O/P EST LOW 20 MIN: CPT | Mod: 95 | Performed by: PHYSICIAN ASSISTANT

## 2022-06-23 RX ORDER — DIAZEPAM 2 MG
TABLET ORAL
COMMUNITY
End: 2022-06-23

## 2022-06-23 RX ORDER — LORAZEPAM 0.5 MG/1
TABLET ORAL
Qty: 8 TABLET | Refills: 0 | Status: SHIPPED | OUTPATIENT
Start: 2022-06-23 | End: 2023-12-28

## 2022-06-23 RX ORDER — ESTRADIOL 0.1 MG/G
CREAM VAGINAL
COMMUNITY
End: 2023-12-28

## 2022-06-23 ASSESSMENT — ASTHMA QUESTIONNAIRES
QUESTION_5 LAST FOUR WEEKS HOW WOULD YOU RATE YOUR ASTHMA CONTROL: COMPLETELY CONTROLLED
QUESTION_4 LAST FOUR WEEKS HOW OFTEN HAVE YOU USED YOUR RESCUE INHALER OR NEBULIZER MEDICATION (SUCH AS ALBUTEROL): NOT AT ALL
ACT_TOTALSCORE: 25
ACT_TOTALSCORE: 25
QUESTION_1 LAST FOUR WEEKS HOW MUCH OF THE TIME DID YOUR ASTHMA KEEP YOU FROM GETTING AS MUCH DONE AT WORK, SCHOOL OR AT HOME: NONE OF THE TIME
QUESTION_3 LAST FOUR WEEKS HOW OFTEN DID YOUR ASTHMA SYMPTOMS (WHEEZING, COUGHING, SHORTNESS OF BREATH, CHEST TIGHTNESS OR PAIN) WAKE YOU UP AT NIGHT OR EARLIER THAN USUAL IN THE MORNING: NOT AT ALL
QUESTION_2 LAST FOUR WEEKS HOW OFTEN HAVE YOU HAD SHORTNESS OF BREATH: NOT AT ALL

## 2022-06-23 ASSESSMENT — PATIENT HEALTH QUESTIONNAIRE - PHQ9: SUM OF ALL RESPONSES TO PHQ QUESTIONS 1-9: 0

## 2022-06-23 NOTE — PROGRESS NOTES
"Ricarda is a 32 year old who is being evaluated via a billable telephone visit.      What phone number would you like to be contacted at? [phone]  How would you like to obtain your AVS? [facility]    Assessment & Plan     1. Flying phobia  Continue to take Paxil.  Faer of flying, see HPI.  Trial of lorazepam.  Discussed appropriate use and possible side effects.  Reviewed do not take with alcohol.  Call if problems.  - LORazepam (ATIVAN) 0.5 MG tablet; Take 1 tablet by mouth 30 minutes prior to flight.  Dispense: 8 tablet; Refill: 0      Subjective   Ricarda is a 32 year old accompanied by her self., presenting for the following health issues:  Medication Request (For flying, anxiety and nausea)      HPI     Taking Paroxetine, and BP meds  History of severe major depression.  Taking Paxil daily.  Has a plane ride on the  and she is worried because she gets very anxious and nauseous when she flies.    In-person Appointments all full  Trip to [place], only has had ONE other flight ever, to [place], went terribly, Panic attack worst experience of my life, Throwing up  \"not a good flier\"    Of note:   OB/GYN note Pap trackin2022: Vaginal cuff: No biopsy visually normal plan cotesting 1 year 2023.        Objective           Vitals:  No vitals were obtained today due to virtual visit.    Physical Exam   healthy, alert and no distress  PSYCH: Alert and oriented times 3; coherent speech, normal   rate and volume, able to articulate logical thoughts, able   to abstract reason, no tangential thoughts, no hallucinations   or delusions  Her affect is normal  RESP: No cough, no audible wheezing, able to talk in full sentences  Remainder of exam unable to be completed due to telephone visits            Phone call duration: 10 minutes  1546 - called twice, no answer, unable to leave msg  -- phone number was listed wrong in the telephone note  [phone] spoke with patient and had telephone visit  "

## 2022-06-25 PROBLEM — F40.243 FLYING PHOBIA: Status: ACTIVE | Noted: 2022-06-25

## 2022-09-23 DIAGNOSIS — F41.9 ANXIETY: ICD-10-CM

## 2022-09-25 RX ORDER — PAROXETINE 10 MG/1
TABLET, FILM COATED ORAL
Qty: 90 TABLET | Refills: 2 | Status: SHIPPED | OUTPATIENT
Start: 2022-09-25 | End: 2023-08-07

## 2022-09-25 NOTE — TELEPHONE ENCOUNTER
"Drug interaction warning    Last Written Prescription Date:  6/26/22  Last Fill Quantity: 90,  # refills: 0   Last office visit provider:  6/23/22     Requested Prescriptions   Pending Prescriptions Disp Refills     PARoxetine (PAXIL) 10 MG tablet [Pharmacy Med Name: PAROXETINE 10MG TABLETS] 90 tablet 0     Sig: TAKE 1 TABLET(10 MG) BY MOUTH EVERY MORNING       SSRIs Protocol Passed - 9/23/2022  5:28 PM        Passed - Recent (12 mo) or future (30 days) visit within the authorizing provider's specialty     Patient has had an office visit with the authorizing provider or a provider within the authorizing providers department within the previous 12 mos or has a future within next 30 days. See \"Patient Info\" tab in inbasket, or \"Choose Columns\" in Meds & Orders section of the refill encounter.              Passed - Medication is active on med list        Passed - Patient is age 18 or older        Passed - No active pregnancy on record        Passed - No positive pregnancy test in last 12 months             Jadyn Sanchez RN 09/24/22 7:58 PM  "

## 2022-10-22 ENCOUNTER — HEALTH MAINTENANCE LETTER (OUTPATIENT)
Age: 32
End: 2022-10-22

## 2022-11-03 ENCOUNTER — LAB REQUISITION (OUTPATIENT)
Dept: LAB | Facility: CLINIC | Age: 32
End: 2022-11-03
Payer: COMMERCIAL

## 2022-11-03 DIAGNOSIS — Z01.419 ENCOUNTER FOR GYNECOLOGICAL EXAMINATION (GENERAL) (ROUTINE) WITHOUT ABNORMAL FINDINGS: ICD-10-CM

## 2022-11-03 PROCEDURE — G0145 SCR C/V CYTO,THINLAYER,RESCR: HCPCS | Performed by: OBSTETRICS & GYNECOLOGY

## 2022-11-03 PROCEDURE — 87624 HPV HI-RISK TYP POOLED RSLT: CPT | Performed by: OBSTETRICS & GYNECOLOGY

## 2022-11-07 LAB
BKR LAB AP GYN ADEQUACY: NORMAL
BKR LAB AP GYN INTERPRETATION: NORMAL
BKR LAB AP HPV REFLEX: NORMAL
BKR LAB AP LMP: NORMAL
BKR LAB AP PREVIOUS ABNL DX: NORMAL
BKR LAB AP PREVIOUS ABNORMAL: NORMAL
PATH REPORT.COMMENTS IMP SPEC: NORMAL
PATH REPORT.COMMENTS IMP SPEC: NORMAL
PATH REPORT.RELEVANT HX SPEC: NORMAL

## 2022-11-08 LAB
HUMAN PAPILLOMA VIRUS 16 DNA: NEGATIVE
HUMAN PAPILLOMA VIRUS 18 DNA: NEGATIVE
HUMAN PAPILLOMA VIRUS FINAL DIAGNOSIS: ABNORMAL
HUMAN PAPILLOMA VIRUS OTHER HR: POSITIVE

## 2023-02-21 ENCOUNTER — OFFICE VISIT (OUTPATIENT)
Dept: FAMILY MEDICINE | Facility: CLINIC | Age: 33
End: 2023-02-21
Payer: COMMERCIAL

## 2023-02-21 VITALS
HEART RATE: 72 BPM | SYSTOLIC BLOOD PRESSURE: 126 MMHG | OXYGEN SATURATION: 97 % | DIASTOLIC BLOOD PRESSURE: 85 MMHG | BODY MASS INDEX: 25.74 KG/M2 | TEMPERATURE: 97.5 F | WEIGHT: 164 LBS | HEIGHT: 67 IN | RESPIRATION RATE: 15 BRPM

## 2023-02-21 DIAGNOSIS — G47.9 SLEEP TROUBLE: ICD-10-CM

## 2023-02-21 DIAGNOSIS — F51.4 NIGHT TERRORS: Primary | ICD-10-CM

## 2023-02-21 DIAGNOSIS — R00.2 PALPITATIONS: ICD-10-CM

## 2023-02-21 PROCEDURE — 99214 OFFICE O/P EST MOD 30 MIN: CPT | Performed by: PHYSICIAN ASSISTANT

## 2023-02-21 RX ORDER — PRAZOSIN HYDROCHLORIDE 1 MG/1
1 CAPSULE ORAL AT BEDTIME
Qty: 30 CAPSULE | Refills: 3 | Status: SHIPPED | OUTPATIENT
Start: 2023-02-21 | End: 2023-02-22

## 2023-02-21 ASSESSMENT — ASTHMA QUESTIONNAIRES
ACT_TOTALSCORE: 23
QUESTION_5 LAST FOUR WEEKS HOW WOULD YOU RATE YOUR ASTHMA CONTROL: COMPLETELY CONTROLLED
QUESTION_2 LAST FOUR WEEKS HOW OFTEN HAVE YOU HAD SHORTNESS OF BREATH: ONCE OR TWICE A WEEK
QUESTION_4 LAST FOUR WEEKS HOW OFTEN HAVE YOU USED YOUR RESCUE INHALER OR NEBULIZER MEDICATION (SUCH AS ALBUTEROL): NOT AT ALL
ACT_TOTALSCORE: 23
QUESTION_1 LAST FOUR WEEKS HOW MUCH OF THE TIME DID YOUR ASTHMA KEEP YOU FROM GETTING AS MUCH DONE AT WORK, SCHOOL OR AT HOME: A LITTLE OF THE TIME
QUESTION_3 LAST FOUR WEEKS HOW OFTEN DID YOUR ASTHMA SYMPTOMS (WHEEZING, COUGHING, SHORTNESS OF BREATH, CHEST TIGHTNESS OR PAIN) WAKE YOU UP AT NIGHT OR EARLIER THAN USUAL IN THE MORNING: NOT AT ALL

## 2023-02-21 ASSESSMENT — PATIENT HEALTH QUESTIONNAIRE - PHQ9
10. IF YOU CHECKED OFF ANY PROBLEMS, HOW DIFFICULT HAVE THESE PROBLEMS MADE IT FOR YOU TO DO YOUR WORK, TAKE CARE OF THINGS AT HOME, OR GET ALONG WITH OTHER PEOPLE: SOMEWHAT DIFFICULT
SUM OF ALL RESPONSES TO PHQ QUESTIONS 1-9: 5
SUM OF ALL RESPONSES TO PHQ QUESTIONS 1-9: 5

## 2023-02-21 ASSESSMENT — PAIN SCALES - GENERAL: PAINLEVEL: NO PAIN (0)
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
## 2023-02-22 DIAGNOSIS — F51.4 NIGHT TERRORS: ICD-10-CM

## 2023-02-22 DIAGNOSIS — G47.9 SLEEP TROUBLE: ICD-10-CM

## 2023-02-22 RX ORDER — PRAZOSIN HYDROCHLORIDE 1 MG/1
1 CAPSULE ORAL AT BEDTIME
Qty: 90 CAPSULE | Refills: 0 | Status: SHIPPED | OUTPATIENT
Start: 2023-02-22 | End: 2023-12-28

## 2023-04-11 ENCOUNTER — PATIENT OUTREACH (OUTPATIENT)
Dept: FAMILY MEDICINE | Facility: CLINIC | Age: 33
End: 2023-04-11
Payer: COMMERCIAL

## 2023-04-11 NOTE — TELEPHONE ENCOUNTER
11/3/22 Vag NIL Pap, +HR HPV (not 16 or 18) done at [facility]   [facility] 11/3/[facility].

## 2023-06-18 ENCOUNTER — HEALTH MAINTENANCE LETTER (OUTPATIENT)
Age: 33
End: 2023-06-18

## 2023-07-13 DIAGNOSIS — I10 BENIGN ESSENTIAL HYPERTENSION: ICD-10-CM

## 2023-07-13 RX ORDER — AMLODIPINE BESYLATE 10 MG/1
10 TABLET ORAL DAILY
Qty: 90 TABLET | Refills: 0 | Status: SHIPPED | OUTPATIENT
Start: 2023-07-13 | End: 2023-08-22

## 2023-07-13 NOTE — TELEPHONE ENCOUNTER
"Routing refill request to provider for review/approval because:  Labs not current:  CR    Last Written Prescription Date:  3/28/23  Last Fill Quantity: 90,  # refills: 0   Last office visit provider:   2/21/23    Requested Prescriptions   Pending Prescriptions Disp Refills    amLODIPine (NORVASC) 10 MG tablet 90 tablet 0     Sig: Take 1 tablet (10 mg) by mouth daily       Calcium Channel Blockers Protocol  Failed - 7/13/2023  8:10 AM        Failed - Normal serum creatinine on file in past 12 months     Recent Labs   Lab Test 09/07/21  1742   CR 0.76       Ok to refill medication if creatinine is low          Passed - Blood pressure under 140/90 in past 12 months     BP Readings from Last 3 Encounters:   02/21/23 126/85   04/26/22 110/72   02/02/22 136/82                 Passed - Recent (12 mo) or future (30 days) visit within the authorizing provider's specialty     Patient has had an office visit with the authorizing provider or a provider within the authorizing providers department within the previous 12 mos or has a future within next 30 days. See \"Patient Info\" tab in inbasket, or \"Choose Columns\" in Meds & Orders section of the refill encounter.              Passed - Medication is active on med list        Passed - Patient is age 18 or older        Passed - No active pregnancy on record        Passed - No positive pregnancy test in past 12 months             Argelia Friedman RN 07/13/23 2:44 PM  "

## 2023-08-01 NOTE — TELEPHONE ENCOUNTER
08/1/23 called [facility] to re-request office visit note and recommendations from 11/3/22 visit.

## 2023-08-06 DIAGNOSIS — F41.9 ANXIETY: ICD-10-CM

## 2023-08-06 NOTE — TELEPHONE ENCOUNTER
"Routing refill request to provider for review/approval because:  A break in medication    Last Written Prescription Date:  9/25/2022  Last Fill Quantity: 90,  # refills: 2   Last office visit provider:  2/21/2023     Requested Prescriptions   Pending Prescriptions Disp Refills    PARoxetine (PAXIL) 10 MG tablet [Pharmacy Med Name: PAROXETINE 10MG TABLETS] 90 tablet 2     Sig: TAKE 1 TABLET(10 MG) BY MOUTH EVERY MORNING       SSRIs Protocol Passed - 8/6/2023  3:55 AM        Passed - Recent (12 mo) or future (30 days) visit within the authorizing provider's specialty     Patient has had an office visit with the authorizing provider or a provider within the authorizing providers department within the previous 12 mos or has a future within next 30 days. See \"Patient Info\" tab in inbasket, or \"Choose Columns\" in Meds & Orders section of the refill encounter.              Passed - Medication is active on med list        Passed - Patient is age 18 or older        Passed - No active pregnancy on record        Passed - No positive pregnancy test in last 12 months             Sugey Wagoner RN 08/06/23 6:01 PM  "

## 2023-08-07 RX ORDER — PAROXETINE 10 MG/1
TABLET, FILM COATED ORAL
Qty: 90 TABLET | Refills: 2 | Status: SHIPPED | OUTPATIENT
Start: 2023-08-07 | End: 2024-06-12

## 2023-08-21 DIAGNOSIS — I10 BENIGN ESSENTIAL HYPERTENSION: ICD-10-CM

## 2023-08-22 RX ORDER — AMLODIPINE BESYLATE 10 MG/1
10 TABLET ORAL DAILY
Qty: 90 TABLET | Refills: 0 | Status: SHIPPED | OUTPATIENT
Start: 2023-08-22 | End: 2023-11-27

## 2023-08-22 NOTE — TELEPHONE ENCOUNTER
"Routing refill request to provider for review/approval because:  -Labs not current:  CR  -Early refill    Last Written Prescription Date:  07/13/2023  Last Fill Quantity: 90,  # refills: 0   Last office visit provider:  02/21/2023     Requested Prescriptions   Pending Prescriptions Disp Refills    amLODIPine (NORVASC) 10 MG tablet [Pharmacy Med Name: AMLODIPINE BESYLATE 10MG TABLETS] 90 tablet 0     Sig: TAKE 1 TABLET(10 MG) BY MOUTH DAILY       Calcium Channel Blockers Protocol  Failed - 8/22/2023 11:37 AM        Failed - Normal serum creatinine on file in past 12 months     Recent Labs   Lab Test 09/07/21  1742   CR 0.76       Ok to refill medication if creatinine is low          Passed - Blood pressure under 140/90 in past 12 months     BP Readings from Last 3 Encounters:   02/21/23 126/85   04/26/22 110/72   02/02/22 136/82                 Passed - Recent (12 mo) or future (30 days) visit within the authorizing provider's specialty     Patient has had an office visit with the authorizing provider or a provider within the authorizing providers department within the previous 12 mos or has a future within next 30 days. See \"Patient Info\" tab in inbasket, or \"Choose Columns\" in Meds & Orders section of the refill encounter.              Passed - Medication is active on med list        Passed - Patient is age 18 or older        Passed - No active pregnancy on record        Passed - No positive pregnancy test in past 12 months             Katelin Carrera RN 08/22/23 11:38 AM  "

## 2023-09-21 NOTE — TELEPHONE ENCOUNTER
09/21/23 called [email or web address] to re-request office visit note and recommendations from 11/3/22 visit.

## 2023-10-23 ENCOUNTER — PATIENT OUTREACH (OUTPATIENT)
Dept: FAMILY MEDICINE | Facility: CLINIC | Age: 33
End: 2023-10-23
Payer: COMMERCIAL

## 2023-10-23 DIAGNOSIS — D06.9 CIN III (CERVICAL INTRAEPITHELIAL NEOPLASIA GRADE III) WITH SEVERE DYSPLASIA: Primary | ICD-10-CM

## 2023-11-27 DIAGNOSIS — I10 BENIGN ESSENTIAL HYPERTENSION: ICD-10-CM

## 2023-11-27 NOTE — TELEPHONE ENCOUNTER
Medication Question or Refill        What medication are you calling about (include dose and sig)?:     Disp Refills Start End MARY KAY   amLODIPine (NORVASC) 10 MG tablet 90 tablet 0         Preferred Pharmacy:     [id number] - [place], [place] - [address] [place] [address] AT [address]  [address] [place] [address]  Phone: [phone] Fax: [phone]      Controlled Substance Agreement on file:   CSA -- Patient Level:    CSA: None found at the patient level.       Who prescribed the medication?:      Do you need a refill? Yes    When did you use the medication last? Per pt, she is completely out of med and in need of med ASAP.     Patient offered an appointment? No    Do you have any questions or concerns?  Yes: Pt is concerned that she'll end up in hospital if she doesn't get med fill ASAP.       Could we send this information to you in [facility] or would you prefer to receive a phone call?:   Patient would prefer a phone call   Okay to leave a detailed message?: Yes at Home number on file [phone] (home)

## 2023-11-28 RX ORDER — AMLODIPINE BESYLATE 10 MG/1
10 TABLET ORAL DAILY
Qty: 90 TABLET | Refills: 0 | Status: SHIPPED | OUTPATIENT
Start: 2023-11-28 | End: 2024-02-26

## 2023-12-28 ENCOUNTER — OFFICE VISIT (OUTPATIENT)
Dept: FAMILY MEDICINE | Facility: CLINIC | Age: 33
End: 2023-12-28
Payer: COMMERCIAL

## 2023-12-28 VITALS
OXYGEN SATURATION: 98 % | WEIGHT: 158 LBS | HEIGHT: 66 IN | SYSTOLIC BLOOD PRESSURE: 121 MMHG | HEART RATE: 62 BPM | TEMPERATURE: 97.4 F | DIASTOLIC BLOOD PRESSURE: 80 MMHG | BODY MASS INDEX: 25.39 KG/M2 | RESPIRATION RATE: 16 BRPM

## 2023-12-28 DIAGNOSIS — Z12.4 CERVICAL CANCER SCREENING: Primary | ICD-10-CM

## 2023-12-28 DIAGNOSIS — R00.2 PALPITATIONS: ICD-10-CM

## 2023-12-28 DIAGNOSIS — G47.9 SLEEP TROUBLE: ICD-10-CM

## 2023-12-28 DIAGNOSIS — D06.9 CIN III (CERVICAL INTRAEPITHELIAL NEOPLASIA GRADE III) WITH SEVERE DYSPLASIA: ICD-10-CM

## 2023-12-28 DIAGNOSIS — F51.4 NIGHT TERRORS: ICD-10-CM

## 2023-12-28 PROCEDURE — 87624 HPV HI-RISK TYP POOLED RSLT: CPT | Performed by: FAMILY MEDICINE

## 2023-12-28 PROCEDURE — 99214 OFFICE O/P EST MOD 30 MIN: CPT | Performed by: FAMILY MEDICINE

## 2023-12-28 PROCEDURE — G0123 SCREEN CERV/VAG THIN LAYER: HCPCS | Performed by: FAMILY MEDICINE

## 2023-12-28 RX ORDER — PROPRANOLOL HYDROCHLORIDE 20 MG/1
20 TABLET ORAL 2 TIMES DAILY
Qty: 180 TABLET | Refills: 0 | Status: SHIPPED | OUTPATIENT
Start: 2023-12-28 | End: 2024-06-12

## 2023-12-28 RX ORDER — PRAZOSIN HYDROCHLORIDE 1 MG/1
1 CAPSULE ORAL AT BEDTIME
Qty: 90 CAPSULE | Refills: 0 | Status: SHIPPED | OUTPATIENT
Start: 2023-12-28 | End: 2024-06-12

## 2023-12-28 ASSESSMENT — PATIENT HEALTH QUESTIONNAIRE - PHQ9
10. IF YOU CHECKED OFF ANY PROBLEMS, HOW DIFFICULT HAVE THESE PROBLEMS MADE IT FOR YOU TO DO YOUR WORK, TAKE CARE OF THINGS AT HOME, OR GET ALONG WITH OTHER PEOPLE: NOT DIFFICULT AT ALL
SUM OF ALL RESPONSES TO PHQ QUESTIONS 1-9: 9
SUM OF ALL RESPONSES TO PHQ QUESTIONS 1-9: 9

## 2023-12-28 ASSESSMENT — ASTHMA QUESTIONNAIRES: ACT_TOTALSCORE: 25
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
## 2024-01-03 LAB
BKR LAB AP GYN ADEQUACY: NORMAL
BKR LAB AP GYN INTERPRETATION: NORMAL
BKR LAB AP HPV REFLEX: NORMAL
BKR LAB AP PREVIOUS ABNL DX: NORMAL
BKR LAB AP PREVIOUS ABNORMAL: NORMAL
PATH REPORT.COMMENTS IMP SPEC: NORMAL
PATH REPORT.COMMENTS IMP SPEC: NORMAL
PATH REPORT.RELEVANT HX SPEC: NORMAL

## 2024-01-03 NOTE — COMMUNITY RESOURCES LIST (ENGLISH)
01/03/2024   [facility]  N/A  For questions about this resource list or additional care needs, please contact your primary care clinic or care manager.  Phone: [phone]   Email: N/A   Address: [address] [place] [id number]   Hours: N/A        Financial Stability       Rent and mortgage payment assistance  1  [facility] Distance: 1.08 miles      Phone/Virtual   [address] [place] [address]  Language: English  Hours: Mon - Fri 9:00 AM - 4:00 PM  Fees: Free   Phone: (952) 659-5786 Email: eder@[facility]8villages.[employer] ([employer]) Website: http://[employer].org/[facility]/ix-skys-riuboAustinave/     2  ong [employer] ([facility]) - [place] Office - Supportive Housing Assistance Program (SHAP) - Rent and mortgage payment assistance Distance: 1.44 miles      Phone/Virtual   [address] [place] [address]  Language: English, Hmong, Alena, Kuwaiti  Hours: Mon - Fri 8:30 AM - 5:00 PM  Fees: Free   Phone: (303) 746-8902 Email: abeba@[employer] (SDI) Website: http://www.[employer].org/[facility]-impact-areas/     Utility payment assistance  3  [facility] Distance: 1.08 miles      Phone/Virtual   [address] [place] [address]  Language: English  Hours: Mon - Fri 9:00 AM - 4:00 PM  Fees: Free   Phone: (318) 994-4640 Email: eder@[facility] Center[facility]agex.[employer] (SDI) Website: http://[employer].org/[facility]/et-prsp-ncxyatarik/     4  ong [employer] ([facility]) - [place] Office - Supportive Housing Assistance Program (SHAP) - Utility payment assistance Distance: 1.44 miles      Phone/Virtual   [address] [place] [address]  Language: English, Hmong, Alena, Kuwaiti  Hours: Mon - Fri 8:30 AM - 5:00 PM  Fees: Free   Phone: (565) 789-8614 Email: abeba@hmong.org Website: http://www.hmong.org/hap-impact-areas/          Food and Nutrition       Food pantry  [address] [place] [address]  Service - [facility] Food Shelf Distance: 0.89 miles      In-Person   [address] [facility] [address] [place] [address]  Language: English  Hours: Mon - Fri 10:00 AM - 12:30 PM , Mon - Tue 1:30 PM - 3:30 PM , Thu - Fri 1:30 PM - 3:30 PM  Fees: Free   Phone: (816) 982-3784 Email: info@[employer] Website: http://[employer]/food-shelves/     6  [facility] Distance: 1.08 miles      [facility]   [address] [place] [address]  Language: English  Hours: Mon - Tue 9:00 AM - 3:00 PM  Fees: Free, Self Pay   Phone: (842) 729-6364 Email: eder@[facility] Website: http://[facility] ChildrenInnovari[facility].org/[facility]/wn-txfn-pemax-ave/     SNAP application assistance  7  [facility] Distance: 1.08 miles      Phone/Virtual   [address] [place] [address]  Language: English  Hours: Mon - Thu 9:00 AM - 4:00 PM , Fri 9:00 AM - 2:00 PM , Sun 10:00 AM - 12:00 PM  Fees: Free   Phone: (209) 470-8348 Email: eder@[facility] Website: http://[facility] ChildrenInnovariEx[facility].org/[facility]/     8  [facility] Distance: 1.19 miles      Phone/Virtual   [address] [place] [address] [address] [place] [address]  Language: English, Hmong, Maltese, Iranian, Nepalese  Hours: Mon - Fri 8:30 AM - 4:30 PM  Fees: Free   Phone: (345) 988-2495 Email: helpline@[employer].org Website: https://www.hungersolutions.org/programs/[place]-food-helpline/     Soup kitchen or free meals  9  [facility] Distance: 1[place]08 [place] [address]  Language: English  Hours: Mon - Fri 11:45 AM - 12:45 PM  Fees: Free   Phone: (466) 795-3957 Email: eder@[facility] HospitalVedantra Pharmaceuticals.org Website: http://[facility].org/community/[facility]/     10  [facility] [place] [facility] Distance: 1.12 miles      [place] [id number]   Language: English  Hours: Wed 2:00 PM - 4:00 PM , Fri 2:00 PM - 4:00 PM  Fees: Free   Phone: (398) 792-3368 Email: Raciel@.\[facility]\"". Website: https://www.Lists of hospitals in the [place].[facility]/facilities/[facility]          Hotlines and Helplines       Hotline - Housing crisis  11  [facility] Distance: 8.2 miles      Phone/Virtual   [address] [place] [address]  Language: English  Hours: Mon - Sun Open 24 Hours   Phone: (522) 584-6040 Email: communications@\[facility]\""-mn.org Website: https://oscs-mn.org/oursaviourshousing/     12  [facility] Distance: 9.8 miles      Phone/Virtual   [address] [place] [address]  Language: English  Hours: Mon - Sun Open 24 Hours   Phone: (585) 291-3347 Email: info@[facility].[employer] Website: http://www.[facility].org          Housing       Coordinated Entry access point  13  [facility] Distance: 1.92 miles      In-Person, Phone/Virtual   [address] [place] [address]  Language: English  Hours: Mon - Fri 8:30 AM - 4:30 PM  Fees: Free   Phone: (744) 220-6053 Email: info@[facility].org Website: https://www.[facility].org/locations/[facility]/     14  [facility] - Coordinated Access to Housing and Shelter (CAHS) - Coordinated Access - Coordinated Entry access point Distance: 3.13 miles      In-Person, Phone/Virtual   [address] [place] [address]  Language: English  Hours: Mon - Fri 8:00 AM - 4:30 PM  Fees: Free   Phone: (573) 700-7951 Website: https://www.[facility]./residents/assistance-support/assistance/housing-services-support     Drop-in center or day shelter  15  [facility] Distance: 1.48 miles      In-Person   [address] [place] [address]  Language: English  Hours: Mon - Fri 9:00 AM - 4:00 PM  Fees: Free   Phone: (744) 166-4603 Email: isaac@[employer].org Website: http://[employer].org     [address] [place] [address]  [facility] and [place] [facility] Distance: 8.19 miles      In-Person   [address] [place] [address]  Language: English, Nauruan, Indonesian  Hours: Mon - Sat 7:00 AM - 3:00 PM  Fees: Free, Self Pay   Phone: (410) 435-2542 Email: info@[facility] Website: https://www.Compliance Control.Finalta/locations/opportunity-center/     Housing search assistance  17  [facility] - Housing Search Assistance Distance: 3.15 miles      Phone/Virtual   [address] [place] [address]  Language: Italian, English, Hmong, Alena, Nauruan, Indonesian  Hours: Mon - Fri Appt. Only  Fees: Free   Phone: (342) 339-3882 Website: https://[facility].org/     18  [facility] [place] - Mental Health Crisis Housing Search Assistance Distance: 4.31 miles      In-Person, Phone/Virtual   [address] [place] [address] [place] [address]  Language: English  Hours: Mon - Tue 8:00 AM - 4:30 PM , Wed 8:00 AM - 6:00 PM , Thu - Fri 8:00 AM - 4:30 PM  Fees: Free   Phone: (978) 516-1418 Email: [facility]. Website: https://www.[facility]./residents/health-medical/clinics-services/mental-health/adult-mental-health     Shelter for families  19  [facility] Distance: 13.61 miles      In-Person   [address] [place] [address]  Language: English  Hours: Mon - Fri 3:00 PM - 9:00 AM , Sat - Sun Open 24 Hours  Fees: Free   Phone: (330) 140-8328 Ext.1 Website: https://www.saintandrews.org/2020/07/03/emergency-family-shelter/     Shelter for individuals  20  [facility] and [place] - [facility] [place] [facility] - [facility] [place] [facility] Distance: 1.91 miles      In-Person   [address] [place] [address]  Language: English  Hours: Mon - Sun 5:00 PM - 10:00 AM  Fees: Free, Self Pay   Phone: (851) 594-7504 Email: info@[employer] Website:  https://www.cctwincities.org/locations/[facility]-[place]/     21  [facility] - Coordinated Access to Housing and Shelter (CAHS) - Coordinated Access - Emergency housing Distance: 3.13 miles      In-Person, Phone/Virtual   [address] [place] [address]  Language: English  Hours: Mon - Fri 8:00 AM - 4:30 PM  Fees: Free   Phone: (898) 640-5615 Website: https://www.[facility]./residents/assistance-support/assistance/housing-services-support          Important Numbers & Websites       Emergency Services   911  [facility]   311  Poison Control   (802) 575-6545  Suicide Prevention Lifeline   (195) 270-2584 (TALK)  Child Abuse Hotline   (585) 584-1105 (4-A-Child)  Sexual Assault Hotline   (697) 647-9423 (HOPE)  National Runaway Safeline   (483) 650-6412 (RUNAWAY)  All-Options Talkline   (783) 304-8636  Substance Abuse Referral   (882) 611-2806 (HELP)

## 2024-01-05 LAB
HUMAN PAPILLOMA VIRUS 16 DNA: NEGATIVE
HUMAN PAPILLOMA VIRUS 18 DNA: NEGATIVE
HUMAN PAPILLOMA VIRUS FINAL DIAGNOSIS: NORMAL
HUMAN PAPILLOMA VIRUS OTHER HR: NEGATIVE

## 2024-01-08 ENCOUNTER — PATIENT OUTREACH (OUTPATIENT)
Dept: FAMILY MEDICINE | Facility: CLINIC | Age: 34
End: 2024-01-08
Payer: COMMERCIAL

## 2024-02-26 DIAGNOSIS — I10 BENIGN ESSENTIAL HYPERTENSION: ICD-10-CM

## 2024-02-26 NOTE — TELEPHONE ENCOUNTER
Medication Question or Refill        What medication are you calling about (include dose and sig)?:   amLODIPine (NORVASC) 10 MG tablet     Preferred Pharmacy:     [id number] [place], [place] - [address] [place] [address] AT [facility] & [address]  [address] [place] [address]  Phone: [phone] Fax: [phone]    Controlled Substance Agreement on file:   CSA -- Patient Level:    CSA: None found at the patient level.       Who prescribed the medication?: PCP    Do you need a refill? Yes    When did you use the medication last? NA    Patient offered an appointment? No    Do you have any questions or concerns?  No

## 2024-02-27 RX ORDER — AMLODIPINE BESYLATE 10 MG/1
10 TABLET ORAL DAILY
Qty: 90 TABLET | Refills: 0 | Status: SHIPPED | OUTPATIENT
Start: 2024-02-27 | End: 2024-05-29

## 2024-05-29 DIAGNOSIS — I10 BENIGN ESSENTIAL HYPERTENSION: ICD-10-CM

## 2024-05-29 RX ORDER — AMLODIPINE BESYLATE 10 MG/1
10 TABLET ORAL DAILY
Qty: 90 TABLET | Refills: 0 | Status: SHIPPED | OUTPATIENT
Start: 2024-05-29 | End: 2024-06-12

## 2024-06-11 ENCOUNTER — TELEPHONE (OUTPATIENT)
Dept: FAMILY MEDICINE | Facility: CLINIC | Age: 34
End: 2024-06-11
Payer: COMMERCIAL

## 2024-06-12 ENCOUNTER — VIRTUAL VISIT (OUTPATIENT)
Dept: FAMILY MEDICINE | Facility: CLINIC | Age: 34
End: 2024-06-12
Payer: COMMERCIAL

## 2024-06-12 DIAGNOSIS — F41.9 ANXIETY: ICD-10-CM

## 2024-06-12 DIAGNOSIS — F51.4 NIGHT TERRORS: ICD-10-CM

## 2024-06-12 DIAGNOSIS — I10 BENIGN ESSENTIAL HYPERTENSION: ICD-10-CM

## 2024-06-12 DIAGNOSIS — R00.2 PALPITATIONS: ICD-10-CM

## 2024-06-12 DIAGNOSIS — G47.9 SLEEP TROUBLE: ICD-10-CM

## 2024-06-12 PROCEDURE — 99214 OFFICE O/P EST MOD 30 MIN: CPT | Mod: 95 | Performed by: PHYSICIAN ASSISTANT

## 2024-06-12 RX ORDER — PROPRANOLOL HYDROCHLORIDE 20 MG/1
20 TABLET ORAL 2 TIMES DAILY
Qty: 180 TABLET | Refills: 0 | Status: SHIPPED | OUTPATIENT
Start: 2024-06-12 | End: 2024-09-23

## 2024-06-12 RX ORDER — PAROXETINE 10 MG/1
10 TABLET, FILM COATED ORAL EVERY MORNING
Qty: 90 TABLET | Refills: 2 | Status: SHIPPED | OUTPATIENT
Start: 2024-06-12

## 2024-06-12 RX ORDER — IBUPROFEN 200 MG
200 TABLET ORAL PRN
Status: CANCELLED | OUTPATIENT
Start: 2024-06-12

## 2024-06-12 RX ORDER — PRAZOSIN HYDROCHLORIDE 1 MG/1
1 CAPSULE ORAL AT BEDTIME
Qty: 90 CAPSULE | Refills: 0 | Status: SHIPPED | OUTPATIENT
Start: 2024-06-12 | End: 2024-07-19

## 2024-06-12 RX ORDER — AMLODIPINE BESYLATE 10 MG/1
10 TABLET ORAL DAILY
Qty: 90 TABLET | Refills: 0 | Status: SHIPPED | OUTPATIENT
Start: 2024-06-12 | End: 2024-09-08

## 2024-06-12 ASSESSMENT — PATIENT HEALTH QUESTIONNAIRE - PHQ9
SUM OF ALL RESPONSES TO PHQ QUESTIONS 1-9: 3
SUM OF ALL RESPONSES TO PHQ QUESTIONS 1-9: 3
10. IF YOU CHECKED OFF ANY PROBLEMS, HOW DIFFICULT HAVE THESE PROBLEMS MADE IT FOR YOU TO DO YOUR WORK, TAKE CARE OF THINGS AT HOME, OR GET ALONG WITH OTHER PEOPLE: NOT DIFFICULT AT ALL

## 2024-06-12 NOTE — PROGRESS NOTES
"Ricarda is a 34 year old who is being evaluated via a billable video visit.    How would you like to obtain your AVS? MyChart  If the video visit is dropped, the invitation should be resent by: Text to cell phone: [phone]  Will anyone else be joining your video visit? No      Assessment & Plan     Benign essential hypertension  -refills sent  -plan to follow-up in clinic in 3 months  - amLODIPine (NORVASC) 10 MG tablet; Take 1 tablet (10 mg) by mouth daily    Palpitations  -stable, refills sent  - propranolol (INDERAL) 20 MG tablet; Take 1 tablet (20 mg) by mouth 2 times daily    Night terrors  -works well, refills sent  - prazosin (MINIPRESS) 1 MG capsule; Take 1 capsule (1 mg) by mouth at bedtime    Sleep trouble  - prazosin (MINIPRESS) 1 MG capsule; Take 1 capsule (1 mg) by mouth at bedtime    Anxiety  -would like to eventually wean off of Paxil but not now  -refills sent  - PARoxetine (PAXIL) 10 MG tablet; Take 1 tablet (10 mg) by mouth every morning      BMI  Estimated body mass index is 25.5 kg/m  as calculated from the following:    Height as of 12/28/23: 1.676 m (5' 6\").    Weight as of 12/28/23: 71.7 kg (158 lb).         Subjective   Ricarda is a 34 year old, presenting for the following health issues:  Recheck Medication      6/12/2024     2:41 PM   Additional Questions   Roomed by hser   Accompanied by self     -hx of HTN, managed well with meds  -needs refills  -Patient denies any exertional chest pain, dyspnea, palpitations, syncope, orthopnea, edema or paroxysmal nocturnal dyspnea.   -had labs done in Feb in the ED    -h/o paresthesias in the hands and feet  -no weakness    -complains of mild ptosis of the right eye  -no vision changes  -no facial weakness or drooping    History of Present Illness       Reason for visit:  Meds refill    She eats 2-3 servings of fruits and vegetables daily.She consumes 1 sweetened beverage(s) daily.She exercises with enough effort to increase her heart rate 20 to 29 " minutes per day.  She exercises with enough effort to increase her heart rate 3 or less days per week.   She is taking medications regularly.           Objective    Vitals - Patient Reported  Systolic (Patient Reported):  (unable to)  Diastolic (Patient Reported):  (unable to)  Weight (Patient Reported):  (unable to)  Height (Patient Reported):  (unable to)  SpO2 (Patient Reported):  (unable to)  Temperature (Patient Reported):  (unable to)  Pulse (Patient Reported):  (unable to)  Pain Score: No Pain (0)  Pain Loc: Other - see comment        Physical Exam   GENERAL: alert and no distress  EYES: Eyes grossly normal to inspection.  No discharge or erythema, or obvious scleral/conjunctival abnormalities.  RESP: No audible wheeze, cough, or visible cyanosis.    SKIN: Visible skin clear. No significant rash, abnormal pigmentation or lesions.  NEURO: Cranial nerves grossly intact.  Mentation and speech appropriate for age.  PSYCH: Appropriate affect, tone, and pace of words        Video-Visit Details    Type of service:  Video Visit   Originating Location (pt. Location): Home    Distant Location (provider location):  On-site  Platform used for Video Visit: Benito  Signed Electronically by: Anila Saleem PA-C        Prior to immunization administration, verified patients identity using patient s name and date of birth. Please see Immunization Activity for additional information.     Screening Questionnaire for Adult Immunization    Are you sick today?   No   Do you have allergies to medications, food, a vaccine component or latex?   Yes   Have you ever had a serious reaction after receiving a vaccination?   No   Do you have a long-term health problem with heart, lung, kidney, or metabolic disease (e.g., diabetes), asthma, a blood disorder, no spleen, complement component deficiency, a cochlear implant, or a spinal fluid leak?  Are you on long-term aspirin therapy?   No   Do you have cancer, leukemia, HIV/AIDS, or  any other immune system problem?   No   Do you have a parent, brother, or sister with an immune system problem?   No   In the past 3 months, have you taken medications that affect  your immune system, such as prednisone, other steroids, or anticancer drugs; drugs for the treatment of rheumatoid arthritis, Crohn s disease, or psoriasis; or have you had radiation treatments?   No   Have you had a seizure, or a brain or other nervous system problem?   No   During the past year, have you received a transfusion of blood or blood    products, or been given immune (gamma) globulin or antiviral drug?   No   For women: Are you pregnant or is there a chance you could become       pregnant during the next month?   No   Have you received any vaccinations in the past 4 weeks?   No     Immunization questionnaire was positive for at least one answer.  Notified provider.      Patient instructed to remain in clinic for 15 minutes afterwards, and to report any adverse reactions.     Screening performed by Brian Eckert MA on 6/12/2024 at 2:46 PM.

## 2024-07-19 ENCOUNTER — OFFICE VISIT (OUTPATIENT)
Dept: FAMILY MEDICINE | Facility: CLINIC | Age: 34
End: 2024-07-19
Payer: COMMERCIAL

## 2024-07-19 VITALS
WEIGHT: 165 LBS | BODY MASS INDEX: 26.52 KG/M2 | HEIGHT: 66 IN | TEMPERATURE: 97.9 F | OXYGEN SATURATION: 99 % | RESPIRATION RATE: 16 BRPM

## 2024-07-19 DIAGNOSIS — R14.3 FLATULENCE, ERUCTATION AND GAS PAIN: Primary | ICD-10-CM

## 2024-07-19 DIAGNOSIS — R10.84 GENERALIZED ABDOMINAL PAIN: ICD-10-CM

## 2024-07-19 DIAGNOSIS — G47.9 SLEEP TROUBLE: ICD-10-CM

## 2024-07-19 DIAGNOSIS — R14.1 FLATULENCE, ERUCTATION AND GAS PAIN: Primary | ICD-10-CM

## 2024-07-19 DIAGNOSIS — R14.2 FLATULENCE, ERUCTATION AND GAS PAIN: Primary | ICD-10-CM

## 2024-07-19 DIAGNOSIS — R07.9 CHEST PAIN, UNSPECIFIED TYPE: ICD-10-CM

## 2024-07-19 DIAGNOSIS — F32.2 SEVERE MAJOR DEPRESSION (H): ICD-10-CM

## 2024-07-19 LAB
ALBUMIN SERPL BCG-MCNC: 4.4 G/DL (ref 3.5–5.2)
ALP SERPL-CCNC: 90 U/L (ref 40–150)
ALT SERPL W P-5'-P-CCNC: 43 U/L (ref 0–50)
ANION GAP SERPL CALCULATED.3IONS-SCNC: 9 MMOL/L (ref 7–15)
AST SERPL W P-5'-P-CCNC: 31 U/L (ref 0–45)
BILIRUB SERPL-MCNC: 0.2 MG/DL
BUN SERPL-MCNC: 14.2 MG/DL (ref 6–20)
CALCIUM SERPL-MCNC: 9.1 MG/DL (ref 8.8–10.4)
CHLORIDE SERPL-SCNC: 105 MMOL/L (ref 98–107)
CREAT SERPL-MCNC: 0.83 MG/DL (ref 0.51–0.95)
EGFRCR SERPLBLD CKD-EPI 2021: >90 ML/MIN/1.73M2
ERYTHROCYTE [DISTWIDTH] IN BLOOD BY AUTOMATED COUNT: 11.7 % (ref 10–15)
GLUCOSE SERPL-MCNC: 101 MG/DL (ref 70–99)
HCO3 SERPL-SCNC: 25 MMOL/L (ref 22–29)
HCT VFR BLD AUTO: 37.6 % (ref 35–47)
HGB BLD-MCNC: 12.5 G/DL (ref 11.7–15.7)
LIPASE SERPL-CCNC: 27 U/L (ref 13–60)
MCH RBC QN AUTO: 28.9 PG (ref 26.5–33)
MCHC RBC AUTO-ENTMCNC: 33.2 G/DL (ref 31.5–36.5)
MCV RBC AUTO: 87 FL (ref 78–100)
PLATELET # BLD AUTO: 295 10E3/UL (ref 150–450)
POTASSIUM SERPL-SCNC: 4.3 MMOL/L (ref 3.4–5.3)
PROT SERPL-MCNC: 7.2 G/DL (ref 6.4–8.3)
RBC # BLD AUTO: 4.32 10E6/UL (ref 3.8–5.2)
SODIUM SERPL-SCNC: 139 MMOL/L (ref 135–145)
WBC # BLD AUTO: 6.8 10E3/UL (ref 4–11)

## 2024-07-19 PROCEDURE — 99215 OFFICE O/P EST HI 40 MIN: CPT | Performed by: PHYSICIAN ASSISTANT

## 2024-07-19 PROCEDURE — 36415 COLL VENOUS BLD VENIPUNCTURE: CPT | Performed by: PHYSICIAN ASSISTANT

## 2024-07-19 PROCEDURE — 85027 COMPLETE CBC AUTOMATED: CPT | Performed by: PHYSICIAN ASSISTANT

## 2024-07-19 PROCEDURE — 80053 COMPREHEN METABOLIC PANEL: CPT | Performed by: PHYSICIAN ASSISTANT

## 2024-07-19 PROCEDURE — 83690 ASSAY OF LIPASE: CPT | Performed by: PHYSICIAN ASSISTANT

## 2024-07-19 RX ORDER — TRAZODONE HYDROCHLORIDE 50 MG/1
25-50 TABLET, FILM COATED ORAL AT BEDTIME
Qty: 30 TABLET | Refills: 0 | Status: SHIPPED | OUTPATIENT
Start: 2024-07-19 | End: 2024-08-19

## 2024-07-19 ASSESSMENT — ASTHMA QUESTIONNAIRES
ACT_TOTALSCORE: 24
QUESTION_5 LAST FOUR WEEKS HOW WOULD YOU RATE YOUR ASTHMA CONTROL: COMPLETELY CONTROLLED
QUESTION_4 LAST FOUR WEEKS HOW OFTEN HAVE YOU USED YOUR RESCUE INHALER OR NEBULIZER MEDICATION (SUCH AS ALBUTEROL): NOT AT ALL
QUESTION_1 LAST FOUR WEEKS HOW MUCH OF THE TIME DID YOUR ASTHMA KEEP YOU FROM GETTING AS MUCH DONE AT WORK, SCHOOL OR AT HOME: NONE OF THE TIME
QUESTION_3 LAST FOUR WEEKS HOW OFTEN DID YOUR ASTHMA SYMPTOMS (WHEEZING, COUGHING, SHORTNESS OF BREATH, CHEST TIGHTNESS OR PAIN) WAKE YOU UP AT NIGHT OR EARLIER THAN USUAL IN THE MORNING: NOT AT ALL
QUESTION_2 LAST FOUR WEEKS HOW OFTEN HAVE YOU HAD SHORTNESS OF BREATH: ONCE OR TWICE A WEEK
ACT_TOTALSCORE: 24

## 2024-07-19 NOTE — PROGRESS NOTES
"  Assessment & Plan     41 minutes spent by me on the date of the encounter doing chart review, patient visit, and documentation     Chest pain  -has had cardiac eval multiple times in the ED  -suspect GI component given her other GI sxs,  will send for GI consultation  -discussed alarm signs and symptoms to monitor for and discussed when to be reevaluated in the UC or ED     Flatulence, eructation and gas pain  -labs today  -GI referral placed.   - Helicobacter pylori Antigen Stool; Future  - Adult GI  Referral - Consult Only; Future  - Helicobacter pylori Antigen Stool    Sleep trouble  -trial of trazodone  -Side effects of medication(s) discussed as well as risks/benefits of taking    - traZODone (DESYREL) 50 MG tablet; Take 0.5-1 tablets (25-50 mg) by mouth at bedtime    Severe major depression (H)  -chronic, managing well.      Generalized abdominal pain  -labs as above  -GI referral placed  - Helicobacter pylori Antigen Stool; Future  - Comprehensive metabolic panel (BMP + Alb, Alk Phos, ALT, AST, Total. Bili, TP); Future  - Lipase; Future  - CBC with platelets; Future  - Adult GI  Referral - Consult Only; Future  - Helicobacter pylori Antigen Stool  - Comprehensive metabolic panel (BMP + Alb, Alk Phos, ALT, AST, Total. Bili, TP)  - Lipase  - CBC with platelets          BMI  Estimated body mass index is 26.63 kg/m  as calculated from the following:    Height as of this encounter: 1.676 m (5' 6\").    Weight as of this encounter: 74.8 kg (165 lb).         Markel Chowdary is a 34 year old, presenting for the following health issues:  RECHECK (Pt is here for follow up.)      7/19/2024     2:30 PM   Additional Questions   Roomed by Regla   Accompanied by Brother     -continues to have chest pain intermittently  -wakes her up from sleep, causes pain, whole body feels tingly, then will have diarrhea  -has been to the ED during these episodes 2-3 times.  Cardiac work up had been negative    -also " "complains of constant gas/flatulence but has gotten worse  -has lower abdominal cramping, sweating, nausea   -occurs intermittently, getting more often now and worse in the past few months  -Bms have not changed much has been constipated in the past.  Used Miralax in the past, no loner  -stools are not hard or small calibur  -no known causative food    History of Present Illness       Hypertension: She presents for follow up of hypertension.  She does check blood pressure  regularly outside of the clinic. Outside blood pressures have been over 140/90. She does not follow a low salt diet.     Headaches:   Since the patient's last clinic visit, headaches are: worsened  The patient is getting headaches:  Everyday  She is not able to do normal daily activities when she has a migraine.  The patient is taking the following rescue/relief medications:  Ibuprofen (Advil, Motrin) and Tylenol   Patient states \"I get only a small amount of relief\" from the rescue/relief medications.   The patient is taking the following medications to prevent migraines:  No medications to prevent migraines  In the past 4 weeks, the patient has gone to an Urgent Care or Emergency Room 0 times times due to headaches.    Vascular Disease:  She presents for follow up of vascular disease.     She never takes nitroglycerin. She is not taking daily aspirin.    She eats 0-1 servings of fruits and vegetables daily.She consumes 2 sweetened beverage(s) daily.She exercises with enough effort to increase her heart rate 30 to 60 minutes per day.  She exercises with enough effort to increase her heart rate 4 days per week.   She is taking medications regularly.         Objective    Temp 97.9  F (36.6  C) (Temporal)   Resp 16   Ht 1.676 m (5' 6\")   Wt 74.8 kg (165 lb)   LMP  (LMP Unknown)   SpO2 99%   BMI 26.63 kg/m    Body mass index is 26.63 kg/m .  Physical Exam   GENERAL: alert and no distress  NECK: no adenopathy, no asymmetry, masses, or " scars  RESP: lungs clear to auscultation - no rales, rhonchi or wheezes  CV: regular rate and rhythm, normal S1 S2, no S3 or S4, no murmur, click or rub, no peripheral edema  ABDOMEN: soft, nontender, no hepatosplenomegaly, no masses and bowel sounds normal  MS: no gross musculoskeletal defects noted, no edema          Signed Electronically by: Anila Saleem PA-C  Prior to immunization administration, verified patients identity using patient s name and date of birth. Please see Immunization Activity for additional information.     Screening Questionnaire for Adult Immunization    Are you sick today?      Do you have allergies to medications, food, a vaccine component or latex?      Have you ever had a serious reaction after receiving a vaccination?      Do you have a long-term health problem with heart, lung, kidney, or metabolic disease (e.g., diabetes), asthma, a blood disorder, no spleen, complement component deficiency, a cochlear implant, or a spinal fluid leak?  Are you on long-term aspirin therapy?      Do you have cancer, leukemia, HIV/AIDS, or any other immune system problem?      Do you have a parent, brother, or sister with an immune system problem?      In the past 3 months, have you taken medications that affect  your immune system, such as prednisone, other steroids, or anticancer drugs; drugs for the treatment of rheumatoid arthritis, Crohn s disease, or psoriasis; or have you had radiation treatments?      Have you had a seizure, or a brain or other nervous system problem?      During the past year, have you received a transfusion of blood or blood    products, or been given immune (gamma) globulin or antiviral drug?      For women: Are you pregnant or is there a chance you could become       pregnant during the next month?      Have you received any vaccinations in the past 4 weeks?        Immunization questionnaire       Patient instructed to remain in clinic for 15 minutes afterwards, and  to report any adverse reactions.     Screening performed by Regla Lilly MA on 7/19/2024 at 2:32 PM.

## 2024-07-23 ENCOUNTER — APPOINTMENT (OUTPATIENT)
Dept: LAB | Facility: CLINIC | Age: 34
End: 2024-07-23
Payer: COMMERCIAL

## 2024-07-23 PROCEDURE — 87338 HPYLORI STOOL AG IA: CPT | Performed by: PHYSICIAN ASSISTANT

## 2024-07-24 ENCOUNTER — MYC MEDICAL ADVICE (OUTPATIENT)
Dept: FAMILY MEDICINE | Facility: CLINIC | Age: 34
End: 2024-07-24
Payer: COMMERCIAL

## 2024-07-25 LAB — H PYLORI AG STL QL IA: NEGATIVE

## 2024-08-11 ENCOUNTER — HEALTH MAINTENANCE LETTER (OUTPATIENT)
Age: 34
End: 2024-08-11

## 2024-08-18 DIAGNOSIS — G47.9 SLEEP TROUBLE: ICD-10-CM

## 2024-08-19 RX ORDER — TRAZODONE HYDROCHLORIDE 50 MG/1
25-50 TABLET, FILM COATED ORAL AT BEDTIME
Qty: 30 TABLET | Refills: 0 | Status: SHIPPED | OUTPATIENT
Start: 2024-08-19 | End: 2024-10-04

## 2024-09-03 NOTE — TELEPHONE ENCOUNTER
REFERRAL INFORMATION:  Referring Provider:  Anila Saleem PA-C  Referring Clinic:  [facility]  Reason for Visit/Diagnosis:   R14.3, R14.1, R14.2 (ICD-10-CM) - Flatulence, eructation and gas pain   R10.84 (ICD-10-CM) - Generalized abdominal pain        FUTURE VISIT INFORMATION:  Appointment Date: 10/7/24  Appointment Time:      NOTES STATUS DETAILS   OFFICE NOTE from Referring Provider Internal 7/19/24   OFFICE NOTE from Other Specialist N/A    HOSPITAL DISCHARGE SUMMARY/  ED VISITS Care Everywhere ED 6/20/24-Dr. Jennings- [facility]  ED 2/7/24-Dr. Rucker-[facility]  ED 10/13/23-Dr. Lewis-[facility]  ED 1/24/22-Fercho Zafar PA-C-[facility]   OPERATIVE REPORT N/A    MEDICATION LIST Internal         ENDOSCOPY  N/A    COLONOSCOPY Care Everywhere 9/16/20-[facility]   ERCP N/A    EUS N/A    STOOL TESTING Internal 7/23/24-[facility]. Pyl[facility]   PERTINENT LABS Care Everywhere    PATHOLOGY REPORTS (RELATED) Care Everywhere 9/16/2011-[facility]   IMAGING (CT, MRI, EGD, MRCP, Small Bowel Follow Through/SBT, MR/CT Enterography) Care Everywhere 6/20/24-CT abd pel-[facility]  10/13/23-XR chest-[facility]       Records Requested     September 3, 2024 10:23 AM  [email or web address]   Facility  [facility] [place] [facility]   Outcome Requested images-received

## 2024-09-07 DIAGNOSIS — I10 BENIGN ESSENTIAL HYPERTENSION: ICD-10-CM

## 2024-09-08 RX ORDER — AMLODIPINE BESYLATE 10 MG/1
10 TABLET ORAL DAILY
Qty: 90 TABLET | Refills: 0 | Status: SHIPPED | OUTPATIENT
Start: 2024-09-08

## 2024-09-19 ENCOUNTER — MYC MEDICAL ADVICE (OUTPATIENT)
Dept: FAMILY MEDICINE | Facility: CLINIC | Age: 34
End: 2024-09-19
Payer: COMMERCIAL

## 2024-09-20 ENCOUNTER — OFFICE VISIT (OUTPATIENT)
Dept: FAMILY MEDICINE | Facility: CLINIC | Age: 34
End: 2024-09-20
Payer: COMMERCIAL

## 2024-09-20 VITALS
WEIGHT: 161.5 LBS | HEART RATE: 80 BPM | DIASTOLIC BLOOD PRESSURE: 82 MMHG | RESPIRATION RATE: 17 BRPM | HEIGHT: 66 IN | TEMPERATURE: 97.7 F | BODY MASS INDEX: 25.96 KG/M2 | SYSTOLIC BLOOD PRESSURE: 115 MMHG | OXYGEN SATURATION: 97 %

## 2024-09-20 DIAGNOSIS — I10 ESSENTIAL HYPERTENSION: ICD-10-CM

## 2024-09-20 DIAGNOSIS — R10.13 EPIGASTRIC PAIN: ICD-10-CM

## 2024-09-20 DIAGNOSIS — R06.09 DOE (DYSPNEA ON EXERTION): Primary | ICD-10-CM

## 2024-09-20 DIAGNOSIS — G47.9 SLEEP TROUBLE: ICD-10-CM

## 2024-09-20 DIAGNOSIS — F41.9 ANXIETY: ICD-10-CM

## 2024-09-20 PROCEDURE — 99214 OFFICE O/P EST MOD 30 MIN: CPT | Performed by: PHYSICIAN ASSISTANT

## 2024-09-20 RX ORDER — METOPROLOL SUCCINATE 50 MG/1
50 TABLET, EXTENDED RELEASE ORAL DAILY
Qty: 90 TABLET | OUTPATIENT
Start: 2024-09-20

## 2024-09-20 RX ORDER — METOPROLOL SUCCINATE 50 MG/1
50 TABLET, EXTENDED RELEASE ORAL DAILY
Qty: 30 TABLET | Refills: 3 | Status: SHIPPED | OUTPATIENT
Start: 2024-09-20

## 2024-09-20 NOTE — TELEPHONE ENCOUNTER
"Writer took incoming call back from patient. Patient states she was told to call back from missed call earlier. Writer attempted to triage patient for symptoms as mentioned by Denice's message below. Patient did state that she went to be seen at the \"[facility] yesterday--[facility].\" They gave her something to drink due to possible GERD symptoms, but her BP is still high. Patient stated she is concern about some of the labs that were done at the [facility] yesterday. Patient notes some of her liver numbers came back abnormal. Patient was told to follow-up with her PCP.     Assisted in scheduling patient for:    Sep 20, 2024 11:15 AM  (Arrive by 10:55 AM)  Provider Visit with Anila Saleem PA-C  [facility] ([facility] ) [phone]     Patient verbalizes understanding, agrees with plan and has no further questions.    HENRIETTA RothN, RN   [facility]    "

## 2024-09-20 NOTE — TELEPHONE ENCOUNTER
Called patient, spouse (Barry) answered and indicated patient is not available. Barry will have patient return call to clinic later today.     Ivy Best RN  [facility]

## 2024-09-20 NOTE — PROGRESS NOTES
"  Assessment & Plan     -discontinue propranolol  -start Toprol XL 50mg, recheck BP in 1 month  -see cardiology due to ongoing MERAZ  -discussed alarm signs and symptoms to monitor for and discussed when to be reevaluated in the UC or ED     MERAZ (dyspnea on exertion)  - Adult Cardiology Eval  Referral; Future    Essential hypertension  - metoprolol succinate ER (TOPROL XL) 50 MG 24 hr tablet; Take 1 tablet (50 mg) by mouth daily.  - Adult Cardiology Eval  Referral; Future    Anxiety  -pt reports she is doing ok.  Will monitor        BMI  Estimated body mass index is 26.27 kg/m  as calculated from the following:    Height as of this encounter: 1.67 m (5' 5.75\").    Weight as of this encounter: 73.3 kg (161 lb 8 oz).         Markel Chowdary is a 34 year old, presenting for the following health issues:  RECHECK (Pt is here for follow up)        9/20/2024    10:45 AM   Additional Questions   Roomed by Regla   Accompanied by       -was in the UC with elevated BP  BP Readings from Last 4 Encounters:  09/20/24 : 115/82  12/28/23 : 121/80  02/21/23 : 126/85  04/26/22 : 110/72  -had been taking propranolol 20mg 1-3 times per day.  Not helpful for anxiety or BP  -has been having more SOB with activity.  No CP  -Patient denies any exertional chest pain, dyspnea, palpitations, syncope, orthopnea, edema or paroxysmal nocturnal dyspnea.     -mental health is ok with Paxil, has been on this for years.     History of Present Illness       Hypertension: She presents for follow up of hypertension.  She does check blood pressure  regularly outside of the clinic. Outside blood pressures have been over 140/90. She follows a low salt diet.     She eats 2-3 servings of fruits and vegetables daily.She consumes 1 sweetened beverage(s) daily.She exercises with enough effort to increase her heart rate 10 to 19 minutes per day.  She exercises with enough effort to increase her heart rate 3 or less days per week. " "  She is taking medications regularly.           Objective    /82 (BP Location: Left arm, Patient Position: Sitting, Cuff Size: Adult Regular)   Pulse 80   Temp 97.7  F (36.5  C) (Temporal)   Resp 17   Ht 1.67 m (5' 5.75\")   Wt 73.3 kg (161 lb 8 oz)   LMP  (LMP Unknown)   SpO2 97%   BMI 26.27 kg/m    Body mass index is 26.27 kg/m .  Physical Exam   GENERAL: alert and no distress  NECK: no adenopathy, no asymmetry, masses, or scars  RESP: lungs clear to auscultation - no rales, rhonchi or wheezes  CV: regular rate and rhythm, normal S1 S2, no S3 or S4, no murmur, click or rub, no peripheral edema  ABDOMEN: soft, nontender, no hepatosplenomegaly, no masses and bowel sounds normal  MS: no gross musculoskeletal defects noted, no edema          Signed Electronically by: Anila Saleem PA-C  Prior to immunization administration, verified patients identity using patient s name and date of birth. Please see Immunization Activity for additional information.     Screening Questionnaire for Adult Immunization    Are you sick today?   Don't Know   Do you have allergies to medications, food, a vaccine component or latex?   No   Have you ever had a serious reaction after receiving a vaccination?   No   Do you have a long-term health problem with heart, lung, kidney, or metabolic disease (e.g., diabetes), asthma, a blood disorder, no spleen, complement component deficiency, a cochlear implant, or a spinal fluid leak?  Are you on long-term aspirin therapy?   No   Do you have cancer, leukemia, HIV/AIDS, or any other immune system problem?   No   Do you have a parent, brother, or sister with an immune system problem?   No   In the past 3 months, have you taken medications that affect  your immune system, such as prednisone, other steroids, or anticancer drugs; drugs for the treatment of rheumatoid arthritis, Crohn s disease, or psoriasis; or have you had radiation treatments?   No   Have you had a seizure, or a " brain or other nervous system problem?   No   During the past year, have you received a transfusion of blood or blood    products, or been given immune (gamma) globulin or antiviral drug?   No   For women: Are you pregnant or is there a chance you could become       pregnant during the next month?   No   Have you received any vaccinations in the past 4 weeks?   No     Immunization questionnaire answers were all negative.      Patient instructed to remain in clinic for 15 minutes afterwards, and to report any adverse reactions.     Screening performed by Regla Lilly MA on 9/20/2024 at 10:49 AM.

## 2024-10-04 ENCOUNTER — OFFICE VISIT (OUTPATIENT)
Dept: FAMILY MEDICINE | Facility: CLINIC | Age: 34
End: 2024-10-04
Payer: COMMERCIAL

## 2024-10-04 VITALS
TEMPERATURE: 97.1 F | WEIGHT: 165 LBS | RESPIRATION RATE: 21 BRPM | OXYGEN SATURATION: 100 % | SYSTOLIC BLOOD PRESSURE: 108 MMHG | HEIGHT: 66 IN | HEART RATE: 66 BPM | BODY MASS INDEX: 26.52 KG/M2 | DIASTOLIC BLOOD PRESSURE: 68 MMHG

## 2024-10-04 DIAGNOSIS — R06.83 SNORING: ICD-10-CM

## 2024-10-04 DIAGNOSIS — R10.13 EPIGASTRIC PAIN: ICD-10-CM

## 2024-10-04 DIAGNOSIS — R53.83 OTHER FATIGUE: ICD-10-CM

## 2024-10-04 DIAGNOSIS — Z12.4 CERVICAL CANCER SCREENING: Primary | ICD-10-CM

## 2024-10-04 PROCEDURE — 99214 OFFICE O/P EST MOD 30 MIN: CPT | Performed by: PHYSICIAN ASSISTANT

## 2024-10-04 RX ORDER — OMEPRAZOLE 40 MG/1
40 CAPSULE, DELAYED RELEASE ORAL
COMMUNITY
Start: 2024-09-19 | End: 2024-10-04

## 2024-10-04 RX ORDER — OMEPRAZOLE 40 MG/1
40 CAPSULE, DELAYED RELEASE ORAL DAILY
Qty: 30 CAPSULE | Refills: 3 | Status: SHIPPED | OUTPATIENT
Start: 2024-10-04

## 2024-10-04 NOTE — PROGRESS NOTES
"  Assessment & Plan     Other fatigue  -does snore, will send for sleep study  -discussed impact sleep apnea can have on fatigue  - Adult Sleep Eval & Management  Referral; Future    Snoring  - Adult Sleep Eval & Management  Referral; Future    Epigastric pain  -ok to start PPI  -has GI consult next week  -following FODMAP diet  - omeprazole (PRILOSEC) 40 MG DR capsule; Take 1 capsule (40 mg) by mouth daily.    Cervical cancer screening  -will RTC for this in Jan 2025, when planned recheck/cotesting       BMI  Estimated body mass index is 26.84 kg/m  as calculated from the following:    Height as of this encounter: 1.67 m (5' 5.75\").    Weight as of this encounter: 74.8 kg (165 lb).         Markel Chowdary is a 34 year old, presenting for the following health issues:  RECHECK        10/4/2024     4:00 PM   Additional Questions   Roomed by Regla   Accompanied by self     -ongoing GI sxs of bloating and upset stomach.  Has GI consult next week  -following FODMAP diet for the past month, has not made much of a difference    -complains of ongoing fatigue.  Tired all the time  -labs checked in July were WNL  -when asked, she does admit to snoring.   Her  has told her she stops breathing in her sleep.  Was worse when she was drinking but has stopped all alcohol use and this has helped but is still present.    -Patient denies any exertional chest pain, dyspnea, palpitations, syncope, orthopnea, edema or paroxysmal nocturnal dyspnea.     History of Present Illness       Hypertension: She presents for follow up of hypertension.  She does check blood pressure  regularly outside of the clinic. Outside blood pressures have been over 140/90. She follows a low salt diet.     She eats 2-3 servings of fruits and vegetables daily.She consumes 1 sweetened beverage(s) daily.She exercises with enough effort to increase her heart rate 30 to 60 minutes per day.  She exercises with enough effort to increase " "her heart rate 5 days per week.   She is taking medications regularly.         Objective    /68 (BP Location: Left arm, Patient Position: Sitting, Cuff Size: Adult Regular)   Pulse 66   Temp 97.1  F (36.2  C) (Temporal)   Resp 21   Ht 1.67 m (5' 5.75\")   Wt 74.8 kg (165 lb)   LMP  (LMP Unknown)   SpO2 100%   BMI 26.84 kg/m    Body mass index is 26.84 kg/m .  Physical Exam   GENERAL: alert and no distress  NECK: no adenopathy, no asymmetry, masses, or scars  RESP: lungs clear to auscultation - no rales, rhonchi or wheezes  CV: regular rate and rhythm, normal S1 S2, no S3 or S4, no murmur, click or rub, no peripheral edema  ABDOMEN: soft, nontender, no hepatosplenomegaly, no masses and bowel sounds normal  MS: no gross musculoskeletal defects noted, no edema          Signed Electronically by: Anila Saleem PA-C  Prior to immunization administration, verified patients identity using patient s name and date of birth. Please see Immunization Activity for additional information.     Screening Questionnaire for Adult Immunization    Are you sick today?   No   Do you have allergies to medications, food, a vaccine component or latex?   No   Have you ever had a serious reaction after receiving a vaccination?   No   Do you have a long-term health problem with heart, lung, kidney, or metabolic disease (e.g., diabetes), asthma, a blood disorder, no spleen, complement component deficiency, a cochlear implant, or a spinal fluid leak?  Are you on long-term aspirin therapy?   No   Do you have cancer, leukemia, HIV/AIDS, or any other immune system problem?   No   Do you have a parent, brother, or sister with an immune system problem?   Don't Know   In the past 3 months, have you taken medications that affect  your immune system, such as prednisone, other steroids, or anticancer drugs; drugs for the treatment of rheumatoid arthritis, Crohn s disease, or psoriasis; or have you had radiation treatments?   No "   Have you had a seizure, or a brain or other nervous system problem?   No   During the past year, have you received a transfusion of blood or blood    products, or been given immune (gamma) globulin or antiviral drug?   No   For women: Are you pregnant or is there a chance you could become       pregnant during the next month?   No   Have you received any vaccinations in the past 4 weeks?   No     Immunization questionnaire       Patient instructed to remain in clinic for 15 minutes afterwards, and to report any adverse reactions.     Screening performed by Regla Lilly MA on 10/4/2024 at 4:04 PM.

## 2024-10-06 RX ORDER — OMEPRAZOLE 40 MG/1
40 CAPSULE, DELAYED RELEASE ORAL DAILY
Qty: 90 CAPSULE | OUTPATIENT
Start: 2024-10-06

## 2024-10-07 ENCOUNTER — OFFICE VISIT (OUTPATIENT)
Dept: GASTROENTEROLOGY | Facility: CLINIC | Age: 34
End: 2024-10-07
Attending: PHYSICIAN ASSISTANT
Payer: COMMERCIAL

## 2024-10-07 ENCOUNTER — PRE VISIT (OUTPATIENT)
Dept: GASTROENTEROLOGY | Facility: CLINIC | Age: 34
End: 2024-10-07

## 2024-10-07 VITALS
SYSTOLIC BLOOD PRESSURE: 117 MMHG | DIASTOLIC BLOOD PRESSURE: 78 MMHG | OXYGEN SATURATION: 100 % | HEIGHT: 66 IN | HEART RATE: 65 BPM | BODY MASS INDEX: 26.5 KG/M2 | WEIGHT: 164.9 LBS

## 2024-10-07 DIAGNOSIS — R10.13 EPIGASTRIC PAIN: ICD-10-CM

## 2024-10-07 DIAGNOSIS — F32.2 SEVERE MAJOR DEPRESSION (H): ICD-10-CM

## 2024-10-07 DIAGNOSIS — R13.10 DYSPHAGIA, UNSPECIFIED TYPE: ICD-10-CM

## 2024-10-07 DIAGNOSIS — R11.0 NAUSEA: ICD-10-CM

## 2024-10-07 DIAGNOSIS — R10.84 GENERALIZED ABDOMINAL PAIN: Primary | ICD-10-CM

## 2024-10-07 DIAGNOSIS — R93.5 ABNORMAL CT OF THE ABDOMEN: ICD-10-CM

## 2024-10-07 DIAGNOSIS — R14.2 FLATULENCE, ERUCTATION AND GAS PAIN: ICD-10-CM

## 2024-10-07 DIAGNOSIS — R14.3 FLATULENCE, ERUCTATION AND GAS PAIN: ICD-10-CM

## 2024-10-07 DIAGNOSIS — R19.8 IRREGULAR BOWEL HABITS: ICD-10-CM

## 2024-10-07 DIAGNOSIS — R14.1 FLATULENCE, ERUCTATION AND GAS PAIN: ICD-10-CM

## 2024-10-07 PROCEDURE — 99205 OFFICE O/P NEW HI 60 MIN: CPT | Performed by: DIETITIAN, REGISTERED

## 2024-10-07 PROCEDURE — 99417 PROLNG OP E/M EACH 15 MIN: CPT | Performed by: DIETITIAN, REGISTERED

## 2024-10-07 RX ORDER — FAMOTIDINE 20 MG/1
20 TABLET, FILM COATED ORAL 2 TIMES DAILY
Qty: 60 TABLET | Refills: 5 | Status: SHIPPED | OUTPATIENT
Start: 2024-10-07 | End: 2024-10-19

## 2024-10-07 ASSESSMENT — PAIN SCALES - GENERAL: PAINLEVEL: MODERATE PAIN (5)
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
## 2024-10-07 NOTE — NURSING NOTE
"Chief Complaint   Patient presents with    New Patient       Vitals:    10/07/24 0744   BP: 117/78   Pulse: 65   SpO2: 100%   Weight: 74.8 kg (164 lb 14.4 oz)   Height: 1.676 m (5' 6\")       Body mass index is 26.62 kg/m .    Wesley Baird MA    "
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
## 2024-10-07 NOTE — PATIENT INSTRUCTIONS
It was a pleasure meeting with you today and discussing your healthcare plan. Below is a summary of what we covered:    -- Continue omeprazole 40 mg once daily, stop 2 weeks prior to upper endoscopy  -- Start famotidine, can use as needed up to twice daily for burning pain  -- Schedule upper endoscopy and colonoscopy   -- Get abdominal x-ray done to assess for stool burden, we can discuss bowel medications pending what this shows  -- Get lab work for celiac disease and thyroid dysfunction  -- Follow up with cardiology as planned    Please see below for any additional questions and scheduling guidelines.    Sign up for uromovie: uromovie patient portal serves as a secure platform for accessing your medical records from the [facility]. Additionally, uromovie facilitates easy, timely, and secure messaging with your care team. If you have not signed up, you may do so by using the provided code or calling [phone].    Coordinating your care after your visit:  There are multiple options for scheduling your follow-up care based on your provider's recommendation.    How do I schedule a follow-up clinic appointment:   After your appointment, you may receive scheduling assistance with the Clinic Coordinators by having a seat in the waiting room and a Clinic Coordinator will call you up to schedule.  Virtual visits or after you leave the clinic:  Your provider has placed a follow-up order in the uromovie portal for scheduling your return appointment. A member of the scheduling team will contact you to schedule.  RABThart Scheduling: Timely scheduling through uromovie is advised to ensure appointment availability.   Call to schedule: You may schedule your follow-up appointment(s) by calling [phone], option 1.    How do I schedule my endoscopy or colonoscopy procedure:  If a procedure, such as a colonoscopy or upper endoscopy was ordered by your provider, the scheduling team will contact you to schedule this  procedure. Or you may choose to call to schedule at   [phone], option 2.  Please allow 20-30 minutes when scheduling a procedure.    How do I get my blood work done? To get your blood work done, you need to schedule a lab appointment at an [facility]. There are multiple ways to schedule:   At the clinic: The Clinic Coordinator you meet after your visit can help you schedule a lab appointment.   Touch-Writer scheduling: Touch-Writer offers online lab scheduling at all [facility] laboratory locations.   Call to schedule: You can call [phone] to schedule your lab appointment.    How do I schedule my imaging study: To schedule imaging studies, such as CT scans, ultrasounds, MRIs, or X-rays, contact Imaging Services at [phone].    How do I schedule a referral to another doctor: If your provider recommended a referral to another specialist(s), the referral order was placed by your provider. You will receive a phone call to schedule this referral, or you may choose to call the number attached to the referral to self-schedule.    For Post-Visit Question(s):  For any inquiries following today's visit:  Please utilize Touch-Writer messaging and allow 48 hours for reply or contact the Call Center during normal business hours at [phone], option 3.  For Emergent After-hours questions, contact the On-Call GI Fellow through the [facility]  at [phone].  In addition, you may contact your Nurse directly using the provided contact information.    Test Results:  Test results will be accessible via Touch-Writer in compliance with the 21st Century Cures Act. This means that your results will be available to you at the same time as your provider. Often you may see your results before your provider does. Results are reviewed by staff within two weeks with communication follow-up. Results may be released in the patient portal prior to your care team review.    Prescription Refill(s):   Medication prescribed by your provider will be addressed during your visit. For future refills, please coordinate with your pharmacy. If you have not had a recent clinic visit or routine labs, for your safety, your provider may not be able to refill your prescription.

## 2024-10-08 ENCOUNTER — LAB (OUTPATIENT)
Dept: LAB | Facility: CLINIC | Age: 34
End: 2024-10-08
Payer: COMMERCIAL

## 2024-10-08 ENCOUNTER — HOSPITAL ENCOUNTER (OUTPATIENT)
Dept: RADIOLOGY | Facility: HOSPITAL | Age: 34
Discharge: HOME | End: 2024-10-08
Attending: DIETITIAN, REGISTERED | Admitting: DIETITIAN, REGISTERED
Payer: COMMERCIAL

## 2024-10-08 DIAGNOSIS — R10.84 GENERALIZED ABDOMINAL PAIN: ICD-10-CM

## 2024-10-08 DIAGNOSIS — R14.2 FLATULENCE, ERUCTATION AND GAS PAIN: ICD-10-CM

## 2024-10-08 DIAGNOSIS — R14.1 FLATULENCE, ERUCTATION AND GAS PAIN: ICD-10-CM

## 2024-10-08 DIAGNOSIS — R14.3 FLATULENCE, ERUCTATION AND GAS PAIN: ICD-10-CM

## 2024-10-08 LAB — TSH SERPL DL<=0.005 MIU/L-ACNC: 0.47 UIU/ML (ref 0.3–4.2)

## 2024-10-08 PROCEDURE — 84443 ASSAY THYROID STIM HORMONE: CPT

## 2024-10-08 PROCEDURE — 36415 COLL VENOUS BLD VENIPUNCTURE: CPT

## 2024-10-08 PROCEDURE — 86258 DGP ANTIBODY EACH IG CLASS: CPT

## 2024-10-08 PROCEDURE — 74018 RADEX ABDOMEN 1 VIEW: CPT

## 2024-10-08 PROCEDURE — 82784 ASSAY IGA/IGD/IGG/IGM EACH: CPT

## 2024-10-08 PROCEDURE — 86364 TISS TRNSGLTMNASE EA IG CLAS: CPT

## 2024-10-09 LAB
GLIADIN IGA SER-ACNC: 1.3 U/ML
GLIADIN IGG SER-ACNC: <0.6 U/ML
IGA SERPL-MCNC: 160 MG/DL (ref 84–499)
TTG IGA SER-ACNC: <0.2 U/ML
TTG IGG SER-ACNC: <0.6 U/ML

## 2024-10-19 ENCOUNTER — APPOINTMENT (OUTPATIENT)
Dept: RADIOLOGY | Facility: CLINIC | Age: 34
End: 2024-10-19
Payer: COMMERCIAL

## 2024-10-19 ENCOUNTER — HOSPITAL ENCOUNTER (EMERGENCY)
Facility: CLINIC | Age: 34
Discharge: HOME | End: 2024-10-19
Payer: COMMERCIAL

## 2024-10-19 VITALS
TEMPERATURE: 97.3 F | DIASTOLIC BLOOD PRESSURE: 75 MMHG | SYSTOLIC BLOOD PRESSURE: 118 MMHG | WEIGHT: 165 LBS | HEIGHT: 65 IN | RESPIRATION RATE: 18 BRPM | HEART RATE: 66 BPM | BODY MASS INDEX: 27.49 KG/M2 | OXYGEN SATURATION: 99 %

## 2024-10-19 DIAGNOSIS — R07.9 CHEST PAIN, UNSPECIFIED TYPE: ICD-10-CM

## 2024-10-19 LAB
ALBUMIN SERPL BCG-MCNC: 4.7 G/DL (ref 3.5–5.2)
ALP SERPL-CCNC: 87 U/L (ref 40–150)
ALT SERPL W P-5'-P-CCNC: 61 U/L (ref 0–50)
ANION GAP SERPL CALCULATED.3IONS-SCNC: 13 MMOL/L (ref 7–15)
AST SERPL W P-5'-P-CCNC: 37 U/L (ref 0–45)
ATRIAL RATE - MUSE: 75 BPM
BASOPHILS # BLD AUTO: 0.1 10E3/UL (ref 0–0.2)
BASOPHILS NFR BLD AUTO: 1 %
BILIRUB SERPL-MCNC: 0.2 MG/DL
BUN SERPL-MCNC: 14.7 MG/DL (ref 6–20)
CALCIUM SERPL-MCNC: 9.8 MG/DL (ref 8.8–10.4)
CHLORIDE SERPL-SCNC: 105 MMOL/L (ref 98–107)
CREAT SERPL-MCNC: 0.78 MG/DL (ref 0.51–0.95)
D DIMER PPP FEU-MCNC: <=0.27 UG/ML FEU (ref 0–0.5)
DIASTOLIC BLOOD PRESSURE - MUSE: 94 MMHG
EGFRCR SERPLBLD CKD-EPI 2021: >90 ML/MIN/1.73M2
EOSINOPHIL # BLD AUTO: 0.1 10E3/UL (ref 0–0.7)
EOSINOPHIL NFR BLD AUTO: 1 %
ERYTHROCYTE [DISTWIDTH] IN BLOOD BY AUTOMATED COUNT: 11.7 % (ref 10–15)
GLUCOSE SERPL-MCNC: 101 MG/DL (ref 70–99)
HCG SERPL QL: NEGATIVE
HCO3 SERPL-SCNC: 21 MMOL/L (ref 22–29)
HCT VFR BLD AUTO: 37.4 % (ref 35–47)
HGB BLD-MCNC: 13.3 G/DL (ref 11.7–15.7)
IMM GRANULOCYTES # BLD: 0 10E3/UL
IMM GRANULOCYTES NFR BLD: 0 %
INTERPRETATION ECG - MUSE: NORMAL
LIPASE SERPL-CCNC: 42 U/L (ref 13–60)
LYMPHOCYTES # BLD AUTO: 3.4 10E3/UL (ref 0.8–5.3)
LYMPHOCYTES NFR BLD AUTO: 33 %
MCH RBC QN AUTO: 30 PG (ref 26.5–33)
MCHC RBC AUTO-ENTMCNC: 35.6 G/DL (ref 31.5–36.5)
MCV RBC AUTO: 84 FL (ref 78–100)
MONOCYTES # BLD AUTO: 0.8 10E3/UL (ref 0–1.3)
MONOCYTES NFR BLD AUTO: 7 %
NEUTROPHILS # BLD AUTO: 6 10E3/UL (ref 1.6–8.3)
NEUTROPHILS NFR BLD AUTO: 58 %
NRBC # BLD AUTO: 0 10E3/UL
NRBC BLD AUTO-RTO: 0 /100
P AXIS - MUSE: 33 DEGREES
PLATELET # BLD AUTO: 302 10E3/UL (ref 150–450)
POTASSIUM SERPL-SCNC: 4.1 MMOL/L (ref 3.4–5.3)
PR INTERVAL - MUSE: 128 MS
PROT SERPL-MCNC: 7.6 G/DL (ref 6.4–8.3)
QRS DURATION - MUSE: 76 MS
QT - MUSE: 380 MS
QTC - MUSE: 424 MS
R AXIS - MUSE: 58 DEGREES
RBC # BLD AUTO: 4.44 10E6/UL (ref 3.8–5.2)
SODIUM SERPL-SCNC: 139 MMOL/L (ref 135–145)
SYSTOLIC BLOOD PRESSURE - MUSE: 150 MMHG
T AXIS - MUSE: 50 DEGREES
TROPONIN T SERPL HS-MCNC: <6 NG/L
VENTRICULAR RATE- MUSE: 75 BPM
WBC # BLD AUTO: 10.4 10E3/UL (ref 4–11)

## 2024-10-19 PROCEDURE — 250N000013 HC RX MED GY IP 250 OP 250 PS 637

## 2024-10-19 PROCEDURE — 84703 CHORIONIC GONADOTROPIN ASSAY: CPT

## 2024-10-19 PROCEDURE — 83690 ASSAY OF LIPASE: CPT

## 2024-10-19 PROCEDURE — 93005 ELECTROCARDIOGRAM TRACING: CPT

## 2024-10-19 PROCEDURE — 36415 COLL VENOUS BLD VENIPUNCTURE: CPT

## 2024-10-19 PROCEDURE — 99285 EMERGENCY DEPT VISIT HI MDM: CPT | Mod: 25

## 2024-10-19 PROCEDURE — 250N000011 HC RX IP 250 OP 636

## 2024-10-19 PROCEDURE — 84484 ASSAY OF TROPONIN QUANT: CPT

## 2024-10-19 PROCEDURE — 96374 THER/PROPH/DIAG INJ IV PUSH: CPT

## 2024-10-19 PROCEDURE — 85379 FIBRIN DEGRADATION QUANT: CPT

## 2024-10-19 PROCEDURE — 80053 COMPREHEN METABOLIC PANEL: CPT

## 2024-10-19 PROCEDURE — 71046 X-RAY EXAM CHEST 2 VIEWS: CPT

## 2024-10-19 PROCEDURE — 85025 COMPLETE CBC W/AUTO DIFF WBC: CPT

## 2024-10-19 RX ORDER — MAGNESIUM HYDROXIDE/ALUMINUM HYDROXICE/SIMETHICONE 120; 1200; 1200 MG/30ML; MG/30ML; MG/30ML
30 SUSPENSION ORAL ONCE
Status: COMPLETED | OUTPATIENT
Start: 2024-10-19 | End: 2024-10-19

## 2024-10-19 RX ORDER — FAMOTIDINE 20 MG/1
20 TABLET, FILM COATED ORAL 2 TIMES DAILY
Qty: 60 TABLET | Refills: 0 | Status: SHIPPED | OUTPATIENT
Start: 2024-10-19 | End: 2024-11-18

## 2024-10-19 RX ADMIN — ALUMINUM HYDROXIDE, MAGNESIUM HYDROXIDE, AND SIMETHICONE 30 ML: 1200; 120; 1200 SUSPENSION ORAL at 21:02

## 2024-10-19 RX ADMIN — FAMOTIDINE 40 MG: 10 INJECTION, SOLUTION INTRAVENOUS at 21:02

## 2024-10-19 ASSESSMENT — ACTIVITIES OF DAILY LIVING (ADL)
ADLS_ACUITY_SCORE: 35
ADLS_ACUITY_SCORE: 35

## 2024-10-19 ASSESSMENT — COLUMBIA-SUICIDE SEVERITY RATING SCALE - C-SSRS
6. HAVE YOU EVER DONE ANYTHING, STARTED TO DO ANYTHING, OR PREPARED TO DO ANYTHING TO END YOUR LIFE?: NO
2. HAVE YOU ACTUALLY HAD ANY THOUGHTS OF KILLING YOURSELF IN THE PAST MONTH?: NO
1. IN THE PAST MONTH, HAVE YOU WISHED YOU WERE DEAD OR WISHED YOU COULD GO TO SLEEP AND NOT WAKE UP?: NO

## 2024-10-20 NOTE — ED PROVIDER NOTES
EMERGENCY DEPARTMENT ENCOUNTER      NAME: Ricarda Echavarria  AGE: 34 year old female  YOB: 1990  MRN: [id number]  EVALUATION DATE & TIME: 10/19/2024  8:38 PM    PCP: Anila Saleem    ED PROVIDER: Patrice Zuluaga MD      Chief Complaint   Patient presents with    Chest Pain         FINAL IMPRESSION:  1. Chest pain, unspecified type          ED COURSE & MEDICAL DECISION MAKING:    Pertinent Labs & Imaging studies reviewed. (See chart for details)  34 year old female presents to the Emergency Department for evaluation of chest pain.  Differential diagnosis considered Myocardial infarction, acute coronary syndrome, congestive heart failure, aortic dissection, esophageal rupture, pulmonary embolism, pneumothorax, cardiac tamponade, cocaine mediated chest pain, endocarditis, GERD, pleurisy, rib fracture, costochondritis, pericarditis, herpes zoster  .     ED Course as of 10/19/24 2300   Sat Oct 19, 2024   2015 Patient presenting with left-sided chest pain described as shooting and burning wraparound her left upper back.  She also feels like her left upper extremity from her shoulder to elbow is heavy.  On exam patient is well-appearing nontoxic.  Initially mildly hypertensive however this resolved in the emergency department.  Her left upper extremity appears normal she is neurovascularly intact.  She has intact strength   And sensation.  She has strong palpable radial pulse.  No sign of infection.  I doubt arterial clot or DVT.    Her left-sided chest pain has been intermittent for the past few months and has been seen multiple times in the emergency department.  She is concerned she is having a heart attack.  She also has acid reflux however today's symptoms feel different that she is having mild epigastric pain.  She has no epigastric tenderness.  Significant treated with GI cocktail and Pepcid with complete symptomatic relief of her epigastric pain.  She still is having intermittent left-sided chest  pain.  Her EKG does not show sign of ischemia or arrhythmia.  She has some nonspecific T waves without STEMI criteria.  Will obtain troponin and D-dimer as well as abdominal labs for further workup   2145 Troponin T, High Sensitivity  Low troponin   2206 D-dimer is not elevated, doubt PE or DVT   2231 XR Chest 2 Views  No large mass, pneumothorax, rib fracture   2231 Notified patient of her workup and evaluation.  Remained well-appearing and nontoxic.  To follow-up with cardiology on Tuesday and believe this is reasonable.  Low concern for ACS.  Unclear etiology though I doubt herpes zoster, aortic dissection, pneumothorax, pneumonia or other nefarious process.       Not Applicable    I discussed the plan for discharge with the patient, and patient is agreeable. We discussed supportive cares at home and reasons for return to the ER including new or worsening symptoms - all questions and concerns addressed to the best of my ability. Strict return precautions discussed. Patient to be discharged by RN.    At the conclusion of the encounter I discussed the results of all of the tests and the disposition. The questions were answered. The patient or family acknowledged understanding and was agreeable with the care plan.     MEDICATIONS GIVEN IN THE EMERGENCY:  Medications   alum & mag hydroxide-simethicone (MAALOX) suspension 30 mL (30 mLs Oral $Given 10/19/24 2102)   famotidine (PEPCID) injection 40 mg (40 mg Intravenous $Given 10/19/24 2102)       NEW PRESCRIPTIONS STARTED AT TODAY'S ER VISIT  New Prescriptions    FAMOTIDINE (PEPCID) 20 MG TABLET    Take 1 tablet (20 mg) by mouth 2 times daily.     Modified Medications    No medications on file       =================================================================    HPI    Patient information was obtained from: Patient    Use of : N/A         Ricarda Echavarria is a 34 year old female with a pertinent history of HTN who presents to this ED for evaluation of chest  "pain.    Patient reports left-sided chest pain that started yesterday at 0500. She reports associating back pain, which she describes as a shooting and burning sensation that is constant and waxes and wanes. She now reports some left arm numbness as well. Patient reports a history of ongoing issues with this pain. She has been evaluated by cardiology in the past and she was told that her symptoms could be due to GERD. She is prescribed Omeprazole and has been taking this at home. Patient has an upcoming appointment with cardiology in three days. She reports a history of hypertension. No history of diabetes. No known family cardiac history. No other complaints at this time.    PHYSICAL EXAM    /75   Pulse 66   Temp 97.3  F (36.3  C) (Temporal)   Resp 18   Ht 1.651 m (5' 5\")   Wt 74.8 kg (165 lb)   LMP  (LMP Unknown)   SpO2 99%   BMI 27.46 kg/m    Constitutional: Well developed, well nourished. Comfortable appearing.  Head: Normocephalic, atraumatic, mucous membranes moist, nose normal.   Neck: Supple, gross ROM intact.   Back: Does have tenderness over the left thoracic musculature, no overlying rash.  Eyes: Pupils mid-range, sclera white.  Respiratory: Clear to auscultation bilaterally, no respiratory distress, no wheezing, speaks full sentences easily.  Cardiovascular: Normal heart rate, regular rhythm, no murmurs. No lower extremity edema.   GI: Soft, no tenderness to deep palpation in all quadrants.  Musculoskeletal: Moving all 4 extremities intentionally and without pain. No obvious deformity.  Absence of strength in the left upper extremity, intact sensation in the median, ulnar, nerve distribution, strong palpable radial pulse.  No rash on her thorax or extremities.  Skin: Warm, dry, no rash.  Neurologic: Alert & oriented x 3, speech clear, moving all extremities spontaneously   Psychiatric: Affect normal, cooperative.       LAB:  All pertinent labs reviewed and interpreted.  Results for orders " placed or performed during the hospital encounter of 10/19/24   XR Chest 2 Views    Impression    IMPRESSION: Negative chest.   Comprehensive metabolic panel   Result Value Ref Range    Sodium 139 135 - 145 mmol/L    Potassium 4.1 3.4 - 5.3 mmol/L    Carbon Dioxide (CO2) 21 (L) 22 - 29 mmol/L    Anion Gap 13 7 - 15 mmol/L    Urea Nitrogen 14.7 6.0 - 20.0 mg/dL    Creatinine 0.78 0.51 - 0.95 mg/dL    GFR Estimate >90 >60 mL/min/1.73m2    Calcium 9.8 8.8 - 10.4 mg/dL    Chloride 105 98 - 107 mmol/L    Glucose 101 (H) 70 - 99 mg/dL    Alkaline Phosphatase 87 40 - 150 U/L    AST 37 0 - 45 U/L    ALT 61 (H) 0 - 50 U/L    Protein Total 7.6 6.4 - 8.3 g/dL    Albumin 4.7 3.5 - 5.2 g/dL    Bilirubin Total 0.2 <=1.2 mg/dL   Result Value Ref Range    Lipase 42 13 - 60 U/L   Result Value Ref Range    Troponin T, High Sensitivity <6 <=14 ng/L   HCG QUALitative pregnancy (blood)   Result Value Ref Range    hCG Serum Qualitative Negative Negative   CBC with platelets and differential   Result Value Ref Range    WBC Count 10.4 4.0 - 11.0 10e3/uL    RBC Count 4.44 3.80 - 5.20 10e6/uL    Hemoglobin 13.3 11.7 - 15.7 g/dL    Hematocrit 37.4 35.0 - 47.0 %    MCV 84 78 - 100 fL    MCH 30.0 26.5 - 33.0 pg    MCHC 35.6 31.5 - 36.5 g/dL    RDW 11.7 10.0 - 15.0 %    Platelet Count 302 150 - 450 10e3/uL    % Neutrophils 58 %    % Lymphocytes 33 %    % Monocytes 7 %    % Eosinophils 1 %    % Basophils 1 %    % Immature Granulocytes 0 %    NRBCs per 100 WBC 0 <1 /100    Absolute Neutrophils 6.0 1.6 - 8.3 10e3/uL    Absolute Lymphocytes 3.4 0.8 - 5.3 10e3/uL    Absolute Monocytes 0.8 0.0 - 1.3 10e3/uL    Absolute Eosinophils 0.1 0.0 - 0.7 10e3/uL    Absolute Basophils 0.1 0.0 - 0.2 10e3/uL    Absolute Immature Granulocytes 0.0 <=0.4 10e3/uL    Absolute NRBCs 0.0 10e3/uL   D dimer quantitative   Result Value Ref Range    D-Dimer Quantitative <=0.27 0.00 - 0.50 ug/mL FEU   ECG 12-Lead with MUSE - [facility]   Result Value Ref Range    Systolic  Blood Pressure 150 mmHg    Diastolic Blood Pressure 94 mmHg    Ventricular Rate 75 BPM    Atrial Rate 75 BPM    IL Interval 128 ms    QRS Duration 76 ms     ms    QTc 424 ms    P Axis 33 degrees    R AXIS 58 degrees    T Axis 50 degrees    Interpretation ECG       Sinus rhythm  Nonspecific T wave abnormality  Abnormal ECG  When compared with ECG of 04-Feb-2021 10:04,  Vent. rate has decreased by  59 bpm  ST no longer depressed in Inferior leads  Nonspecific T wave abnormality has replaced inverted T waves in Inferior leads  Nonspecific T wave abnormality, worse in Anterior leads  Confirmed by SEE ED PROVIDER NOTE FOR, ECG INTERPRETATION ([id number]),  ERIKA LOPEZ ([id number]) on 10/19/2024 9:00:48 PM         RADIOLOGY:  Reviewed all pertinent imaging. Please see official radiology report.  XR Chest 2 Views   Final Result   IMPRESSION: Negative chest.          EKG:    Interpretation: Interpreted by me contemporaneously  Rate: 75  Rhythm is normal sinus rhythm  IL interval normal  QRS interval normal  QT interval normal  ST-T changes: Nonspecific ST changes  Impression: Sinus rhythm, nonspecific ST changes, abnormal EKG      I have independently reviewed and interpreted the EKG(s) documented above.    PROCEDURES:   N/A      Patrice Zuluaga MD  [facility] EMERGENCY ROOM  [facility] [id number] [phone]   =================================================================    BILLING:  Data  Category 1  Non-ED record review, if applicable. External record reviewed:  Reviewed office visit from gastroenterology from [place], car who recommended Pepcid.  Patient not been taking this medication.     Clinical information was obtained from an independent historian. History was obtained from: Patient and Significant other provided additional information in the HPI     The following testing was considered but ultimately not selected after discussion with patient/family: N/A      Category 2  My independent interpretation of EKG, rhythm strip, radiology study: Chest x-ray did not reveal large pneumothorax     Category 3  Discussion of management with other physician/healthcare provider/other source: N/A       Risk  Prescription medication was considered, but ultimately not given after discussion with patient/family: I considered ordering a prescription for narcotic pain medicine.  However, I feel patient's condition can be adequately treated with non-narcotic medications and that the risk of a narcotic pain medicine prescription outweighs the benefits.     Chronic conditions affecting care: Hypertension     Care significantly affected by Social Determinants of Health: N/A     Consideration of Admission/Observation: Escalation of care including admission/observation was considered given the complexity and risk of the patient's presenting complaint, exam findings, and/or their underlying comorbidities. However, ultimately I feel the patient is safe for outpatient management with close follow up. Reasoning: Work-up reassuring, does not reveal any acute life/organ threatening processes, patient's symptoms well controlled upon reevaluation, reexamination is reassuring, vitals are stable, patient agreeable with discharge, reliable for follow-up.   Workup overall reassuring and patient had symptomatic relief with symptomatic treatment.  She has prompt follow-up with cardiology later this week and I believe follow up is reasonable and do not require admission for further workup and evaluation.             I, Anila Moon, am serving as a scribe to document services personally performed by Patrice Zuluaga MD based on my observation and the provider's statements to me. I, Patrice Zuluaga MD, attest that Anila moon is acting in a scribe capacity, has observed my performance of the services and has documented them in accordance with my direction.     Patrice Zuluaga MD  10/19/24 [id number]

## 2024-10-20 NOTE — DISCHARGE INSTRUCTIONS
You were evaluated in the Emergency Department today for chest pain. Your evaluation has shown no signs of medical conditions requiring emergent intervention at this time, however we recommend that you follow up with your primary care physician or your cardiologist as soon as possible for further testing as an outpatient.    Please schedule an appointment for follow up with your primary care physician as directed.  Please schedule an appointment with for follow-up with your cardiologist as directed.  Follow-up as scheduled on Tuesday.  Please do not perform vigorous exercise until cleared by cardiology.      Return to the Emergency Department if you experience worsening or uncontrolled chest pain, shortness of breath, lightheadedness, feeling faint, nausea, vomiting, or any other concerning symptoms.    Thank you for choosing us for your care.

## 2024-10-20 NOTE — ED TRIAGE NOTES
Patient presents to the ED complaining of left sided chest pain.  She states she has been having ongoing issues with this pain.  She has an appointment with cardiology this coming Tuesday.       Triage Assessment (Adult)       Row Name 10/19/24 2032          Triage Assessment    Airway WDL WDL        Respiratory WDL    Respiratory WDL WDL        Skin Circulation/Temperature WDL    Skin Circulation/Temperature WDL WDL        Cardiac WDL    Cardiac WDL X;chest pain        Chest Pain Assessment    Chest Pain Location anterior chest, left     Chest Pain Radiation arm        Peripheral/Neurovascular WDL    Peripheral Neurovascular WDL WDL        Cognitive/Neuro/Behavioral WDL    Cognitive/Neuro/Behavioral WDL WDL

## 2024-10-20 NOTE — ED NOTES
Discharge instructions discussed with pt and family. All questions answered. AVS and prescription handed to pt.

## 2024-10-21 ENCOUNTER — PATIENT OUTREACH (OUTPATIENT)
Dept: CARE COORDINATION | Facility: CLINIC | Age: 34
End: 2024-10-21
Payer: COMMERCIAL

## 2024-10-21 NOTE — PROGRESS NOTES
Clinic Care Coordination Contact  Community Health Worker Initial Outreach    CHW Initial Information Gathering:  Referral Source: ED Follow-Up  Current living arrangement:: Not Assessed  CHW Additional Questions  If ED/Hospital discharge, follow-up appointment scheduled as recommended?: Yes  Medication changes made following ED/Hospital discharge?: No (Pt has not picked up prescription yet, planning to do so later today)  MyChart active?: Yes  Patient sent Social Determinants of Health questionnaire?: No    Patient accepts CC: No, Patient expressed no additional support is needed at this time. Patient will be sent Care Coordination introduction letter for future reference.     Myrna Gongora  Community Health Worker    Connected Care Resources   [facility]     *Connected Care Resources Team does NOT   follow patient ongoing. Referrals are identified   based on internal discharge report and the outreach is to ensure   Patient has understanding of their discharge instructions.

## 2024-10-22 ENCOUNTER — OFFICE VISIT (OUTPATIENT)
Dept: CARDIOLOGY | Facility: CLINIC | Age: 34
End: 2024-10-22
Payer: COMMERCIAL

## 2024-10-22 VITALS
SYSTOLIC BLOOD PRESSURE: 114 MMHG | WEIGHT: 164.8 LBS | DIASTOLIC BLOOD PRESSURE: 70 MMHG | OXYGEN SATURATION: 99 % | RESPIRATION RATE: 16 BRPM | HEART RATE: 64 BPM | BODY MASS INDEX: 26.48 KG/M2 | HEIGHT: 66 IN

## 2024-10-22 DIAGNOSIS — Z87.891 FORMER SMOKER: ICD-10-CM

## 2024-10-22 DIAGNOSIS — R07.89 ATYPICAL CHEST PAIN: Primary | ICD-10-CM

## 2024-10-22 DIAGNOSIS — R06.09 DOE (DYSPNEA ON EXERTION): ICD-10-CM

## 2024-10-22 DIAGNOSIS — I10 ESSENTIAL HYPERTENSION: ICD-10-CM

## 2024-10-22 PROCEDURE — G2211 COMPLEX E/M VISIT ADD ON: HCPCS | Performed by: STUDENT IN AN ORGANIZED HEALTH CARE EDUCATION/TRAINING PROGRAM

## 2024-10-22 PROCEDURE — 99204 OFFICE O/P NEW MOD 45 MIN: CPT | Performed by: STUDENT IN AN ORGANIZED HEALTH CARE EDUCATION/TRAINING PROGRAM

## 2024-10-22 NOTE — PROGRESS NOTES
[facility] HEART CARE   [address]  [place] [address]   [email or web address]   OFFICE: [phone]     CARDIOLOGY CLINIC NOTE     Thank you, Dr. Saleem, Anila Mccord, for asking the [facility] Heart Care team to see Ms. Ricarda Echavarria to evaluate New Patient          History of Present Illness   Ms. Ricarda Echavarria is a 34 year old female with a significant past history of HTN, former smoker, who presents for evaluation of chest pain and dyspnea on exertion.  The patient notes these are longstanding issues but they have been worsening recently.  She has had multiple ED visits for chest pain.  She notes pain is L sided, described as a burning with a tightness component.  This seems to always be present but can wax and wane.  When severe, she gets diarrhea, nausea +/- vomiting.  She denies any significant exertional component to this.  She does note shortness of  breath and inability to keep up with her kids.  She used to be able to ride bike, now cannot.  She notes palpitations described as skipped beats or flutter.  She does get some fast heart beats when pain gets worse, but this is short lived.  She notes her father has heart disease but does not know any specifics.  They are estranged.  Her mother is .         Medications  Allergies   Current Outpatient Medications   Medication Sig Dispense Refill    amLODIPine (NORVASC) 10 MG tablet TAKE 1 TABLET(10 MG) BY MOUTH DAILY 90 tablet 0    ibuprofen (ADVIL/MOTRIN) 200 MG tablet Take 200 mg by mouth as needed      metoprolol succinate ER (TOPROL XL) 50 MG 24 hr tablet Take 1 tablet (50 mg) by mouth daily. 30 tablet 3    omeprazole (PRILOSEC) 40 MG DR capsule Take 1 capsule (40 mg) by mouth daily. 30 capsule 3    PARoxetine (PAXIL) 10 MG tablet Take 1 tablet (10 mg) by mouth every morning 90 tablet 2    TYLENOL 325 MG OR TABS Take 650 mg by mouth as needed for headaches.  0    famotidine (PEPCID) 20 MG tablet Take 1  "tablet (20 mg) by mouth 2 times daily. (Patient not taking: Reported on 10/22/2024) 60 tablet 0      Allergies   Allergen Reactions    Zofran [Ondansetron] Headache        Physical Examination Review of Systems   Vitals: /70 (BP Location: Right arm, Patient Position: Sitting, Cuff Size: Adult Regular)   Pulse 64   Resp 16   Ht 1.676 m (5' 6\")   Wt 74.8 kg (164 lb 12.8 oz)   LMP  (LMP Unknown)   SpO2 99%   BMI 26.60 kg/m    BMI= Body mass index is 26.6 kg/m .  Wt Readings from Last 3 Encounters:   10/22/24 74.8 kg (164 lb 12.8 oz)   10/19/24 74.8 kg (165 lb)   10/07/24 74.8 kg (164 lb 14.4 oz)       General: pleasant female. No acute distress.   Neck: No JVD  Lungs: clear to auscultation  COR:  regular rate and rhythm, No murmurs, rubs, or gallops  Extrem: No edema        Please refer above for cardiac ROS details.       Past History     Family History:   Family History   Problem Relation Age of Onset    Migraines Mother     Asthma Mother     Heart Disease Father     Asthma Sister     Asthma Brother     Asthma Maternal Grandmother     Asthma Maternal Grandfather     Alcoholism Maternal Grandfather     Arthritis Maternal Grandfather     Clotting Disorder Maternal Grandfather     Depression Maternal Grandfather     Diabetes Maternal Grandfather     Substance Abuse Maternal Grandfather     Asthma Daughter     Asthma Son     Cancer Maternal Uncle         Social History:   Social History     Socioeconomic History    Marital status:      Spouse name: Not on file    Number of children: Not on file    Years of education: Not on file    Highest education level: Not on file   Occupational History    Not on file   Tobacco Use    Smoking status: Former     Average packs/day: 1 pack/day for 15.0 years (15.0 ttl pk-yrs)     Types: Cigarettes     Start date: 2008     Passive exposure: Never    Smokeless tobacco: Never   Vaping Use    Vaping status: Never Used   Substance and Sexual Activity    Alcohol use: No    " Drug use: Never    Sexual activity: Yes     Partners: Male     Birth control/protection: None   Other Topics Concern    Not on file   Social History Narrative    Lives with , 3 children        Social Determinants of Health     Financial Resource Strain: High Risk (12/28/2023)    Financial Resource Strain     Within the past 12 months, have you or your family members you live with been unable to get utilities (heat, electricity) when it was really needed?: Yes   Food Insecurity: High Risk (12/28/2023)    Food Insecurity     Within the past 12 months, did you worry that your food would run out before you got money to buy more?: Yes     Within the past 12 months, did the food you bought just not last and you didn t have money to get more?: Yes   Transportation Needs: Low Risk  (12/28/2023)    Transportation Needs     Within the past 12 months, has lack of transportation kept you from medical appointments, getting your medicines, non-medical meetings or appointments, work, or from getting things that you need?: No   Physical Activity: Not on file   Stress: Not on file   Social Connections: Not on file   Interpersonal Safety: Low Risk  (7/19/2024)    Interpersonal Safety     Do you feel physically and emotionally safe where you currently live?: Yes     Within the past 12 months, have you been hit, slapped, kicked or otherwise physically hurt by someone?: No     Within the past 12 months, have you been humiliated or emotionally abused in other ways by your partner or ex-partner?: No   Housing Stability: High Risk (12/28/2023)    Housing Stability     Do you have housing? : No     Are you worried about losing your housing?: Yes            Lab Results    Chemistry/lipid CBC Cardiac Enzymes/BNP/TSH/INR   Lab Results   Component Value Date    CHOL 171 01/19/2021    HDL 48 (L) 01/19/2021    TRIG 126 01/19/2021    BUN 14.7 10/19/2024     10/19/2024    CO2 21 (L) 10/19/2024    Lab Results   Component Value Date     WBC 10.4 10/19/2024    HGB 13.3 10/19/2024    HCT 37.4 10/19/2024    MCV 84 10/19/2024     10/19/2024    Lab Results   Component Value Date    TROPONINI <0.01 02/04/2021    TSH 0.47 10/08/2024          Cardiac Problems and Cardiac Diagnostics     Most Recent Cardiac testing:  ECG Personal interpretation  10/19/2024  NSR  Nonspecific T wave    ECHO 2/22/2021  Narrative & Impression  1. Normal left ventricular size and systolic performance with a visually estimated ejection fraction of 60-65%.   2. No significant valvular heart disease is identified on this study.   3. Normal right ventricular size and systolic performance.          Assessment/Recommendations   Assessment:    Ms. Ricarda Echavarria is a 34 year old female with a significant past history of HTN, former smoker, who presents for evaluation of chest pain and dyspnea on exertion.      Chest pain/MERAZ   - Will evaluate with a cardiac CTA to evaluate for obstructive and non-obstructive plaque which will help to determine candidacy for pharmacologic cholesterol lowering therapy, as well as for coronary anomaly which is a possibility given her longstanding symptoms.  High negative predictive value of this test will be very helpful for her.   - TTE to evaluate heart structure and function    HTN   - Well controlled   - Continue current management for now.  Metoprolol maybe contributing to fatigue.  Will look to switch away from this if CCTA is negative    RTC 4 months    The longitudinal plan of care for the diagnosis(es)/condition(s) as documented were addressed during this visit. Due to the added complexity in care, I will continue to support Ricarda in the subsequent management and with ongoing continuity of care.               Gary Kirby DO [facility]  Non-invasive Cardiologist  [facility]

## 2024-10-22 NOTE — LETTER
10/22/2024    Anila Saleem PA-C  [address] [place] [address]    RE: Ricarda Echavarria       Dear Colleague,     I had the pleasure of seeing Ricarda Echavarria in the [facility].    [facility]   [address]  [place] [id number]   [email or web address]   OFFICE: [phone]     CARDIOLOGY CLINIC NOTE     Thank you, Dr. Saleem, Anila Mccord, for asking the [facility] Care team to see Ms. Ricarda Echavarria to evaluate New Patient          History of Present Illness   Ms. Ricarda Echavarria is a 34 year old female with a significant past history of HTN, former smoker, who presents for evaluation of chest pain and dyspnea on exertion.  The patient notes these are longstanding issues but they have been worsening recently.  She has had multiple ED visits for chest pain.  She notes pain is L sided, described as a burning with a tightness component.  This seems to always be present but can wax and wane.  When severe, she gets diarrhea, nausea +/- vomiting.  She denies any significant exertional component to this.  She does note shortness of  breath and inability to keep up with her kids.  She used to be able to ride bike, now cannot.  She notes palpitations described as skipped beats or flutter.  She does get some fast heart beats when pain gets worse, but this is short lived.  She notes her father has heart disease but does not know any specifics.  They are estranged.  Her mother is .         Medications  Allergies   Current Outpatient Medications   Medication Sig Dispense Refill     amLODIPine (NORVASC) 10 MG tablet TAKE 1 TABLET(10 MG) BY MOUTH DAILY 90 tablet 0     ibuprofen (ADVIL/MOTRIN) 200 MG tablet Take 200 mg by mouth as needed       metoprolol succinate ER (TOPROL XL) 50 MG 24 hr tablet Take 1 tablet (50 mg) by mouth daily. 30 tablet 3     omeprazole (PRILOSEC) 40 MG DR capsule Take 1 capsule (40 mg) by mouth daily. 30 capsule 3      "PARoxetine (PAXIL) 10 MG tablet Take 1 tablet (10 mg) by mouth every morning 90 tablet 2     TYLENOL 325 MG OR TABS Take 650 mg by mouth as needed for headaches.  0     famotidine (PEPCID) 20 MG tablet Take 1 tablet (20 mg) by mouth 2 times daily. (Patient not taking: Reported on 10/22/2024) 60 tablet 0      Allergies   Allergen Reactions     Zofran [Ondansetron] Headache        Physical Examination Review of Systems   Vitals: /70 (BP Location: Right arm, Patient Position: Sitting, Cuff Size: Adult Regular)   Pulse 64   Resp 16   Ht 1.676 m (5' 6\")   Wt 74.8 kg (164 lb 12.8 oz)   LMP  (LMP Unknown)   SpO2 99%   BMI 26.60 kg/m    BMI= Body mass index is 26.6 kg/m .  Wt Readings from Last 3 Encounters:   10/22/24 74.8 kg (164 lb 12.8 oz)   10/19/24 74.8 kg (165 lb)   10/07/24 74.8 kg (164 lb 14.4 oz)       General: pleasant female. No acute distress.   Neck: No JVD  Lungs: clear to auscultation  COR:  regular rate and rhythm, No murmurs, rubs, or gallops  Extrem: No edema        Please refer above for cardiac ROS details.       Past History     Family History:   Family History   Problem Relation Age of Onset     Migraines Mother      Asthma Mother      Heart Disease Father      Asthma Sister      Asthma Brother      Asthma Maternal Grandmother      Asthma Maternal Grandfather      Alcoholism Maternal Grandfather      Arthritis Maternal Grandfather      Clotting Disorder Maternal Grandfather      Depression Maternal Grandfather      Diabetes Maternal Grandfather      Substance Abuse Maternal Grandfather      Asthma Daughter      Asthma Son      Cancer Maternal Uncle         Social History:   Social History     Socioeconomic History     Marital status:      Spouse name: Not on file     Number of children: Not on file     Years of education: Not on file     Highest education level: Not on file   Occupational History     Not on file   Tobacco Use     Smoking status: Former     Average packs/day: 1 " pack/day for 15.0 years (15.0 ttl pk-yrs)     Types: Cigarettes     Start date: 2008     Passive exposure: Never     Smokeless tobacco: Never   Vaping Use     Vaping status: Never Used   Substance and Sexual Activity     Alcohol use: No     Drug use: Never     Sexual activity: Yes     Partners: Male     Birth control/protection: None   Other Topics Concern     Not on file   Social History Narrative    Lives with , 3 children        Social Determinants of Health     Financial Resource Strain: High Risk (12/28/2023)    Financial Resource Strain      Within the past 12 months, have you or your family members you live with been unable to get utilities (heat, electricity) when it was really needed?: Yes   Food Insecurity: High Risk (12/28/2023)    Food Insecurity      Within the past 12 months, did you worry that your food would run out before you got money to buy more?: Yes      Within the past 12 months, did the food you bought just not last and you didn t have money to get more?: Yes   Transportation Needs: Low Risk  (12/28/2023)    Transportation Needs      Within the past 12 months, has lack of transportation kept you from medical appointments, getting your medicines, non-medical meetings or appointments, work, or from getting things that you need?: No   Physical Activity: Not on file   Stress: Not on file   Social Connections: Not on file   Interpersonal Safety: Low Risk  (7/19/2024)    Interpersonal Safety      Do you feel physically and emotionally safe where you currently live?: Yes      Within the past 12 months, have you been hit, slapped, kicked or otherwise physically hurt by someone?: No      Within the past 12 months, have you been humiliated or emotionally abused in other ways by your partner or ex-partner?: No   Housing Stability: High Risk (12/28/2023)    Housing Stability      Do you have housing? : No      Are you worried about losing your housing?: Yes            Lab Results    Chemistry/lipid  CBC Cardiac Enzymes/BNP/TSH/INR   Lab Results   Component Value Date    CHOL 171 01/19/2021    HDL 48 (L) 01/19/2021    TRIG 126 01/19/2021    BUN 14.7 10/19/2024     10/19/2024    CO2 21 (L) 10/19/2024    Lab Results   Component Value Date    WBC 10.4 10/19/2024    HGB 13.3 10/19/2024    HCT 37.4 10/19/2024    MCV 84 10/19/2024     10/19/2024    Lab Results   Component Value Date    TROPONINI <0.01 02/04/2021    TSH 0.47 10/08/2024          Cardiac Problems and Cardiac Diagnostics     Most Recent Cardiac testing:  ECG Personal interpretation  10/19/2024  NSR  Nonspecific T wave    ECHO 2/22/2021  Narrative & Impression  1. Normal left ventricular size and systolic performance with a visually estimated ejection fraction of 60-65%.   2. No significant valvular heart disease is identified on this study.   3. Normal right ventricular size and systolic performance.          Assessment/Recommendations   Assessment:    Ms. Ricarda Echavarria is a 34 year old female with a significant past history of HTN, former smoker, who presents for evaluation of chest pain and dyspnea on exertion.      Chest pain/MERAZ   - Will evaluate with a cardiac CTA to evaluate for obstructive and non-obstructive plaque which will help to determine candidacy for pharmacologic cholesterol lowering therapy, as well as for coronary anomaly which is a possibility given her longstanding symptoms.  High negative predictive value of this test will be very helpful for her.   - TTE to evaluate heart structure and function    HTN   - Well controlled   - Continue current management for now.  Metoprolol maybe contributing to fatigue.  Will look to switch away from this if CCTA is negative    RTC 4 months    The longitudinal plan of care for the diagnosis(es)/condition(s) as documented were addressed during this visit. Due to the added complexity in care, I will continue to support Ricarda in the subsequent management and with ongoing continuity of  care.               Gary Kirby DO [facility]  Non-invasive Cardiologist  [facility]         Thank you for allowing me to participate in the care of your patient.      Sincerely,     DO ISRAEL Plasencia [facility]  cc:   Anila Saleem PA-C  [address] [place] [address]

## 2024-10-22 NOTE — PATIENT INSTRUCTIONS
It was a pleasure meeting you today    In summary:    We will get a cardiac CT scan and an echocardiogram to evaluate your symptoms.  If your CT scan is negative I will switch your metoprolol to a different BP medication.    Please call my nurse Johnny Gray at [phone] if you have any questions or issues.    We will schedule a follow up visit in  4 months      Gary Kirby DO [facility]  Non-invasive Cardiologist  [facility]

## 2024-10-25 ENCOUNTER — TELEPHONE (OUTPATIENT)
Dept: GASTROENTEROLOGY | Facility: CLINIC | Age: 34
End: 2024-10-25
Payer: COMMERCIAL

## 2024-10-25 NOTE — TELEPHONE ENCOUNTER
"Endoscopy Scheduling Screen    Have you had any respiratory illness or flu-like symptoms in the last 10 days?  No    What is your communication preference for Instructions and/or Bowel Prep?   MyChart    What insurance is in the chart?  Other:  BCBS    Ordering/Referring Provider: Elaina Cullen PA-C in [facility]     (If ordering provider performs procedure, schedule with ordering provider unless otherwise instructed. )    BMI: Estimated body mass index is 26.6 kg/m  as calculated from the following:    Height as of 10/22/24: 1.676 m (5' 6\").    Weight as of 10/22/24: 74.8 kg (164 lb 12.8 oz).     Sedation Ordered  MAC/deep sedation.   BMI<= 45 45 < BMI <= 48 48 < BMI < = 50  BMI > 50   No Restrictions No [facility]  No [facility]  [facility] with exceptions Hospital Only OR Only       Do you have a history of malignant hyperthermia?  No    (Females) Are you currently pregnant?   No     Have you been diagnosed or told you have pulmonary hypertension?   PATIENT HAS UP UPCOMING STUDY SCHEDULED    Do you have an LVAD?  No    Have you been told you have moderate to severe sleep apnea?  No.    Have you been told you have COPD, asthma, or any other lung disease?  Yes     What breathing problems do you have?  Asthma     Do you use home oxygen?  No    Have your breathing problems required an ED visit or hospitalization in the last year?  No.    Do you have any heart conditions?  No     Have you ever had or are you waiting for an organ transplant?  No. Continue scheduling, no site restrictions.    Have you had a stroke or transient ischemic attack (TIA aka \"mini stroke\" in the last 6 months?   No    Have you been diagnosed with or been told you have cirrhosis of the liver?   No.    Are you currently on dialysis?   No    Do you need assistance transferring?   No    BMI: Estimated body mass index is 26.6 kg/m  as calculated from the following:    Height as of 10/22/24: 1.676 m (5' 6\").    Weight as of 10/22/24: 74.8 kg " (164 lb 12.8 oz).     Is patients BMI > 40 and scheduling location [facility]?  No    Do you take an injectable or oral medication for weight loss or diabetes (excluding insulin)?  No    Do you take the medication Naltrexone?  No    Do you take blood thinners?  No       Prep   Are you currently on dialysis or do you have chronic kidney disease?  No    Do you have a diagnosis of diabetes?  No    Do you have a diagnosis of cystic fibrosis (CF)?  No    On a regular basis do you go 3 -5 days between bowel movements?  No    BMI > 40?  No    Preferred Pharmacy:      [id number] - [place], [place] - [address]  [address] [place] [address]  Phone: [phone] Fax: [phone]      Final Scheduling Details     Procedure scheduled  Colonoscopy / Upper endoscopy (EGD)    Surgeon:  AUDI     Date of procedure:  2/21     Pre-OP / PAC:   No - Not required for this site.    Location  [facility] - Patient preference.    Sedation   MAC/Deep Sedation - Per order.      Patient Reminders:   You will receive a call from a Nurse to review instructions and health history.  This assessment must be completed prior to your procedure.  Failure to complete the Nurse assessment may result in the procedure being cancelled.      On the day of your procedure, please designate an adult(s) who can drive you home stay with you for the next 24 hours. The medicines used in the exam will make you sleepy. You will not be able to drive.      You cannot take public transportation, ride share services, or non-medical taxi service without a responsible caregiver.  Medical transport services are allowed with the requirement that a responsible caregiver will receive you at your destination.  We require that drivers and caregivers are confirmed prior to your procedure.

## 2024-10-28 RX ORDER — METOPROLOL TARTRATE 1 MG/ML
5-20 INJECTION, SOLUTION INTRAVENOUS
Status: DISCONTINUED | OUTPATIENT
Start: 2024-10-28 | End: 2024-11-06 | Stop reason: HOSPADM

## 2024-10-28 RX ORDER — NITROGLYCERIN 0.4 MG/1
0.4 TABLET SUBLINGUAL
Status: DISCONTINUED | OUTPATIENT
Start: 2024-10-28 | End: 2024-11-06 | Stop reason: HOSPADM

## 2024-10-28 RX ORDER — DILTIAZEM HYDROCHLORIDE 5 MG/ML
10-15 INJECTION INTRAVENOUS
Status: DISCONTINUED | OUTPATIENT
Start: 2024-10-28 | End: 2024-11-06 | Stop reason: HOSPADM

## 2024-11-04 ENCOUNTER — TELEPHONE (OUTPATIENT)
Dept: NURSING | Facility: CLINIC | Age: 34
End: 2024-11-04
Payer: COMMERCIAL

## 2024-11-04 DIAGNOSIS — Z20.818 PERTUSSIS EXPOSURE: Primary | ICD-10-CM

## 2024-11-04 RX ORDER — AZITHROMYCIN 250 MG/1
250 TABLET, FILM COATED ORAL DAILY
Qty: 6 TABLET | Refills: 0 | Status: SHIPPED | OUTPATIENT
Start: 2024-11-04 | End: 2024-11-09

## 2024-11-04 NOTE — TELEPHONE ENCOUNTER
Ricarda was notified her daughter has B. Pertussis and Ricarda is also coughing for 4 days, no fever. She is in agreement to starting Azithromycin.

## 2024-11-05 ENCOUNTER — HOSPITAL ENCOUNTER (OUTPATIENT)
Dept: CARDIOLOGY | Facility: CLINIC | Age: 34
Discharge: HOME | End: 2024-11-05
Attending: STUDENT IN AN ORGANIZED HEALTH CARE EDUCATION/TRAINING PROGRAM
Payer: COMMERCIAL

## 2024-11-05 ENCOUNTER — HOSPITAL ENCOUNTER (OUTPATIENT)
Dept: CT IMAGING | Facility: CLINIC | Age: 34
Discharge: HOME | End: 2024-11-05
Attending: STUDENT IN AN ORGANIZED HEALTH CARE EDUCATION/TRAINING PROGRAM
Payer: COMMERCIAL

## 2024-11-05 VITALS
HEIGHT: 66 IN | WEIGHT: 160 LBS | BODY MASS INDEX: 25.71 KG/M2 | DIASTOLIC BLOOD PRESSURE: 64 MMHG | SYSTOLIC BLOOD PRESSURE: 119 MMHG

## 2024-11-05 DIAGNOSIS — R07.89 ATYPICAL CHEST PAIN: Primary | ICD-10-CM

## 2024-11-05 DIAGNOSIS — R06.09 DOE (DYSPNEA ON EXERTION): ICD-10-CM

## 2024-11-05 DIAGNOSIS — I10 ESSENTIAL HYPERTENSION: ICD-10-CM

## 2024-11-05 DIAGNOSIS — R07.89 ATYPICAL CHEST PAIN: ICD-10-CM

## 2024-11-05 LAB
BSA FOR ECHO PROCEDURE: 0 M2
CV CALCIUM SCORE AGATSTON LM: 0
CV CALCIUM SCORING AGATSON LAD: 0
CV CALCIUM SCORING AGATSTON CX: 0
CV CALCIUM SCORING AGATSTON RCA: 0
CV CALCIUM SCORING AGATSTON TOTAL: 0
LVEF ECHO: NORMAL

## 2024-11-05 PROCEDURE — 93306 TTE W/DOPPLER COMPLETE: CPT | Mod: 26 | Performed by: INTERNAL MEDICINE

## 2024-11-05 PROCEDURE — 75574 CT ANGIO HRT W/3D IMAGE: CPT | Mod: 26 | Performed by: STUDENT IN AN ORGANIZED HEALTH CARE EDUCATION/TRAINING PROGRAM

## 2024-11-05 PROCEDURE — 250N000009 HC RX 250: Performed by: STUDENT IN AN ORGANIZED HEALTH CARE EDUCATION/TRAINING PROGRAM

## 2024-11-05 PROCEDURE — 75574 CT ANGIO HRT W/3D IMAGE: CPT

## 2024-11-05 PROCEDURE — 250N000011 HC RX IP 250 OP 636: Performed by: STUDENT IN AN ORGANIZED HEALTH CARE EDUCATION/TRAINING PROGRAM

## 2024-11-05 PROCEDURE — 93306 TTE W/DOPPLER COMPLETE: CPT

## 2024-11-05 PROCEDURE — 250N000013 HC RX MED GY IP 250 OP 250 PS 637: Performed by: STUDENT IN AN ORGANIZED HEALTH CARE EDUCATION/TRAINING PROGRAM

## 2024-11-05 RX ORDER — TELMISARTAN 20 MG/1
20 TABLET ORAL DAILY
Qty: 30 TABLET | Refills: 0 | Status: SHIPPED | OUTPATIENT
Start: 2024-11-05

## 2024-11-05 RX ORDER — IOPAMIDOL 755 MG/ML
100 INJECTION, SOLUTION INTRAVASCULAR ONCE
Status: COMPLETED | OUTPATIENT
Start: 2024-11-05 | End: 2024-11-05

## 2024-11-05 RX ADMIN — METOROPROLOL TARTRATE 5 MG: 5 INJECTION, SOLUTION INTRAVENOUS at 10:03

## 2024-11-05 RX ADMIN — IOPAMIDOL 100 ML: 755 INJECTION, SOLUTION INTRAVENOUS at 10:10

## 2024-11-05 RX ADMIN — METOROPROLOL TARTRATE 5 MG: 5 INJECTION, SOLUTION INTRAVENOUS at 10:07

## 2024-11-05 RX ADMIN — NITROGLYCERIN 0.4 MG: 0.4 TABLET SUBLINGUAL at 10:00

## 2024-11-07 ENCOUNTER — E-VISIT (OUTPATIENT)
Dept: FAMILY MEDICINE | Facility: CLINIC | Age: 34
End: 2024-11-07
Payer: COMMERCIAL

## 2024-11-07 ENCOUNTER — MYC MEDICAL ADVICE (OUTPATIENT)
Dept: FAMILY MEDICINE | Facility: CLINIC | Age: 34
End: 2024-11-07
Payer: COMMERCIAL

## 2024-11-07 DIAGNOSIS — N39.0 ACUTE UTI (URINARY TRACT INFECTION): Primary | ICD-10-CM

## 2024-11-07 PROCEDURE — 99421 OL DIG E/M SVC 5-10 MIN: CPT | Performed by: PHYSICIAN ASSISTANT

## 2024-11-07 NOTE — TELEPHONE ENCOUNTER
Writer replied to patient via [employer]. E-visit advised.  MACO Nagel, RN (she/her)  [facility] RN

## 2024-11-08 RX ORDER — SULFAMETHOXAZOLE AND TRIMETHOPRIM 800; 160 MG/1; MG/1
1 TABLET ORAL 2 TIMES DAILY
Qty: 6 TABLET | Refills: 0 | Status: SHIPPED | OUTPATIENT
Start: 2024-11-08 | End: 2024-11-11

## 2024-11-08 NOTE — PATIENT INSTRUCTIONS
Dear Ricarda Echavarria    After reviewing your responses, I've been able to diagnose you with a urinary tract infection, which is a common infection of the bladder with bacteria.  This is not a sexually transmitted infection, though urinating immediately after intercourse can help prevent infections.  Drinking lots of fluids is also helpful to clear your current infection and prevent the next one.      I have sent a prescription for antibiotics to your pharmacy to treat this infection.    It is important that you take all of your prescribed medication even if your symptoms are improving after a few doses.  Taking all of your medicine helps prevent the symptoms from returning.     If your symptoms worsen, you develop pain in your back or stomach, develop fevers, or are not improving in 5 days, please contact your primary care provider for an appointment or visit any of our convenient Walk-in or Urgent Care Centers to be seen, which can be found on our website here.    Thanks again for choosing us as your health care partner,    Anila Saleem PA-C  Urinary Tract Infection (UTI) in Women: Care Instructions  Overview     A urinary tract infection (UTI) is an infection caused by bacteria. It can happen anywhere in the urinary tract. A UTI can happen in the:  Kidneys.  Ureters, the tubes that connect the kidneys to the bladder.  Bladder.  Urethra, where the urine comes out.  Most UTIs are bladder infections. They often cause pain or burning when you urinate.  Most UTIs can be cured with antibiotics. If you are prescribed antibiotics, be sure to complete your treatment so that the infection does not get worse.  Follow-up care is a key part of your treatment and safety. Be sure to make and go to all appointments, and call your doctor if you are having problems. It's also a good idea to know your test results and keep a list of the medicines you take.  How can you care for yourself at home?  Take your antibiotics as  "directed. Do not stop taking them just because you feel better. You need to take the full course of antibiotics.  Drink extra water and other fluids for the next day or two. This will help make the urine less concentrated and help wash out the bacteria that are causing the infection. (If you have kidney, heart, or liver disease and have to limit fluids, talk with your doctor before you increase the amount of fluids you drink.)  Avoid drinks that are carbonated or have caffeine. They can irritate the bladder.  Urinate often. Try to empty your bladder each time.  To relieve pain, take a hot bath or lay a heating pad set on low over your lower belly or genital area. Never go to sleep with a heating pad in place.  To prevent UTIs  Drink plenty of water each day. This helps you urinate often, which clears bacteria from your system. (If you have kidney, heart, or liver disease and have to limit fluids, talk with your doctor before you increase the amount of fluids you drink.)  Urinate when you need to.  If you are sexually active, urinate right after you have sex.  Change sanitary pads often.  Avoid douches, bubble baths, feminine hygiene sprays, and other feminine hygiene products that have deodorants.  After going to the bathroom, wipe from front to back.  When should you call for help?   Call your doctor now or seek immediate medical care if:    You have new or worse fever, chills, nausea, or vomiting.     You have new pain in your back just below your rib cage. This is called flank pain.     There is new blood or pus in your urine.     You have any problems with your antibiotic medicine.   Watch closely for changes in your health, and be sure to contact your doctor if:    You are not getting better after taking an antibiotic for 2 days.     Your symptoms go away but then come back.   Where can you learn more?  Go to https://www.healthwise.net/patiented  Enter K848 in the search box to learn more about \"Urinary Tract " "Infection (UTI) in Women: Care Instructions.\"  Current as of: November 15, 2023  Content Version: 14.2 2024 [facility].   Care instructions adapted under license by your healthcare professional. If you have questions about a medical condition or this instruction, always ask your healthcare professional. [employer]wise[employer] disclaims any warranty or liability for your use of this information.    "

## 2024-11-11 ENCOUNTER — LAB (OUTPATIENT)
Dept: LAB | Facility: CLINIC | Age: 34
End: 2024-11-11
Payer: COMMERCIAL

## 2024-11-11 DIAGNOSIS — I10 ESSENTIAL HYPERTENSION: ICD-10-CM

## 2024-11-11 DIAGNOSIS — R07.89 ATYPICAL CHEST PAIN: ICD-10-CM

## 2024-11-11 DIAGNOSIS — R06.09 DOE (DYSPNEA ON EXERTION): ICD-10-CM

## 2024-11-11 PROCEDURE — 80048 BASIC METABOLIC PNL TOTAL CA: CPT

## 2024-11-11 PROCEDURE — 36415 COLL VENOUS BLD VENIPUNCTURE: CPT

## 2024-11-12 LAB
ANION GAP SERPL CALCULATED.3IONS-SCNC: 12 MMOL/L (ref 7–15)
BUN SERPL-MCNC: 12 MG/DL (ref 6–20)
CALCIUM SERPL-MCNC: 9.8 MG/DL (ref 8.8–10.4)
CHLORIDE SERPL-SCNC: 101 MMOL/L (ref 98–107)
CREAT SERPL-MCNC: 0.7 MG/DL (ref 0.51–0.95)
EGFRCR SERPLBLD CKD-EPI 2021: >90 ML/MIN/1.73M2
GLUCOSE SERPL-MCNC: 98 MG/DL (ref 70–99)
HCO3 SERPL-SCNC: 24 MMOL/L (ref 22–29)
POTASSIUM SERPL-SCNC: 4 MMOL/L (ref 3.4–5.3)
SODIUM SERPL-SCNC: 137 MMOL/L (ref 135–145)

## 2024-11-21 ENCOUNTER — LAB (OUTPATIENT)
Dept: LAB | Facility: CLINIC | Age: 34
End: 2024-11-21
Payer: COMMERCIAL

## 2024-11-21 ENCOUNTER — OFFICE VISIT (OUTPATIENT)
Dept: SLEEP MEDICINE | Facility: CLINIC | Age: 34
End: 2024-11-21
Attending: PHYSICIAN ASSISTANT
Payer: COMMERCIAL

## 2024-11-21 VITALS
OXYGEN SATURATION: 98 % | HEART RATE: 99 BPM | HEIGHT: 66 IN | DIASTOLIC BLOOD PRESSURE: 88 MMHG | WEIGHT: 166.3 LBS | BODY MASS INDEX: 26.73 KG/M2 | SYSTOLIC BLOOD PRESSURE: 125 MMHG

## 2024-11-21 DIAGNOSIS — G47.00 INSOMNIA, UNSPECIFIED TYPE: Primary | ICD-10-CM

## 2024-11-21 DIAGNOSIS — R06.83 SNORING: ICD-10-CM

## 2024-11-21 DIAGNOSIS — R53.83 OTHER FATIGUE: ICD-10-CM

## 2024-11-21 DIAGNOSIS — F51.5 NIGHTMARES: ICD-10-CM

## 2024-11-21 DIAGNOSIS — G47.00 INSOMNIA, UNSPECIFIED TYPE: ICD-10-CM

## 2024-11-21 DIAGNOSIS — I10 ESSENTIAL HYPERTENSION: ICD-10-CM

## 2024-11-21 DIAGNOSIS — R45.1 MOTOR RESTLESSNESS: ICD-10-CM

## 2024-11-21 LAB
FERRITIN SERPL-MCNC: 164 NG/ML (ref 6–175)
IRON BINDING CAPACITY (ROCHE): 296 UG/DL (ref 240–430)
IRON SATN MFR SERPL: 36 % (ref 15–46)
IRON SERPL-MCNC: 108 UG/DL (ref 37–145)
T3FREE SERPL-MCNC: 3.7 PG/ML (ref 2–4.4)
T4 FREE SERPL-MCNC: 1.25 NG/DL (ref 0.9–1.7)
TSH SERPL DL<=0.005 MIU/L-ACNC: 0.58 UIU/ML (ref 0.3–4.2)

## 2024-11-21 PROCEDURE — 84443 ASSAY THYROID STIM HORMONE: CPT

## 2024-11-21 PROCEDURE — 36415 COLL VENOUS BLD VENIPUNCTURE: CPT

## 2024-11-21 PROCEDURE — 83550 IRON BINDING TEST: CPT

## 2024-11-21 PROCEDURE — 84439 ASSAY OF FREE THYROXINE: CPT

## 2024-11-21 PROCEDURE — 82728 ASSAY OF FERRITIN: CPT

## 2024-11-21 PROCEDURE — 84481 FREE ASSAY (FT-3): CPT

## 2024-11-21 PROCEDURE — 83540 ASSAY OF IRON: CPT

## 2024-11-21 RX ORDER — GABAPENTIN 100 MG/1
100 CAPSULE ORAL AT BEDTIME
Qty: 90 CAPSULE | Refills: 1 | Status: SHIPPED | OUTPATIENT
Start: 2024-11-21

## 2024-11-21 ASSESSMENT — SLEEP AND FATIGUE QUESTIONNAIRES
HOW LIKELY ARE YOU TO NOD OFF OR FALL ASLEEP WHILE WATCHING TV: WOULD NEVER DOZE
HOW LIKELY ARE YOU TO NOD OFF OR FALL ASLEEP IN A CAR, WHILE STOPPED FOR A FEW MINUTES IN TRAFFIC: WOULD NEVER DOZE
HOW LIKELY ARE YOU TO NOD OFF OR FALL ASLEEP WHEN YOU ARE A PASSENGER IN A CAR FOR AN HOUR WITHOUT A BREAK: WOULD NEVER DOZE
HOW LIKELY ARE YOU TO NOD OFF OR FALL ASLEEP WHILE LYING DOWN TO REST IN THE AFTERNOON WHEN CIRCUMSTANCES PERMIT: WOULD NEVER DOZE
HOW LIKELY ARE YOU TO NOD OFF OR FALL ASLEEP WHILE SITTING INACTIVE IN A PUBLIC PLACE: WOULD NEVER DOZE
HOW LIKELY ARE YOU TO NOD OFF OR FALL ASLEEP WHILE SITTING AND READING: WOULD NEVER DOZE
HOW LIKELY ARE YOU TO NOD OFF OR FALL ASLEEP WHILE SITTING AND TALKING TO SOMEONE: WOULD NEVER DOZE
HOW LIKELY ARE YOU TO NOD OFF OR FALL ASLEEP WHILE SITTING QUIETLY AFTER LUNCH WITHOUT ALCOHOL: WOULD NEVER DOZE
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
## 2024-11-21 NOTE — NURSING NOTE
Scheduled Behavioral Sleep Medicine  with Dr. Franklin. Scheduled Sleep follow up. AVS and sleep dairy given to patient at clinic checkout. Writer explained sleep dairy to patient prior to seeing Behavioral Sleep Medicine  provider. Writer provided patient with Lab order information and directed patient to head to outpatient lab at clinic checkout.  Ramos Englandoo on 11/21/2024 at 10:33 AM
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
## 2024-11-21 NOTE — PATIENT INSTRUCTIONS
Insomnia - Restless Leg Syndrome (RLS)  Based on the information that you provided today you are likely to have restless leg syndrome that may be interfering with your sleep.  Treatment of restless leg syndrome usually depends on how much it is bothering you.  If it is a major problem then you might want to do something about it.  Here are some treatment options that you might want to consider:       Behavior Changes:     - Reduce or eliminate caffeine and alcohol products     - Increase the amount of stretching that you do, particularly before bedtime     - Sometimes a leg message can be helpful     - Some people find that a warm bath or shower in the evening is helpful       Medications:        - Neurontin            Behavioral Sleep Medicine Program    [facility],  provides non-drug treatment for sleep problems as part of our multi-discipline sleep medicine program. Services offered include:    Cognitive-behavioral Therapies for Insomnia (CBT-I)  Management of Shiftwork and Jet Lag Sleep Disorders  Management of Delayed, Advanced and Irregular Circadian Rhythm Sleep Disorders  Imagery Rehearsal Therapy (IRT) for Nightmare Disorder  Adaptation to PAP Therapy for Obstructive Sleep Apnea  Behavioral strategies to manage hypersomnia and fatigue    You have been referred for consultation with a sleep psychologist who specializes in behavioral sleep medicine and treatment of insomnia.  [facility] offers individualized telehealth services through our [facility] and online CBT-I.    Preparing for your Consultation    We recommend you keep a Sleep Diary for at least a week prior to your visit. Complete the sleep diary each day first thing after you get up by answering a few key questions about your sleep using our convenient mobile kianna or paper sleep diary.  Your answers should be based on your recall of the past  24 hours.  Avoid watching the clock or recording data during the night.     CBT-i  Shruthi    The CBT-i  mobile shruthi  is a convenient way to keep track of your sleep prior to your sleep consultation.  Simply download the free shruthi on your Apple or Android phone and record your information each morning.  The shruthi is an important tool that will be used if you proceed with CBT-I with your sleep provider. Prior to your consultation we recommend you use only the sleep diary function. You can e-mail yourself a copy of your sleep diary data by going to the Settings section and using the [place] User Data function.  During your consultation your provider will review the data with you.        CBT-I:  Frequently Asked Questions    What is CBT-I?    Cognitive Behavioral Therapy for Insomnia, also known as CBT-I, is a highly effective non-drug treatment for insomnia. The American College of Phys[employer] recommends CBT-I as the first treatment for chronic insomnia.  Research has shown CBT-I to be safer and more effective long term than sleeping pills.    What does CBT-I involve?    CBT-I targets behaviors that lead to chronic insomnia:  Habits that weaken the bed as a cue for sleep  Habits that weaken your body's sleep drive and sleep/wake clock   Unhelpful sleep thoughts that increase sleep-related worry and arousal.    The process involves 3-6 telehealth visits that guide you to implement proven strategies to get a better night's sleep.    People often see improvement in their sleep within a few weeks. Research shows if you keep practicing the skills you learn your sleep is likely to continue to improve 6-12 months after treatment.    Does this program prescribe or manage sleep medication?    No.  Your prescribing provider is responsible to assist you in managing your sleep medications.  Some people choose to stop using sleep medication prior to or during CBT-I.  Our program can work with your prescribing provider to help  reduce or eliminate use of sleep medications.     Getting Started Today!    We recommend you consider making the following changes to your sleep habits prior to your behavioral sleep medicine consultation if you have not done so already:     Reduce your caffeine and alcohol use.  Both can disrupt sleep and make strengthening your sleep more difficult.  Specifically:    - Avoid caffeine within 8 hours of your bedtime   - No more than 3 caffeinated beverages per day (e.g. 8 oz. cup coffee or 12 oz. soda)            - No alcohol within 3 hours of bedtime    Make sure your bedroom is quiet, comfortable, and dark.  Noise, light, and an uncomfortable sleep space can harm your sleep.     Get out of bed at the same time every day.    I  Self-help Workbooks for Insomnia    If you have found self-help books useful in the past, you may want to consider reading one of the following books prior to your consultation:    Say Claudia to Insomnia: The Six-Week, Drug-Free Program Developed at [facility].  Yahir Pérez MD. Available in paperback, Srinivas, and audiobook.    Overcoming Insomnia: A Cognitive-Behavioral Therapy Approach, Workbook.  Jose Moreland, PhD  and Love De Jesus, PhD.  Available in paperback and Srinivas.    Quiet Your Mind and Get to Sleep: Solutions to Insomnia for Those with Depression, Anxiety, or Chronic Pain.  Mady Bird, PhD and Love De Jesus, PhD.  Available in paperback and Srinivas

## 2024-12-03 ENCOUNTER — MYC MEDICAL ADVICE (OUTPATIENT)
Dept: CARDIOLOGY | Facility: CLINIC | Age: 34
End: 2024-12-03
Payer: COMMERCIAL

## 2024-12-03 DIAGNOSIS — R06.09 DOE (DYSPNEA ON EXERTION): ICD-10-CM

## 2024-12-03 DIAGNOSIS — R07.89 ATYPICAL CHEST PAIN: ICD-10-CM

## 2024-12-03 DIAGNOSIS — I10 ESSENTIAL HYPERTENSION: ICD-10-CM

## 2024-12-03 RX ORDER — TELMISARTAN 20 MG/1
20 TABLET ORAL DAILY
Qty: 90 TABLET | Refills: 0 | Status: SHIPPED | OUTPATIENT
Start: 2024-12-03

## 2024-12-03 NOTE — TELEPHONE ENCOUNTER
Started on Telmisartan. Rx sent in. Lab checked as recommended, within normal limits. SM sent to schedulers to call and arrange for 4 month follow-up. MCM to patient also.  JADERn

## 2024-12-09 DIAGNOSIS — I10 BENIGN ESSENTIAL HYPERTENSION: ICD-10-CM

## 2024-12-09 RX ORDER — AMLODIPINE BESYLATE 10 MG/1
10 TABLET ORAL DAILY
Qty: 90 TABLET | Refills: 2 | Status: SHIPPED | OUTPATIENT
Start: 2024-12-09

## 2024-12-11 ENCOUNTER — PATIENT OUTREACH (OUTPATIENT)
Dept: FAMILY MEDICINE | Facility: CLINIC | Age: 34
End: 2024-12-11
Payer: COMMERCIAL

## 2025-01-22 ENCOUNTER — OFFICE VISIT (OUTPATIENT)
Dept: FAMILY MEDICINE | Facility: CLINIC | Age: 35
End: 2025-01-22
Payer: COMMERCIAL

## 2025-01-22 VITALS
HEART RATE: 85 BPM | HEIGHT: 65 IN | BODY MASS INDEX: 27.99 KG/M2 | DIASTOLIC BLOOD PRESSURE: 70 MMHG | RESPIRATION RATE: 16 BRPM | SYSTOLIC BLOOD PRESSURE: 132 MMHG | WEIGHT: 168 LBS | OXYGEN SATURATION: 99 % | TEMPERATURE: 96.8 F

## 2025-01-22 DIAGNOSIS — D06.9 CIN III (CERVICAL INTRAEPITHELIAL NEOPLASIA GRADE III) WITH SEVERE DYSPLASIA: ICD-10-CM

## 2025-01-22 DIAGNOSIS — K21.9 GASTROESOPHAGEAL REFLUX DISEASE, UNSPECIFIED WHETHER ESOPHAGITIS PRESENT: ICD-10-CM

## 2025-01-22 DIAGNOSIS — Z12.4 CERVICAL CANCER SCREENING: ICD-10-CM

## 2025-01-22 DIAGNOSIS — G47.9 SLEEP TROUBLE: ICD-10-CM

## 2025-01-22 DIAGNOSIS — F32.2 SEVERE MAJOR DEPRESSION (H): ICD-10-CM

## 2025-01-22 DIAGNOSIS — Z00.00 PHYSICAL EXAM, ANNUAL: Primary | ICD-10-CM

## 2025-01-22 DIAGNOSIS — F41.9 ANXIETY: ICD-10-CM

## 2025-01-22 LAB
EST. AVERAGE GLUCOSE BLD GHB EST-MCNC: 103 MG/DL
HBA1C MFR BLD: 5.2 % (ref 0–5.6)

## 2025-01-22 PROCEDURE — G2211 COMPLEX E/M VISIT ADD ON: HCPCS | Performed by: PHYSICIAN ASSISTANT

## 2025-01-22 PROCEDURE — 36415 COLL VENOUS BLD VENIPUNCTURE: CPT | Performed by: PHYSICIAN ASSISTANT

## 2025-01-22 PROCEDURE — 83036 HEMOGLOBIN GLYCOSYLATED A1C: CPT | Performed by: PHYSICIAN ASSISTANT

## 2025-01-22 PROCEDURE — 99395 PREV VISIT EST AGE 18-39: CPT | Performed by: PHYSICIAN ASSISTANT

## 2025-01-22 PROCEDURE — 80061 LIPID PANEL: CPT | Performed by: PHYSICIAN ASSISTANT

## 2025-01-22 PROCEDURE — 80048 BASIC METABOLIC PNL TOTAL CA: CPT | Performed by: PHYSICIAN ASSISTANT

## 2025-01-22 PROCEDURE — 99214 OFFICE O/P EST MOD 30 MIN: CPT | Mod: 25 | Performed by: PHYSICIAN ASSISTANT

## 2025-01-22 RX ORDER — GABAPENTIN 100 MG/1
200 CAPSULE ORAL AT BEDTIME
Qty: 180 CAPSULE | Refills: 4 | Status: SHIPPED | OUTPATIENT
Start: 2025-01-22

## 2025-01-22 RX ORDER — PAROXETINE 10 MG/1
10 TABLET, FILM COATED ORAL EVERY MORNING
Qty: 90 TABLET | Refills: 4 | Status: SHIPPED | OUTPATIENT
Start: 2025-01-22

## 2025-01-22 RX ORDER — FAMOTIDINE 20 MG/1
20 TABLET, FILM COATED ORAL 2 TIMES DAILY
Qty: 60 TABLET | Refills: 11 | Status: SHIPPED | OUTPATIENT
Start: 2025-01-22

## 2025-01-22 SDOH — HEALTH STABILITY: PHYSICAL HEALTH: ON AVERAGE, HOW MANY DAYS PER WEEK DO YOU ENGAGE IN MODERATE TO STRENUOUS EXERCISE (LIKE A BRISK WALK)?: 4 DAYS

## 2025-01-22 ASSESSMENT — ASTHMA QUESTIONNAIRES
QUESTION_3 LAST FOUR WEEKS HOW OFTEN DID YOUR ASTHMA SYMPTOMS (WHEEZING, COUGHING, SHORTNESS OF BREATH, CHEST TIGHTNESS OR PAIN) WAKE YOU UP AT NIGHT OR EARLIER THAN USUAL IN THE MORNING: NOT AT ALL
QUESTION_4 LAST FOUR WEEKS HOW OFTEN HAVE YOU USED YOUR RESCUE INHALER OR NEBULIZER MEDICATION (SUCH AS ALBUTEROL): NOT AT ALL
ACT_TOTALSCORE: 25
ACT_TOTALSCORE: 25
QUESTION_1 LAST FOUR WEEKS HOW MUCH OF THE TIME DID YOUR ASTHMA KEEP YOU FROM GETTING AS MUCH DONE AT WORK, SCHOOL OR AT HOME: NONE OF THE TIME
QUESTION_5 LAST FOUR WEEKS HOW WOULD YOU RATE YOUR ASTHMA CONTROL: COMPLETELY CONTROLLED
QUESTION_2 LAST FOUR WEEKS HOW OFTEN HAVE YOU HAD SHORTNESS OF BREATH: NOT AT ALL

## 2025-01-22 ASSESSMENT — SOCIAL DETERMINANTS OF HEALTH (SDOH): HOW OFTEN DO YOU GET TOGETHER WITH FRIENDS OR RELATIVES?: ONCE A WEEK

## 2025-01-22 NOTE — PROGRESS NOTES
"Preventive Care Visit  [facility]  Anila Saleem PA-C, Family Medicine  Jan 22, 2025      Assessment & Plan     Physical exam, annual  - Lipid Profile (Chol, Trig, HDL, LDL calc); Future  - Hemoglobin A1c; Future  - Basic metabolic panel  (Ca, Cl, CO2, Creat, Gluc, K, Na, BUN); Future  - Lipid Profile (Chol, Trig, HDL, LDL calc)  - Hemoglobin A1c  - Basic metabolic panel  (Ca, Cl, CO2, Creat, Gluc, K, Na, BUN)    CLAYTON III (cervical intraepithelial neoplasia grade III) with severe dysplasia  -s/p hysterectomy with residual abnormal pap testing.  NIL with neg HRHPV in 2023, repeated today  -I will notify pt of results when available     Cervical cancer screening  - HPV and Gynecologic Cytology Panel - Recommended Age 30 - 65 Years    Anxiety  Major Depressive Disorder  -stable on Paxil  - PARoxetine (PAXIL) 10 MG tablet; Take 1 tablet (10 mg) by mouth every morning.    Sleep trouble  -gabapentin is helping.  Refills sent.    - gabapentin (NEURONTIN) 100 MG capsule; Take 2 capsules (200 mg) by mouth at bedtime. After 3 to 5 night an increase to 2 caps, then after 5 -7 night and increase 3    Gastroesophageal reflux disease, unspecified whether esophagitis present  - famotidine (PEPCID) 20 MG tablet; Take 1 tablet (20 mg) by mouth 2 times daily.      BMI  Estimated body mass index is 27.96 kg/m  as calculated from the following:    Height as of this encounter: 1.651 m (5' 5\").    Weight as of this encounter: 76.2 kg (168 lb).       Counseling  Appropriate preventive services were addressed with this patient via screening, questionnaire, or discussion as appropriate for fall prevention, nutrition, physical activity, Tobacco-use cessation, social engagement, weight loss and cognition.  Checklist reviewing preventive services available has been given to the patient.  Reviewed patient's diet, addressing concerns and/or questions.   She is at risk for psychosocial distress and has been " provided with information to reduce risk.           Markel Chowdary is a 34 year old, presenting for the following:  Physical        1/22/2025     1:53 PM   Additional Questions   Roomed by yvonne   Accompanied by self         1/22/2025   Forms   Any forms needing to be completed Yes          HPI    -h/o abnormal pap after hysterectomy.  Pap in 12/2023 was NIL with neg HRHPV, due to repeat cotesting today    -saw sleep medicine, using gabapentin for sleep which is helpful    -seeing GI, will have colonoscopy and EGD in a few weeks for ongoing GI sxs    Health Care Directive  Patient does not have a Health Care Directive: Discussed advance care planning with patient; however, patient declined at this time.      1/22/2025   General Health   How would you rate your overall physical health? (!) FAIR   Feel stress (tense, anxious, or unable to sleep) Rather much   (!) STRESS CONCERN      1/22/2025   Nutrition   Three or more servings of calcium each day? Yes   Diet: Low salt   How many servings of fruit and vegetables per day? (!) 2-3   How many sweetened beverages each day? 0-1         1/22/2025   Exercise   Days per week of moderate/strenous exercise 4 days         1/22/2025   Social Factors   Frequency of gathering with friends or relatives Once a week   Worry food won't last until get money to buy more No   Food not last or not have enough money for food? No   Do you have housing? (Housing is defined as stable permanent housing and does not include staying ouside in a car, in a tent, in an abandoned building, in an overnight shelter, or couch-surfing.) Yes   Are you worried about losing your housing? No   Lack of transportation? No   Unable to get utilities (heat,electricity)? No         1/22/2025   Dental   Dentist two times every year? Yes         1/14/2025   TB Screening   Were you born outside of the [place]? No         Today's PHQ-9 Score:       1/14/2025    11:12 AM   PHQ-9 SCORE   PHQ-9 Total Score MyChart 4  "(Minimal depression)   PHQ-9 Total Score 4        Patient-reported           1/22/2025   Substance Use   Alcohol more than 3/day or more than 7/wk No   Do you use any other substances recreationally? No     Social History     Tobacco Use    Smoking status: Former     Average packs/day: 1 pack/day for 15.0 years (15.0 ttl pk-yrs)     Types: Cigarettes     Start date: 2008     Passive exposure: Never    Smokeless tobacco: Never    Tobacco comments:     Lozenges daily    Vaping Use    Vaping status: Never Used   Substance Use Topics    Alcohol use: No    Drug use: Never          Mammogram Screening - Patient under 40 years of age: Routine Mammogram Screening not recommended.         1/22/2025   STI Screening   New sexual partner(s) since last STI/HIV test? No     History of abnormal Pap smear: Status post hysterectomy. Pap still indicated.         Latest Ref Rng & Units 12/28/2023    10:19 AM 11/3/2022     5:36 PM 4/26/2022    10:43 AM   PAP / HPV   PAP  Negative for Intraepithelial Lesion or Malignancy (NILM)  Negative for Intraepithelial Lesion or Malignancy (NILM)  Negative for Intraepithelial Lesion or Malignancy (NILM)    HPV 16 DNA Negative Negative  Negative  Positive    HPV 18 DNA Negative Negative  Negative  Negative    Other HR HPV Negative Negative  Positive  Negative           Reviewed and updated as needed this visit by Provider                       Objective    Exam  /70 (BP Location: Left arm, Patient Position: Sitting, Cuff Size: Adult Regular)   Pulse 85   Temp 96.8  F (36  C) (Temporal)   Resp 16   Ht 1.651 m (5' 5\")   Wt 76.2 kg (168 lb)   LMP  (LMP Unknown)   SpO2 99%   BMI 27.96 kg/m     Estimated body mass index is 27.96 kg/m  as calculated from the following:    Height as of this encounter: 1.651 m (5' 5\").    Weight as of this encounter: 76.2 kg (168 lb).    Physical Exam  GENERAL: alert and no distress  EYES: Eyes grossly normal to inspection, PERRL and conjunctivae and sclerae " normal  HENT: ear canals and TM's normal, nose and mouth without ulcers or lesions  NECK: no adenopathy, no asymmetry, masses, or scars  RESP: lungs clear to auscultation - no rales, rhonchi or wheezes  CV: regular rate and rhythm, normal S1 S2, no S3 or S4, no murmur, click or rub, no peripheral edema  ABDOMEN: soft, nontender, no hepatosplenomegaly, no masses and bowel sounds normal   (female): normal female external genitalia, normal urethral meatus, normal vaginal mucosa  MS: no gross musculoskeletal defects noted, no edema  SKIN: no suspicious lesions or rashes  NEURO: Normal strength and tone, mentation intact and speech normal  PSYCH: mentation appears normal, affect normal/bright    Prior to immunization administration, verified patients identity using patient s name and date of birth. Please see Immunization Activity for additional information.     Screening Questionnaire for Adult Immunization    Are you sick today?   No   Do you have allergies to medications, food, a vaccine component or latex?   No   Have you ever had a serious reaction after receiving a vaccination?   No   Do you have a long-term health problem with heart, lung, kidney, or metabolic disease (e.g., diabetes), asthma, a blood disorder, no spleen, complement component deficiency, a cochlear implant, or a spinal fluid leak?  Are you on long-term aspirin therapy?   No   Do you have cancer, leukemia, HIV/AIDS, or any other immune system problem?   No   Do you have a parent, brother, or sister with an immune system problem?   No   In the past 3 months, have you taken medications that affect  your immune system, such as prednisone, other steroids, or anticancer drugs; drugs for the treatment of rheumatoid arthritis, Crohn s disease, or psoriasis; or have you had radiation treatments?   No   Have you had a seizure, or a brain or other nervous system problem?   No   During the past year, have you received a transfusion of blood or blood     products, or been given immune (gamma) globulin or antiviral drug?   No   For women: Are you pregnant or is there a chance you could become       pregnant during the next month?   No   Have you received any vaccinations in the past 4 weeks?   No     Immunization questionnaire answers were all negative.      Patient instructed to remain in clinic for 15 minutes afterwards, and to report any adverse reactions.     Screening performed by Dawit Gann MA on 1/22/2025 at 1:59 PM.      Signed Electronically by: Anila Saleem PA-C

## 2025-01-23 LAB
ANION GAP SERPL CALCULATED.3IONS-SCNC: 11 MMOL/L (ref 7–15)
BUN SERPL-MCNC: 13.7 MG/DL (ref 6–20)
CALCIUM SERPL-MCNC: 9.7 MG/DL (ref 8.8–10.4)
CHLORIDE SERPL-SCNC: 101 MMOL/L (ref 98–107)
CHOLEST SERPL-MCNC: 191 MG/DL
CREAT SERPL-MCNC: 0.69 MG/DL (ref 0.51–0.95)
EGFRCR SERPLBLD CKD-EPI 2021: >90 ML/MIN/1.73M2
FASTING STATUS PATIENT QL REPORTED: ABNORMAL
FASTING STATUS PATIENT QL REPORTED: NORMAL
GLUCOSE SERPL-MCNC: 88 MG/DL (ref 70–99)
HCO3 SERPL-SCNC: 24 MMOL/L (ref 22–29)
HDLC SERPL-MCNC: 37 MG/DL
LDLC SERPL CALC-MCNC: 101 MG/DL
NONHDLC SERPL-MCNC: 154 MG/DL
POTASSIUM SERPL-SCNC: 4 MMOL/L (ref 3.4–5.3)
SODIUM SERPL-SCNC: 136 MMOL/L (ref 135–145)
TRIGL SERPL-MCNC: 266 MG/DL

## 2025-01-23 RX ORDER — FAMOTIDINE 20 MG/1
20 TABLET, FILM COATED ORAL 2 TIMES DAILY
Qty: 180 TABLET | OUTPATIENT
Start: 2025-01-23

## 2025-01-27 LAB
HPV HR 12 DNA CVX QL NAA+PROBE: NEGATIVE
HPV16 DNA CVX QL NAA+PROBE: NEGATIVE
HPV18 DNA CVX QL NAA+PROBE: NEGATIVE
HUMAN PAPILLOMA VIRUS FINAL DIAGNOSIS: NORMAL

## 2025-01-28 ENCOUNTER — PATIENT OUTREACH (OUTPATIENT)
Dept: FAMILY MEDICINE | Facility: CLINIC | Age: 35
End: 2025-01-28
Payer: COMMERCIAL

## 2025-01-28 LAB
BKR AP ASSOCIATED HPV REPORT: NORMAL
BKR LAB AP GYN ADEQUACY: NORMAL
BKR LAB AP GYN INTERPRETATION: NORMAL
BKR LAB AP PREVIOUS ABNL DX: NORMAL
BKR LAB AP PREVIOUS ABNORMAL: NORMAL
PATH REPORT.COMMENTS IMP SPEC: NORMAL
PATH REPORT.COMMENTS IMP SPEC: NORMAL
PATH REPORT.RELEVANT HX SPEC: NORMAL

## 2025-02-06 ENCOUNTER — TELEPHONE (OUTPATIENT)
Dept: FAMILY MEDICINE | Facility: CLINIC | Age: 35
End: 2025-02-06

## 2025-02-06 ENCOUNTER — VIRTUAL VISIT (OUTPATIENT)
Dept: SLEEP MEDICINE | Facility: CLINIC | Age: 35
End: 2025-02-06
Attending: INTERNAL MEDICINE
Payer: COMMERCIAL

## 2025-02-06 VITALS — WEIGHT: 165 LBS | BODY MASS INDEX: 26.52 KG/M2 | HEIGHT: 66 IN

## 2025-02-06 DIAGNOSIS — F51.5 NIGHTMARES ASSOCIATED WITH CHRONIC POST-TRAUMATIC STRESS DISORDER: ICD-10-CM

## 2025-02-06 DIAGNOSIS — F43.12 CHRONIC POST-TRAUMATIC STRESS DISORDER (PTSD): Primary | ICD-10-CM

## 2025-02-06 DIAGNOSIS — F33.9 RECURRENT MAJOR DEPRESSIVE DISORDER, REMISSION STATUS UNSPECIFIED: ICD-10-CM

## 2025-02-06 DIAGNOSIS — F43.12 NIGHTMARES ASSOCIATED WITH CHRONIC POST-TRAUMATIC STRESS DISORDER: ICD-10-CM

## 2025-02-06 DIAGNOSIS — F51.04 CHRONIC INSOMNIA: ICD-10-CM

## 2025-02-06 ASSESSMENT — SLEEP AND FATIGUE QUESTIONNAIRES
HOW LIKELY ARE YOU TO NOD OFF OR FALL ASLEEP WHILE WATCHING TV: WOULD NEVER DOZE
HOW LIKELY ARE YOU TO NOD OFF OR FALL ASLEEP IN A CAR, WHILE STOPPED FOR A FEW MINUTES IN TRAFFIC: WOULD NEVER DOZE
HOW LIKELY ARE YOU TO NOD OFF OR FALL ASLEEP WHEN YOU ARE A PASSENGER IN A CAR FOR AN HOUR WITHOUT A BREAK: WOULD NEVER DOZE
HOW LIKELY ARE YOU TO NOD OFF OR FALL ASLEEP WHILE LYING DOWN TO REST IN THE AFTERNOON WHEN CIRCUMSTANCES PERMIT: WOULD NEVER DOZE
HOW LIKELY ARE YOU TO NOD OFF OR FALL ASLEEP WHILE SITTING AND TALKING TO SOMEONE: WOULD NEVER DOZE
HOW LIKELY ARE YOU TO NOD OFF OR FALL ASLEEP WHILE SITTING INACTIVE IN A PUBLIC PLACE: WOULD NEVER DOZE
HOW LIKELY ARE YOU TO NOD OFF OR FALL ASLEEP WHILE SITTING AND READING: WOULD NEVER DOZE
HOW LIKELY ARE YOU TO NOD OFF OR FALL ASLEEP WHILE SITTING QUIETLY AFTER LUNCH WITHOUT ALCOHOL: WOULD NEVER DOZE

## 2025-02-06 ASSESSMENT — PAIN SCALES - GENERAL: PAINLEVEL_OUTOF10: NO PAIN (0)

## 2025-02-06 NOTE — NURSING NOTE
Current patient location: [facility] [address] [place] [address]    Is the patient currently in the state of [place]? YES    Visit mode: VIDEO    If the visit is dropped, the patient can be reconnected by:VIDEO VISIT: Text to cell phone:   Telephone Information:   Mobile [phone]       Will anyone else be joining the visit? NO  (If patient encounters technical issues they should call [phone]    Are changes needed to the allergy or medication list? No    Are refills needed on medications prescribed by this physician? NO    Rooming Documentation:  Questionnaire(s) completed    Reason for visit: Consult    Michele SHAHID

## 2025-02-06 NOTE — PATIENT INSTRUCTIONS
Download the free sleep kianna called CBTI .     Keep a sleep diary in the morning recalling how you slept the night before.  Your estimates should be your recollection.  Avoid noticing or monitoring the clock at night prior to our next visit.    We will review these data at our next appointment.

## 2025-02-06 NOTE — PROGRESS NOTES
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 presented as oriented X3 with speech and language intact.  The patient was cooperative throughout the evaluation with no signs of hallucinations, delusions, loosening of associations or other thought disturbance.  Mood was normal Affect was congruent with mood. Insight and judgement were intact.  Memory appeared intact for recent and remote elements.  There was no report of suicidal ideation, intention or plan. Attention and concentration were within normal.        Ricardo Franklin, Ellen, LP, DBSM  Diplomate, Behavioral Sleep Medicine  [facility]      Copy:   Anila Saleem MD  [address] [address]    Note: This dictation was created using voice recognition software. This document may contain an error not identified before finalizing the document. If the error changes the accuracy of the document, I would appreciate it being brought to my attention.

## 2025-02-06 NOTE — TELEPHONE ENCOUNTER
Test Results    Contacts       Contact Date/Time Type Contact Phone/Fax    02/06/2025 03:18 PM CST Phone (Incoming) Ricarda Echavarria (Self) [phone] (M)        Who ordered the test:  Anila Saleem    Type of test: Lab    Date of test:  01/22/2025    Where was the test performed:  [facility]    What are your questions/concerns?:  Patient had a specialty appt with sleep medicine on 02/06/25 and was told patient cholesterol level was high. Patient request to know if patient should be on any cholesterol medication? No, sure if patient was given lab result. Please advise.    Could we send this information to you in [employer] or would you prefer to receive a phone call?:   Patient would prefer a phone call   Okay to leave a detailed message?: Yes at Cell number on file:    Telephone Information:   Mobile [phone]

## 2025-02-08 ENCOUNTER — TELEPHONE (OUTPATIENT)
Dept: GASTROENTEROLOGY | Facility: CLINIC | Age: 35
End: 2025-02-08
Payer: COMMERCIAL

## 2025-02-08 NOTE — TELEPHONE ENCOUNTER
Pre visit planning completed.      Procedure details:    Patient scheduled for Colonoscopy/Upper endoscopy (EGD) on 2/21/25.     Arrival time: 0830. Procedure time 0930    Facility location: [facility]; [address] [place] [address], [address] Floor, [place] [address]. Check in location: 5th Floor.    Sedation type: MAC    Pre op exam needed? No.    Indication for procedure:     epigastric pain, heartburn/chest pain despite PPI, nausea, intermitient dysphagia         Chart review:     Electronic implanted devices? No    Recent diagnosis of diverticulitis within the last 6 weeks? No      Medication review:    Diabetic? No    Anticoagulants? No    Weight loss medication/injectable? No GLP-1 medication per patient's medication list. Nursing to verify with pre-assessment call.    Other medication HOLDING recommendations:  EGD: PPIs/Acid reducing medication(s): HOLD 2 weeks before procedure. Due to ordering provider request. Office visit 10/7/24.       Prep for procedure:     Bowel prep recommendation: Standard Miralax.   Due to: standard bowel prep    Procedure information and instructions sent via [employer]         Sugey Obrien RN  Endoscopy Procedure Pre Assessment   [phone] option 2

## 2025-02-10 NOTE — TELEPHONE ENCOUNTER
Pre assessment completed for upcoming procedure.      Procedure details:    Patient scheduled for Colonoscopy/Upper endoscopy (EGD) on 2/21/25.     Arrival time: 0830. Procedure time 0930     Facility location: [facility]; [address] [place] [address], 5[address] Floor, [place] [address]. Check in location: 5th Floor.     Sedation type: MAC     Pre op exam needed? No.     Indication for procedure:     epigastric pain, heartburn/chest pain despite PPI, nausea, intermitient dysphagia       Designated  policy reviewed. Instructed to have someone stay 24 hours post procedure.       Chart review:     Electronic implanted devices? No    Recent diagnosis of diverticulitis within the last 6 weeks?  No      Medication review:    Diabetic? No    Anticoagulants? No    Weight loss medication/injectable? No.    Other medication HOLDING recommendations:  N/A      Prep for procedure:     Bowel prep recommendation: Standard Miralax.     Reviewed procedure prep instructions.     Patient verbalized understanding and had no questions or concerns at this time.        Maricruz Peterson LPN  Endoscopy Procedure Pre Assessment   [phone] option 2

## 2025-02-11 ENCOUNTER — TELEPHONE (OUTPATIENT)
Dept: SLEEP MEDICINE | Facility: CLINIC | Age: 35
End: 2025-02-11

## 2025-02-11 NOTE — TELEPHONE ENCOUNTER
Patient needs to be rescheduled for their virtual visit due to Reason for Reschedule: Patient Request pt requested to cancel appt. she is at work and unable to complete visit.     Scheduling team, please refer to service line late cancellation/no-show policies and reach out to patient at a later date for rescheduling.    Appointment mode: Video  Provider: Ricardo Franklin

## 2025-02-12 NOTE — TELEPHONE ENCOUNTER
Called patient back and relayed message below  No further action needed    Sheri WOOD [facility]      Hi Ricarda ,     Here are your recent results.     Your labs look ok.     Cholesterol looks ok.  One kind of cholesterol, the triglycerides are high. Lowering  the amount of sugar ,alcohol and sweets in the diet helps to control this.Exercise and weight loss helps.  They are not high enough to consider therapy with medicine.     Your kidney function and electrolytes are normal     You are not diabetic.          Please let me know if you have any questions or concerns.     Take care!  Anila   Written by Anila Saleem PA-C on 1/24/2025  4:35 PM CST  Seen by patient Ricarda Echavarria on 1/24/2025  5:56 PM

## 2025-02-16 NOTE — PROGRESS NOTES
Pre-procedure note:    34 year old year old female with a PMH of HTN, tobacco use disorder (quit 2023), asthma, migraine, depression & MADDISON, who was referred for EGD/colonoscopy (esophageal and colon Bx) for evaluation of chronic symptoms of epigastric pain, abdominal cramps,intermittent dysphagia, irregular bowel habits and intermittent diarrhea. CT A/P in 6/2024 noted mild wall thickening of descending colon favoring under distention rather than colitis. Colonoscopy in 9/2020 was unremarkable with negative biopsy for IBD and microscopic colitis. Negative H.pylori stool Ag in 7/2024 and negative celiac serology in 10/2024.     Ref: Elaina Cullen PA-C

## 2025-02-21 ENCOUNTER — HOSPITAL ENCOUNTER (OUTPATIENT)
Facility: AMBULATORY SURGERY CENTER | Age: 35
Discharge: HOME | End: 2025-02-21
Attending: INTERNAL MEDICINE | Admitting: INTERNAL MEDICINE
Payer: COMMERCIAL

## 2025-02-21 VITALS
TEMPERATURE: 97 F | HEIGHT: 66 IN | SYSTOLIC BLOOD PRESSURE: 100 MMHG | DIASTOLIC BLOOD PRESSURE: 55 MMHG | OXYGEN SATURATION: 98 % | WEIGHT: 165 LBS | BODY MASS INDEX: 26.52 KG/M2 | RESPIRATION RATE: 16 BRPM

## 2025-02-21 LAB
COLONOSCOPY: NORMAL
UPPER GI ENDOSCOPY: NORMAL

## 2025-02-21 PROCEDURE — 43239 EGD BIOPSY SINGLE/MULTIPLE: CPT | Performed by: INTERNAL MEDICINE

## 2025-02-21 PROCEDURE — 45380 COLONOSCOPY AND BIOPSY: CPT | Performed by: INTERNAL MEDICINE

## 2025-02-21 PROCEDURE — 88305 TISSUE EXAM BY PATHOLOGIST: CPT | Mod: TC | Performed by: INTERNAL MEDICINE

## 2025-02-21 PROCEDURE — 88305 TISSUE EXAM BY PATHOLOGIST: CPT | Mod: 26 | Performed by: PATHOLOGY

## 2025-02-21 RX ORDER — PROCHLORPERAZINE MALEATE 10 MG
10 TABLET ORAL EVERY 6 HOURS PRN
Status: DISCONTINUED | OUTPATIENT
Start: 2025-02-21 | End: 2025-02-22 | Stop reason: HOSPADM

## 2025-02-21 RX ORDER — FLUMAZENIL 0.1 MG/ML
0.2 INJECTION, SOLUTION INTRAVENOUS
Status: ACTIVE | OUTPATIENT
Start: 2025-02-21 | End: 2025-02-21

## 2025-02-21 RX ORDER — ONDANSETRON 2 MG/ML
4 INJECTION INTRAMUSCULAR; INTRAVENOUS
Status: DISCONTINUED | OUTPATIENT
Start: 2025-02-21 | End: 2025-02-21 | Stop reason: HOSPADM

## 2025-02-21 RX ORDER — NALOXONE HYDROCHLORIDE 0.4 MG/ML
0.4 INJECTION, SOLUTION INTRAMUSCULAR; INTRAVENOUS; SUBCUTANEOUS
Status: DISCONTINUED | OUTPATIENT
Start: 2025-02-21 | End: 2025-02-22 | Stop reason: HOSPADM

## 2025-02-21 RX ORDER — ONDANSETRON 4 MG/1
4 TABLET, ORALLY DISINTEGRATING ORAL EVERY 6 HOURS PRN
Status: DISCONTINUED | OUTPATIENT
Start: 2025-02-21 | End: 2025-02-22 | Stop reason: HOSPADM

## 2025-02-21 RX ORDER — NALOXONE HYDROCHLORIDE 0.4 MG/ML
0.2 INJECTION, SOLUTION INTRAMUSCULAR; INTRAVENOUS; SUBCUTANEOUS
Status: DISCONTINUED | OUTPATIENT
Start: 2025-02-21 | End: 2025-02-22 | Stop reason: HOSPADM

## 2025-02-21 RX ORDER — ONDANSETRON 2 MG/ML
4 INJECTION INTRAMUSCULAR; INTRAVENOUS EVERY 6 HOURS PRN
Status: DISCONTINUED | OUTPATIENT
Start: 2025-02-21 | End: 2025-02-22 | Stop reason: HOSPADM

## 2025-02-21 RX ORDER — LIDOCAINE 40 MG/G
CREAM TOPICAL
Status: DISCONTINUED | OUTPATIENT
Start: 2025-02-21 | End: 2025-02-21 | Stop reason: HOSPADM
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
## 2025-02-21 NOTE — DISCHARGE INSTRUCTIONS
Discharge Instructions after Colonoscopy      Today you had a ____ Colonoscopy     Activity and Diet  You were given medicine for pain. You may be dizzy or sleepy.  For 24 hours:   Do not drive or use heavy equipment.   Do not make important decisions.   Do not drink any alcohol.  You may return to your normal diet and medicines.    Discomfort   Air was placed in your colon during the exam in order to see it. Walking helps to pass the air.   You may take Tylenol (acetaminophen) for pain unless your doctor has told you not to.  Do not take aspirin or ibuprofen (Advil, Motrin, or other anti-inflammatory  drugs) for _____ days.    Follow-up  ____ We took small tissue samples or polyps to study. Your doctor will call you with the results  within two weeks.    When to call:    Call right away if you have:   Unusual pain in belly or chest pain not relieved with passing air.   More than 1 to 2 Tablespoons of bleeding from your rectum.   Fever above 100.6  F (37.5  C).    If you have severe pain, bleeding, or shortness of breath, go to an emergency room.    If you have questions, call:  Monday to Friday, 8 a.m. to 4:30 p.m.  Central Scheduling Department: [phone]    After hours  Hospital: [phone] (Ask for the GI fellow on call)    Discharge Instructions after  Upper Endoscopy (EGD)    Activity and Diet  You were given medicine for pain. You may be dizzy or sleepy.  For 24 hours:   Do not drive or use heavy equipment.   Do not make important decisions.   Do not drink any alcohol.  ___ You may return to your regular diet.    Discomfort  You may have a sore throat for 2 to 3 days. It may help to:   Avoid hot liquids for 24 hours.   Use sore throat lozenges.   Gargle as needed with salt water up to 4 times a day. Mix 1 cup of warm water  with 1 teaspoon of salt. Do not swallow.  ___ Your esophagus was dilated (opened) or banded during the exam:   Drink only cool liquids for the rest of the day. Eat a soft diet for the  next few days.   You may have a sore chest for 2 to 3 days.    You may take Tylenol (acetaminophen) for pain unless your doctor has told you not to.    Do not take aspirin or ibuprofen (Advil, Motrin) or other NSAIDS  (anti-inflammatory drugs) for ___ days.    Follow-up  ___ We took small tissue samples for study. If you do not have a follow-up visit scheduled,  call your provider s office in 2 weeks for the results.    Other instructions________________________________________________________    When to call us:  Problems are rare. Call right away if you have:   Unusual throat pain or trouble swallowing   Unusual pain in belly or chest that is not relieved by belching or passing air   Black stools (tar-like looking bowel movement)   Temperature above 100.6  F. (37.5  C).    If you vomit blood or have severe pain, go to an emergency room.    If you have questions, call:  Monday to Friday, 8 a.m. to 4:30 p.m.: Central Scheduling Department:[phone]    After hours: [facility]: [phone] (Ask for the GI fellow on call)

## 2025-02-21 NOTE — LETTER
February 27, 2025      Ricarda M Jericho  [address] [place] [address]        Dear ,    The biopsies from the esophagus and  the colon were normal and showed no evidence of inflammatory changes, which is good news. Please follow with your provider for further evaluation as we discussed previously.     Resulted Orders   Surgical Pathology Exam   Result Value Ref Range    Case Report       Surgical Pathology Report                         Case: [id number]                                  Authorizing Provider:  Andrew Colon MD      Collected:           02/21/2025 09:26 AM          Ordering Location:     [facility] OR  Received:            02/21/2025 10:06 AM                                 [facility]                                                                  Pathologist:           Mae Waldrop MD                                                           Specimens:   A) - Esophagus, Distal, Distal esophagus biopsy                                                     B) - Esophagus, Proximal, Proximal esophagus biopsy                                                 C) - Large Intestine, Colon, Right colon biopsy                                                     D) - Large Intestine, Colon, Left colon biopsy                                             Final Diagnosis       A. DISTAL ESOPHAGUS, BIOPSY:  Squamous esophageal mucosa with no intraepithelial eosinophils or other abnormality     B. PROXIMAL ESOPHAGUS, BIOPSY:  Squamous esophageal mucosa with no intraepithelial eosinophils or other abnormality     C. RIGHT COLON, RANDOM BIOPSY:   Colonic mucosa with no evidence of microscopic or chronic colitis or other significant histologic abnormality    D. LEFT COLON, RANDOM BIOPSY:  Colonic mucosa with no evidence of microscopic or chronic colitis or other significant histologic abnormality       Clinical Information       Pre-op Diagnosis: Flatulence, eructation and gas pain;  "generalized abdominal pain; irregular bowel habits; dysphagia; epigastric pain; nausea; abnormal CT of the abdomen      Gross Description       A(1). Esophagus, Distal, Distal esophagus biopsy:  The specimen is received in formalin with proper patient identification, labeled \"distal esophageal biopsy\".  The specimen consists of 5 gray-white to pink, irregular fragments of mucosal tissue, ranging from 0.2-0.6 cm in greatest dimension.  The specimen is wrapped, submitted in toto as A1.  B(2). Esophagus, Proximal, Proximal esophagus biopsy:  The specimen is received in formalin with proper patient identification, labeled \"proximal esophageal biopsy\".  The specimen consists of 5 Gy-white to pink, semi-translucent, irregular fragments of mucosal tissue, ranging from 0.1-0.5 cm in greatest dimension.  The specimen is wrapped, submitted in toto as B1.  C(3). Large Intestine, Colon, Right colon biopsy:  The specimen is received in formalin with proper patient identification, labeled \"large intestine, right colon biopsy\".  The specimen consists of 6 gray-tan, irregular fragments of mucosal tissue, ranging from 0.4-0.9 cm in greatest dimension.  The specimen is submitted in toto as C1.  D(4). Large Intestine, Colon, Left colon biopsy:  The specimen is received in formalin with proper patient identification, labeled \"large intestine, left colon biopsy\".  The specimen consists of 6 gray-tan, irregular fragments of mucosal tissue, ranging from 0.2-0.5 cm in greatest dimension.  The specimen is submitted in toto as D1      Microscopic Description       Microscopic examination has been performed.      I have personally reviewed all specimens and or slides, including the listed special stains, and used them with my medical judgement to determine the final diagnosis.       Performing Labs       The technical component of this testing was completed at [facility].    Stain " controls for all stains resulted within this report have been reviewed and show appropriate reactivity.       Case Images         If you have any questions or concerns, please call the clinic at the number listed above.       Sincerely,      Andrew Colon MD    Electronically signed

## 2025-02-21 NOTE — LETTER
February 27, 2025      Ricarda Echavarria  [address] [place] [address]        Dear ,    We are writing to inform you of your test results.    {results letter list:[id number]}    Resulted Orders   Surgical Pathology Exam   Result Value Ref Range    Case Report       Surgical Pathology Report                         Case: [id number]                                  Authorizing Provider:  Andrew Colon MD      Collected:           02/21/2025 09:26 AM          Ordering Location:     [facility] OR  Received:            02/21/2025 10:06 AM                                 [place]                                                                  Pathologist:           Mae Waldrop MD                                                           Specimens:   A) - Esophagus, Distal, Distal esophagus biopsy                                                     B) - Esophagus, Proximal, Proximal esophagus biopsy                                                 C) - Large Intestine, Colon, Right colon biopsy                                                     D) - Large Intestine, Colon, Left colon biopsy                                             Final Diagnosis       A. DISTAL ESOPHAGUS, BIOPSY:  Squamous esophageal mucosa with no intraepithelial eosinophils or other abnormality     B. PROXIMAL ESOPHAGUS, BIOPSY:  Squamous esophageal mucosa with no intraepithelial eosinophils or other abnormality     C. RIGHT COLON, RANDOM BIOPSY:   Colonic mucosa with no evidence of microscopic or chronic colitis or other significant histologic abnormality    D. LEFT COLON, RANDOM BIOPSY:  Colonic mucosa with no evidence of microscopic or chronic colitis or other significant histologic abnormality       Clinical Information       Pre-op Diagnosis: Flatulence, eructation and gas pain; generalized abdominal pain; irregular bowel habits; dysphagia; epigastric pain; nausea; abnormal CT of the abdomen      Gross  "Description       A(1). Esophagus, Distal, Distal esophagus biopsy:  The specimen is received in formalin with proper patient identification, labeled \"distal esophageal biopsy\".  The specimen consists of 5 gray-white to pink, irregular fragments of mucosal tissue, ranging from 0.2-0.6 cm in greatest dimension.  The specimen is wrapped, submitted in toto as A1.  B(2). Esophagus, Proximal, Proximal esophagus biopsy:  The specimen is received in formalin with proper patient identification, labeled \"proximal esophageal biopsy\".  The specimen consists of 5 Gy-white to pink, semi-translucent, irregular fragments of mucosal tissue, ranging from 0.1-0.5 cm in greatest dimension.  The specimen is wrapped, submitted in toto as B1.  C(3). Large Intestine, Colon, Right colon biopsy:  The specimen is received in formalin with proper patient identification, labeled \"large intestine, right colon biopsy\".  The specimen consists of 6 gray-tan, irregular fragments of mucosal tissue, ranging from 0.4-0.9 cm in greatest dimension.  The specimen is submitted in toto as C1.  D(4). Large Intestine, Colon, Left colon biopsy:  The specimen is received in formalin with proper patient identification, labeled \"large intestine, left colon biopsy\".  The specimen consists of 6 gray-tan, irregular fragments of mucosal tissue, ranging from 0.2-0.5 cm in greatest dimension.  The specimen is submitted in toto as D1      Microscopic Description       Microscopic examination has been performed.      I have personally reviewed all specimens and or slides, including the listed special stains, and used them with my medical judgement to determine the final diagnosis.       Performing Labs       The technical component of this testing was completed at [facility].    Stain controls for all stains resulted within this report have been reviewed and show appropriate reactivity.       Case Images   "       If you have any questions or concerns, please call the clinic at the number listed above.       Sincerely,          Electronically signed

## 2025-02-24 LAB
PATH REPORT.COMMENTS IMP SPEC: NORMAL
PATH REPORT.COMMENTS IMP SPEC: NORMAL
PATH REPORT.FINAL DX SPEC: NORMAL
PATH REPORT.GROSS SPEC: NORMAL
PATH REPORT.MICROSCOPIC SPEC OTHER STN: NORMAL
PATH REPORT.RELEVANT HX SPEC: NORMAL
PHOTO IMAGE: NORMAL

## 2025-03-03 ENCOUNTER — VIRTUAL VISIT (OUTPATIENT)
Dept: GASTROENTEROLOGY | Facility: CLINIC | Age: 35
End: 2025-03-03
Attending: DIETITIAN, REGISTERED
Payer: COMMERCIAL

## 2025-03-03 DIAGNOSIS — I10 ESSENTIAL HYPERTENSION: ICD-10-CM

## 2025-03-03 DIAGNOSIS — R13.10 DYSPHAGIA, UNSPECIFIED TYPE: Primary | ICD-10-CM

## 2025-03-03 DIAGNOSIS — R06.09 DOE (DYSPNEA ON EXERTION): ICD-10-CM

## 2025-03-03 DIAGNOSIS — R07.9 CHEST PAIN, UNSPECIFIED TYPE: ICD-10-CM

## 2025-03-03 DIAGNOSIS — R07.89 ATYPICAL CHEST PAIN: ICD-10-CM

## 2025-03-03 PROCEDURE — 98007 SYNCH AUDIO-VIDEO EST HI 40: CPT | Performed by: DIETITIAN, REGISTERED

## 2025-03-03 PROCEDURE — 1126F AMNT PAIN NOTED NONE PRSNT: CPT | Mod: 95 | Performed by: DIETITIAN, REGISTERED

## 2025-03-03 RX ORDER — TELMISARTAN 20 MG/1
20 TABLET ORAL DAILY
Qty: 90 TABLET | Refills: 0 | Status: SHIPPED | OUTPATIENT
Start: 2025-03-03
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
## 2025-03-03 NOTE — NURSING NOTE
Current patient location: [facility] [address] [place] [address]    Is the patient currently in the state of [place]? YES    Visit mode: VIDEO    If the visit is dropped, the patient can be reconnected by:VIDEO VISIT: Text to cell phone:   Telephone Information:   Mobile [phone]       Will anyone else be joining the visit? NO  (If patient encounters technical issues they should call [phone])    Are changes needed to the allergy or medication list? No    Are refills needed on medications prescribed by this physician? NO    Rooming Documentation:  Questionnaire(s) completed    Reason for visit: RECHECK    Ganesh SHAHID
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
## 2025-03-03 NOTE — PATIENT INSTRUCTIONS
It was a pleasure meeting with you today and discussing your healthcare plan. Below is a summary of what we covered:    -- Esophogram to assess trouble swallowing and chest pain  -- Esophageal manometry to test for esophageal motility  -- Ok to use famotidine, can use as needed up to twice daily for burning pain  -- Start Miralax - start with one capful, you can increase to 2-3 capfuls per day if needed with goal of feeling more fully evacuated.   --------- After about 2 weeks of Miralax you can add in some soluble fiber powder to help bulk up stools if your stools are loose.  I did suggest starting 1/2 Tbs per day, depending on your stool consistency you can gradually increase this to 1 to 2 tablespoons/day over the course of few weeks .  Examples of soluble fiber powder are Metamucil (psyllium), Citrucel (methylcellulose), Benefiber (wheat dextran), SunFiber (guar gum).   -- Referral to [facility]. Our team will fax a referral for you.  If you do not hear from them within the next few business days, you may call : [phone].  They are located in [place]:  [address] [place] [address], [address] [place] [address]  Here is their website: [email or web address]     -- Celiac lab work pending  -- Follow up with cardiology and primary care      Please see below for any additional questions and scheduling guidelines.    Sign up for [employer]: [employer] patient portal serves as a secure platform for accessing your medical records from the [facility]. Additionally, [employer] facilitates easy, timely, and secure messaging with your care team. If you have not signed up, you may do so by using the provided code or calling [phone].    Coordinating your care after your visit:  There are multiple options for scheduling your follow-up care based on your provider's recommendation.    How do I schedule a follow-up clinic appointment:   After your appointment, you may receive scheduling assistance with the  Clinic Coordinators by having a seat in the waiting room and a Clinic Coordinator will call you up to schedule.  Virtual visits or after you leave the clinic:  Your provider has placed a follow-up order in the [employer] portal for scheduling your return appointment. A member of the scheduling team will contact you to schedule.  [employer] Scheduling: Timely scheduling through [employer] is advised to ensure appointment availability.   Call to schedule: You may schedule your follow-up appointment(s) by calling [phone], option 1.    How do I schedule my endoscopy or colonoscopy procedure:  If a procedure, such as a colonoscopy or upper endoscopy was ordered by your provider, the scheduling team will contact you to schedule this procedure. Or you may choose to call to schedule at   [phone], option 2.  Please allow 20-30 minutes when scheduling a procedure.    How do I get my blood work done? To get your blood work done, you need to schedule a lab appointment at an [facility]. There are multiple ways to schedule:   At the clinic: The Clinic Coordinator you meet after your visit can help you schedule a lab appointment.   [facility] scheduling: [employer] offers online lab scheduling at all [facility] laboratory locations.   Call to schedule: You can call [phone] to schedule your lab appointment.    How do I schedule my imaging study: To schedule imaging studies, such as CT scans, ultrasounds, MRIs, or X-rays, contact Imaging Services at [phone].    How do I schedule a referral to another doctor: If your provider recommended a referral to another specialist(s), the referral order was placed by your provider. You will receive a phone call to schedule this referral, or you may choose to call the number attached to the referral to self-schedule.    For Post-Visit Question(s):  For any inquiries following today's visit:  Please utilize [employer] messaging and allow 48 hours for reply or contact  the Call Center during normal business hours at [phone], option 3.  For Emergent After-hours questions, contact the On-Call GI Fellow through the [facility]  at [phone].  In addition, you may contact your Nurse directly using the provided contact information.    Test Results:  Test results will be accessible via [employer] in compliance with the 21st Century Cures Act. This means that your results will be available to you at the same time as your provider. Often you may see your results before your provider does. Results are reviewed by staff within two weeks with communication follow-up. Results may be released in the patient portal prior to your care team review.    Prescription Refill(s):  Medication prescribed by your provider will be addressed during your visit. For future refills, please coordinate with your pharmacy. If you have not had a recent clinic visit or routine labs, for your safety, your provider may not be able to refill your prescription.

## 2025-03-04 ENCOUNTER — TELEPHONE (OUTPATIENT)
Dept: GASTROENTEROLOGY | Facility: CLINIC | Age: 35
End: 2025-03-04
Payer: COMMERCIAL

## 2025-03-04 NOTE — TELEPHONE ENCOUNTER
----- Message from Elaina Cullen sent at 3/3/2025  9:48 AM CST -----  Regarding: Pelvic Floor referral  Hey team,    Can we get Ricarda referred to the pelvic floor center?    Thanks!  Elaina

## 2025-03-10 ENCOUNTER — MEDICAL CORRESPONDENCE (OUTPATIENT)
Dept: HEALTH INFORMATION MANAGEMENT | Facility: CLINIC | Age: 35
End: 2025-03-10
Payer: COMMERCIAL

## 2025-03-11 ENCOUNTER — OFFICE VISIT (OUTPATIENT)
Dept: CARDIOLOGY | Facility: CLINIC | Age: 35
End: 2025-03-11
Payer: COMMERCIAL

## 2025-03-11 ENCOUNTER — ORDERS ONLY (AUTO-RELEASED) (OUTPATIENT)
Dept: CARDIOLOGY | Facility: CLINIC | Age: 35
End: 2025-03-11

## 2025-03-11 VITALS
OXYGEN SATURATION: 99 % | DIASTOLIC BLOOD PRESSURE: 77 MMHG | WEIGHT: 168 LBS | SYSTOLIC BLOOD PRESSURE: 128 MMHG | HEART RATE: 74 BPM | BODY MASS INDEX: 27.12 KG/M2

## 2025-03-11 DIAGNOSIS — R07.89 ATYPICAL CHEST PAIN: ICD-10-CM

## 2025-03-11 DIAGNOSIS — R00.2 PALPITATIONS: ICD-10-CM

## 2025-03-11 DIAGNOSIS — I10 ESSENTIAL HYPERTENSION: ICD-10-CM

## 2025-03-11 DIAGNOSIS — R06.09 DOE (DYSPNEA ON EXERTION): ICD-10-CM

## 2025-03-11 DIAGNOSIS — R00.2 PALPITATIONS: Primary | ICD-10-CM

## 2025-03-11 PROCEDURE — G2211 COMPLEX E/M VISIT ADD ON: HCPCS | Performed by: STUDENT IN AN ORGANIZED HEALTH CARE EDUCATION/TRAINING PROGRAM

## 2025-03-11 PROCEDURE — 3078F DIAST BP <80 MM HG: CPT | Performed by: STUDENT IN AN ORGANIZED HEALTH CARE EDUCATION/TRAINING PROGRAM

## 2025-03-11 PROCEDURE — 99214 OFFICE O/P EST MOD 30 MIN: CPT | Performed by: STUDENT IN AN ORGANIZED HEALTH CARE EDUCATION/TRAINING PROGRAM

## 2025-03-11 PROCEDURE — 3074F SYST BP LT 130 MM HG: CPT | Performed by: STUDENT IN AN ORGANIZED HEALTH CARE EDUCATION/TRAINING PROGRAM

## 2025-03-11 RX ORDER — TELMISARTAN 20 MG/1
20 TABLET ORAL DAILY
Qty: 90 TABLET | Refills: 3 | Status: SHIPPED | OUTPATIENT
Start: 2025-03-11

## 2025-03-11 NOTE — LETTER
3/11/2025    Anila Saleem PA-C  [address] [place] [address]    RE: Ricrada Echavarria       Dear Colleague,     I had the pleasure of seeing Ricarda Echavarria in the [facility].    [facility]   [address]  [place] [address]   [email or web address]   OFFICE: [phone]     CARDIOLOGY CLINIC NOTE     Thank you, Dr. Saleem, Anila Mccord, for asking the [facility] team to see Ms. Ricarda Echavarria to evaluate Follow Up          History of Present Illness   Ms. Ricarda Echavarria is a 35 year old female with a significant past history of HTN, former smoker, who presents for follow up.  Testing so far has been normal.  She notes continued L sided chest pain while taking a deep breath.  This takes a few breaths before working its way out.  Associated with severe pain.  Other than this she has episodes of palpitations described as a rapid heart beat.  These can be momentary.  She also notes one episode overnight where she woke up sweaty, heart racing, short of breath.  She does feel her dyspnea is improving.  She is getting more exercise and her tolerance is better.             Medications  Allergies   Current Outpatient Medications   Medication Sig Dispense Refill     amLODIPine (NORVASC) 10 MG tablet TAKE 1 TABLET(10 MG) BY MOUTH DAILY 90 tablet 2     famotidine (PEPCID) 20 MG tablet Take 1 tablet (20 mg) by mouth 2 times daily. 60 tablet 11     ibuprofen (ADVIL/MOTRIN) 200 MG tablet Take 200 mg by mouth as needed       omeprazole (PRILOSEC) 40 MG DR capsule Take 1 capsule (40 mg) by mouth daily. 30 capsule 3     PARoxetine (PAXIL) 10 MG tablet Take 1 tablet (10 mg) by mouth every morning. 90 tablet 4     telmisartan (MICARDIS) 20 MG tablet Take 1 tablet (20 mg) by mouth daily. 90 tablet 3     TYLENOL 325 MG OR TABS Take 650 mg by mouth as needed for headaches.  0     diphenhydrAMINE-acetaminophen (TYLENOL PM)  MG tablet Take 1  tablet by mouth nightly as needed for sleep. At bed time (Patient not taking: Reported on 3/11/2025)        Allergies   Allergen Reactions     Zofran [Ondansetron] Headache        Physical Examination Review of Systems   Vitals: /77 (BP Location: Right arm, Patient Position: Sitting, Cuff Size: Adult Regular)   Pulse 74   Wt 76.2 kg (168 lb)   LMP  (LMP Unknown)   SpO2 99%   BMI 27.12 kg/m    BMI= Body mass index is 27.12 kg/m .  Wt Readings from Last 3 Encounters:   03/11/25 76.2 kg (168 lb)   02/28/25 73.4 kg (161 lb 12.8 oz)   02/21/25 74.8 kg (165 lb)       General: pleasant female. No acute distress.   Neck: No JVD  Lungs: clear to auscultation  COR:  regular rate and rhythm, No murmurs, rubs, or gallops  Extrem: No edema        Please refer above for cardiac ROS details.       Past History     Family History:   Family History   Problem Relation Age of Onset     Migraines Mother      Asthma Mother      Heart Disease Father      Asthma Sister      Substance Abuse Sister      Asthma Brother      Asthma Maternal Grandmother      Diabetes Type 2  Maternal Grandmother      Asthma Maternal Grandfather      Alcoholism Maternal Grandfather      Arthritis Maternal Grandfather      Clotting Disorder Maternal Grandfather      Depression Maternal Grandfather      Diabetes Maternal Grandfather      Substance Abuse Maternal Grandfather      Asthma Daughter      Asthma Son      Cancer Maternal Uncle         Social History:   Social History     Socioeconomic History     Marital status:      Spouse name: Not on file     Number of children: Not on file     Years of education: Not on file     Highest education level: Not on file   Occupational History     Not on file   Tobacco Use     Smoking status: Former     Average packs/day: 1 pack/day for 15.0 years (15.0 ttl pk-yrs)     Types: Cigarettes     Start date: 2008     Passive exposure: Never     Smokeless tobacco: Never     Tobacco comments:     Lozenges daily     Vaping Use     Vaping status: Never Used   Substance and Sexual Activity     Alcohol use: No     Drug use: Never     Sexual activity: Yes     Partners: Male     Birth control/protection: None   Other Topics Concern     Not on file   Social History Narrative    Lives with , 3 children        Social Drivers of Health     Financial Resource Strain: Low Risk  (2/1/2025)    Received from [facility]    Financial Resource Strain      Difficulty of Paying Living Expenses: 3      Difficulty of Paying Living Expenses: Not on file   Food Insecurity: Low Risk  (1/22/2025)    Food Insecurity      Within the past 12 months, did you worry that your food would run out before you got money to buy more?: No      Within the past 12 months, did the food you bought just not last and you didn t have money to get more?: No   Transportation Needs: Low Risk  (1/22/2025)    Transportation Needs      Within the past 12 months, has lack of transportation kept you from medical appointments, getting your medicines, non-medical meetings or appointments, work, or from getting things that you need?: No   Physical Activity: Unknown (1/22/2025)    Exercise Vital Sign      Days of Exercise per Week: 4 days      Minutes of Exercise per Session: Not on file   Stress: Stress Concern Present (1/22/2025)    [employer] [place] [employer] - Occupational Stress Questionnaire      Feeling of Stress : Rather much   Social Connections: Unknown (1/22/2025)    Social Connection and Isolation Panel [NHANES]      Frequency of Communication with Friends and Family: Not on file      Frequency of Social Gatherings with Friends and Family: Once a week      Attends Nondenominational Services: Not on file      Active Member of Clubs or Organizations: Not on file      Attends Club or Organization Meetings: Not on file      Marital Status: Not on file   Interpersonal Safety: Low Risk  (2/28/2025)    Interpersonal Safety      Do  you feel physically and emotionally safe where you currently live?: Yes      Within the past 12 months, have you been hit, slapped, kicked or otherwise physically hurt by someone?: No      Within the past 12 months, have you been humiliated or emotionally abused in other ways by your partner or ex-partner?: No   Housing Stability: Low Risk  (1/22/2025)    Housing Stability      Do you have housing? : Yes      Are you worried about losing your housing?: No            Lab Results    Chemistry/lipid CBC Cardiac Enzymes/BNP/TSH/INR   Lab Results   Component Value Date    CHOL 193 02/28/2025    HDL 42 (L) 02/28/2025    TRIG 140 02/28/2025    BUN 13.7 01/22/2025     01/22/2025    CO2 24 01/22/2025    Lab Results   Component Value Date    WBC 10.4 10/19/2024    HGB 13.3 10/19/2024    HCT 37.4 10/19/2024    MCV 84 10/19/2024     10/19/2024    Lab Results   Component Value Date    TROPONINI <0.01 02/04/2021    TSH 0.58 11/21/2024          Cardiac Problems and Cardiac Diagnostics     Most Recent Cardiac testing:  ECG Personal interpretation  10/19/2024  NSR  Nonspecific T wave    ECHO 11/5/2024  Interpretation Summary     Left ventricular size, wall motion and function are normal. The ejection  fraction is 60-65%.  Normal right ventricle size and systolic function.  No hemodynamically significant valvular abnormalities on 2D or color flow  imaging.    Cardiac CT: 11/5/2024     No evidence for coronary artery disease.     Calcium Scoring: The total Agatston score is 0. A calcium score of zero places the individual in the lowest quartile when compared to an age and gender matched control group and implies a low risk of cardiac events in the next 10 years. (less than 1% per year).         Assessment/Recommendations   Assessment:    Ms. Ricarda Echavarria is a 35 year old female with a significant past history of HTN, former smoker, who presents for follow up.      Palpitations   - 14 day ziopatch   - Some suspicion for  SVT vs. Reactive sinus tachycardia    Chest pain   - CCTA negative   - Consider precordial catch syndrome     HTN   - Well controlled   - Continue current management    RTC to be decided based on testing    The longitudinal plan of care for the diagnosis(es)/condition(s) as documented were addressed during this visit. Due to the added complexity in care, I will continue to support Ricarda in the subsequent management and with ongoing continuity of care.         Gary Kirby DO [facility]  Non-invasive Cardiologist  [facility]         Thank you for allowing me to participate in the care of your patient.      Sincerely,     Gary Kirby DO     [facility]  cc:   Gary Kirby DO  [address]  [place] [address]

## 2025-03-11 NOTE — PROGRESS NOTES
[facility]ARE   [address]  [place] [address]   [email or web address]   OFFICE: [phone]     CARDIOLOGY CLINIC NOTE     Thank you, Dr. Saleem, Anila Mccord, for asking the [facility] team to see Ms. Ricarda Echavarria to evaluate Follow Up          History of Present Illness   Ms. Ricarda Echavarria is a 35 year old female with a significant past history of HTN, former smoker, who presents for follow up.  Testing so far has been normal.  She notes continued L sided chest pain while taking a deep breath.  This takes a few breaths before working its way out.  Associated with severe pain.  Other than this she has episodes of palpitations described as a rapid heart beat.  These can be momentary.  She also notes one episode overnight where she woke up sweaty, heart racing, short of breath.  She does feel her dyspnea is improving.  She is getting more exercise and her tolerance is better.             Medications  Allergies   Current Outpatient Medications   Medication Sig Dispense Refill    amLODIPine (NORVASC) 10 MG tablet TAKE 1 TABLET(10 MG) BY MOUTH DAILY 90 tablet 2    famotidine (PEPCID) 20 MG tablet Take 1 tablet (20 mg) by mouth 2 times daily. 60 tablet 11    ibuprofen (ADVIL/MOTRIN) 200 MG tablet Take 200 mg by mouth as needed      omeprazole (PRILOSEC) 40 MG DR capsule Take 1 capsule (40 mg) by mouth daily. 30 capsule 3    PARoxetine (PAXIL) 10 MG tablet Take 1 tablet (10 mg) by mouth every morning. 90 tablet 4    telmisartan (MICARDIS) 20 MG tablet Take 1 tablet (20 mg) by mouth daily. 90 tablet 3    TYLENOL 325 MG OR TABS Take 650 mg by mouth as needed for headaches.  0    diphenhydrAMINE-acetaminophen (TYLENOL PM)  MG tablet Take 1 tablet by mouth nightly as needed for sleep. At bed time (Patient not taking: Reported on 3/11/2025)        Allergies   Allergen Reactions    Zofran [Ondansetron] Headache        Physical Examination Review of  Systems   Vitals: /77 (BP Location: Right arm, Patient Position: Sitting, Cuff Size: Adult Regular)   Pulse 74   Wt 76.2 kg (168 lb)   LMP  (LMP Unknown)   SpO2 99%   BMI 27.12 kg/m    BMI= Body mass index is 27.12 kg/m .  Wt Readings from Last 3 Encounters:   03/11/25 76.2 kg (168 lb)   02/28/25 73.4 kg (161 lb 12.8 oz)   02/21/25 74.8 kg (165 lb)       General: pleasant female. No acute distress.   Neck: No JVD  Lungs: clear to auscultation  COR:  regular rate and rhythm, No murmurs, rubs, or gallops  Extrem: No edema        Please refer above for cardiac ROS details.       Past History     Family History:   Family History   Problem Relation Age of Onset    Migraines Mother     Asthma Mother     Heart Disease Father     Asthma Sister     Substance Abuse Sister     Asthma Brother     Asthma Maternal Grandmother     Diabetes Type 2  Maternal Grandmother     Asthma Maternal Grandfather     Alcoholism Maternal Grandfather     Arthritis Maternal Grandfather     Clotting Disorder Maternal Grandfather     Depression Maternal Grandfather     Diabetes Maternal Grandfather     Substance Abuse Maternal Grandfather     Asthma Daughter     Asthma Son     Cancer Maternal Uncle         Social History:   Social History     Socioeconomic History    Marital status:      Spouse name: Not on file    Number of children: Not on file    Years of education: Not on file    Highest education level: Not on file   Occupational History    Not on file   Tobacco Use    Smoking status: Former     Average packs/day: 1 pack/day for 15.0 years (15.0 ttl pk-yrs)     Types: Cigarettes     Start date: 2008     Passive exposure: Never    Smokeless tobacco: Never    Tobacco comments:     Lozenges daily    Vaping Use    Vaping status: Never Used   Substance and Sexual Activity    Alcohol use: No    Drug use: Never    Sexual activity: Yes     Partners: Male     Birth control/protection: None   Other Topics Concern    Not on file   Social  History Narrative    Lives with , 3 children        Social Drivers of Health     Financial Resource Strain: Low Risk  (2/1/2025)    Received from [facility]    Financial Resource Strain     Difficulty of Paying Living Expenses: 3     Difficulty of Paying Living Expenses: Not on file   Food Insecurity: Low Risk  (1/22/2025)    Food Insecurity     Within the past 12 months, did you worry that your food would run out before you got money to buy more?: No     Within the past 12 months, did the food you bought just not last and you didn t have money to get more?: No   Transportation Needs: Low Risk  (1/22/2025)    Transportation Needs     Within the past 12 months, has lack of transportation kept you from medical appointments, getting your medicines, non-medical meetings or appointments, work, or from getting things that you need?: No   Physical Activity: Unknown (1/22/2025)    Exercise Vital Sign     Days of Exercise per Week: 4 days     Minutes of Exercise per Session: Not on file   Stress: Stress Concern Present (1/22/2025)    [employer] - Occupational Stress Questionnaire     Feeling of Stress : Rather much   Social Connections: Unknown (1/22/2025)    Social Connection and Isolation Panel [NHANES]     Frequency of Communication with Friends and Family: Not on file     Frequency of Social Gatherings with Friends and Family: Once a week     Attends Evangelical Services: Not on file     Active Member of Clubs or Organizations: Not on file     Attends Club or Organization Meetings: Not on file     Marital Status: Not on file   Interpersonal Safety: Low Risk  (2/28/2025)    Interpersonal Safety     Do you feel physically and emotionally safe where you currently live?: Yes     Within the past 12 months, have you been hit, slapped, kicked or otherwise physically hurt by someone?: No     Within the past 12 months, have you been humiliated or emotionally  abused in other ways by your partner or ex-partner?: No   Housing Stability: Low Risk  (1/22/2025)    Housing Stability     Do you have housing? : Yes     Are you worried about losing your housing?: No            Lab Results    Chemistry/lipid CBC Cardiac Enzymes/BNP/TSH/INR   Lab Results   Component Value Date    CHOL 193 02/28/2025    HDL 42 (L) 02/28/2025    TRIG 140 02/28/2025    BUN 13.7 01/22/2025     01/22/2025    CO2 24 01/22/2025    Lab Results   Component Value Date    WBC 10.4 10/19/2024    HGB 13.3 10/19/2024    HCT 37.4 10/19/2024    MCV 84 10/19/2024     10/19/2024    Lab Results   Component Value Date    TROPONINI <0.01 02/04/2021    TSH 0.58 11/21/2024          Cardiac Problems and Cardiac Diagnostics     Most Recent Cardiac testing:  ECG Personal interpretation  10/19/2024  NSR  Nonspecific T wave    ECHO 11/5/2024  Interpretation Summary     Left ventricular size, wall motion and function are normal. The ejection  fraction is 60-65%.  Normal right ventricle size and systolic function.  No hemodynamically significant valvular abnormalities on 2D or color flow  imaging.    Cardiac CT: 11/5/2024    No evidence for coronary artery disease.    Calcium Scoring: The total Agatston score is 0. A calcium score of zero places the individual in the lowest quartile when compared to an age and gender matched control group and implies a low risk of cardiac events in the next 10 years. (less than 1% per year).         Assessment/Recommendations   Assessment:    Ms. Ricarda Echavarria is a 35 year old female with a significant past history of HTN, former smoker, who presents for follow up.      Palpitations   - 14 day ziopatch   - Some suspicion for SVT vs. Reactive sinus tachycardia    Chest pain   - CCTA negative   - Consider precordial catch syndrome     HTN   - Well controlled   - Continue current management    RTC to be decided based on testing    The longitudinal plan of care for the  diagnosis(es)/condition(s) as documented were addressed during this visit. Due to the added complexity in care, I will continue to support Ricarda in the subsequent management and with ongoing continuity of care.         Gary Kirby DO [facility]  Non-invasive Cardiologist  [facility]

## 2025-03-12 ENCOUNTER — TELEPHONE (OUTPATIENT)
Dept: GASTROENTEROLOGY | Facility: CLINIC | Age: 35
End: 2025-03-12
Payer: COMMERCIAL

## 2025-03-12 NOTE — TELEPHONE ENCOUNTER
Sent [employer] (1st Attempt) and Patient Contacted for the patient to call back and schedule the following:    Appointment type: Return  Provider: VALERIA Moreno  Return date: June 2025  Specialty phone number: [phone]  Additional appointment(s) needed:   Additonal Notes:       Ok to use [employer] per Provider     3/12 AA

## 2025-03-19 ENCOUNTER — ANCILLARY PROCEDURE (OUTPATIENT)
Dept: ULTRASOUND IMAGING | Facility: CLINIC | Age: 35
End: 2025-03-19
Attending: PHYSICIAN ASSISTANT
Payer: COMMERCIAL

## 2025-03-19 DIAGNOSIS — R29.898 LEFT ARM WEAKNESS: ICD-10-CM

## 2025-03-19 PROCEDURE — 93922 UPR/L XTREMITY ART 2 LEVELS: CPT | Mod: LT | Performed by: RADIOLOGY

## 2025-03-19 PROCEDURE — 93971 EXTREMITY STUDY: CPT | Mod: LT | Performed by: RADIOLOGY

## 2025-03-27 ENCOUNTER — TELEPHONE (OUTPATIENT)
Dept: GASTROENTEROLOGY | Facility: CLINIC | Age: 35
End: 2025-03-27
Payer: COMMERCIAL

## 2025-03-27 NOTE — TELEPHONE ENCOUNTER
Non-Invasive GI Procedure Scheduling Questionnaire    Scheduling Reminders:  Add-on within 1 week require clinic approval (Manometry & HBT)  Manometry requires an in-person interrupter (not needed for HBT)      Have you had any respiratory illness or flu-like symptoms in the last 10 days?  No    Are you active on MyChart?   Yes    What insurance is in the chart?  Other:  BCBS    Ordering/Referring Provider:   JULIA VILLANUEVA    (If ordering provider performs procedure, schedule with ordering provider unless otherwise instructed. )    (Females) Are you currently pregnant? No - Okay to continue scheduling.    Have you ever had or are you awaiting a heart or lung transplant? No - Schedule next available.    Do you need assistance transferring? No - Continue scheduling.    Do you take acid reflux medication or proton-pump inhibitors (PPI)? Yes - Advise patients to discontinue acid suppression medications 2-4 weeks prior to the exam/procedure. ( Scheduled for more than 14 days out.)    Patient Reminders:  Please inform patients to review instructions sent via [employer] or letter two weeks before procedure.  The Manometry exam may take up to 90 minutes.  The Breath Test exam may take up to 4 hours.    Non - Invasive [facility]: [facility] [id number]   No

## 2025-05-01 ENCOUNTER — OFFICE VISIT (OUTPATIENT)
Dept: GASTROENTEROLOGY | Facility: CLINIC | Age: 35
End: 2025-05-01
Payer: COMMERCIAL

## 2025-05-01 VITALS — OXYGEN SATURATION: 99 % | HEART RATE: 102 BPM | SYSTOLIC BLOOD PRESSURE: 128 MMHG | DIASTOLIC BLOOD PRESSURE: 87 MMHG

## 2025-05-01 DIAGNOSIS — R07.9 CHEST PAIN, UNSPECIFIED TYPE: ICD-10-CM

## 2025-05-01 DIAGNOSIS — R13.10 DYSPHAGIA, UNSPECIFIED TYPE: ICD-10-CM

## 2025-05-01 PROCEDURE — 91037 ESOPH IMPED FUNCTION TEST: CPT | Performed by: STUDENT IN AN ORGANIZED HEALTH CARE EDUCATION/TRAINING PROGRAM

## 2025-05-01 PROCEDURE — 3074F SYST BP LT 130 MM HG: CPT | Performed by: STUDENT IN AN ORGANIZED HEALTH CARE EDUCATION/TRAINING PROGRAM

## 2025-05-01 PROCEDURE — 99207 PR NO CHARGE NURSE ONLY: CPT | Performed by: STUDENT IN AN ORGANIZED HEALTH CARE EDUCATION/TRAINING PROGRAM

## 2025-05-01 PROCEDURE — 1126F AMNT PAIN NOTED NONE PRSNT: CPT | Performed by: STUDENT IN AN ORGANIZED HEALTH CARE EDUCATION/TRAINING PROGRAM

## 2025-05-01 PROCEDURE — 91010 ESOPHAGUS MOTILITY STUDY: CPT | Performed by: STUDENT IN AN ORGANIZED HEALTH CARE EDUCATION/TRAINING PROGRAM

## 2025-05-01 PROCEDURE — 3079F DIAST BP 80-89 MM HG: CPT | Performed by: STUDENT IN AN ORGANIZED HEALTH CARE EDUCATION/TRAINING PROGRAM

## 2025-05-01 ASSESSMENT — PAIN SCALES - GENERAL: PAINLEVEL_OUTOF10: NO PAIN (0)
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
## 2025-05-01 NOTE — PATIENT INSTRUCTIONS
Esophogeal Manometry Study  1. Resume regular diet.  2. You may have a bloody nose or sore throat after the procedure.  3. If you have questions call [phone] from 7:00am-5:00pm.  For afterhours questions call GI doctor on call at [phone].

## 2025-05-21 ENCOUNTER — HOSPITAL ENCOUNTER (EMERGENCY)
Facility: CLINIC | Age: 35
Discharge: HOME | End: 2025-05-21
Attending: EMERGENCY MEDICINE | Admitting: EMERGENCY MEDICINE
Payer: COMMERCIAL

## 2025-05-21 VITALS
SYSTOLIC BLOOD PRESSURE: 158 MMHG | DIASTOLIC BLOOD PRESSURE: 96 MMHG | WEIGHT: 165 LBS | TEMPERATURE: 97.5 F | OXYGEN SATURATION: 100 % | BODY MASS INDEX: 27.49 KG/M2 | RESPIRATION RATE: 20 BRPM | HEART RATE: 122 BPM | HEIGHT: 65 IN

## 2025-05-21 DIAGNOSIS — R07.89 ATYPICAL CHEST PAIN: ICD-10-CM

## 2025-05-21 LAB
ATRIAL RATE - MUSE: 87 BPM
DIASTOLIC BLOOD PRESSURE - MUSE: NORMAL MMHG
INTERPRETATION ECG - MUSE: NORMAL
LIPASE SERPL-CCNC: 33 U/L (ref 13–60)
P AXIS - MUSE: 44 DEGREES
PR INTERVAL - MUSE: 134 MS
QRS DURATION - MUSE: 78 MS
QT - MUSE: 346 MS
QTC - MUSE: 416 MS
R AXIS - MUSE: 54 DEGREES
SYSTOLIC BLOOD PRESSURE - MUSE: NORMAL MMHG
T AXIS - MUSE: 29 DEGREES
TROPONIN T SERPL HS-MCNC: <6 NG/L
VENTRICULAR RATE- MUSE: 87 BPM

## 2025-05-21 PROCEDURE — 83690 ASSAY OF LIPASE: CPT | Performed by: EMERGENCY MEDICINE

## 2025-05-21 PROCEDURE — 93005 ELECTROCARDIOGRAM TRACING: CPT | Performed by: EMERGENCY MEDICINE

## 2025-05-21 PROCEDURE — 99284 EMERGENCY DEPT VISIT MOD MDM: CPT

## 2025-05-21 PROCEDURE — 36415 COLL VENOUS BLD VENIPUNCTURE: CPT | Performed by: EMERGENCY MEDICINE

## 2025-05-21 PROCEDURE — 84484 ASSAY OF TROPONIN QUANT: CPT | Performed by: EMERGENCY MEDICINE

## 2025-05-21 ASSESSMENT — ACTIVITIES OF DAILY LIVING (ADL): ADLS_ACUITY_SCORE: 41

## 2025-05-22 NOTE — ED TRIAGE NOTES
Patient presents to the ED complaining of chest spasms that are more into her left armpit.  She admits she has underlying anxiety.  She states the pain is on and off and is tolerable.     Triage Assessment (Adult)       Row Name 05/21/25 2020          Triage Assessment    Airway WDL WDL        Respiratory WDL    Respiratory WDL WDL        Skin Circulation/Temperature WDL    Skin Circulation/Temperature WDL WDL        Cardiac WDL    Cardiac WDL X;chest pain        Chest Pain Assessment    Chest Pain Location anterior chest, left        Peripheral/Neurovascular WDL    Peripheral Neurovascular WDL WDL        Cognitive/Neuro/Behavioral WDL    Cognitive/Neuro/Behavioral WDL WDL
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
## 2025-07-13 DIAGNOSIS — R10.13 EPIGASTRIC PAIN: ICD-10-CM

## 2025-07-13 RX ORDER — OMEPRAZOLE 40 MG/1
40 CAPSULE, DELAYED RELEASE ORAL DAILY
Qty: 90 CAPSULE | Refills: 3 | Status: SHIPPED | OUTPATIENT
Start: 2025-07-13

## 2025-08-04 ENCOUNTER — E-VISIT (OUTPATIENT)
Dept: URGENT CARE | Facility: CLINIC | Age: 35
End: 2025-08-04
Payer: COMMERCIAL

## 2025-08-04 DIAGNOSIS — N39.0 ACUTE UTI (URINARY TRACT INFECTION): Primary | ICD-10-CM

## 2025-08-04 RX ORDER — NITROFURANTOIN 25; 75 MG/1; MG/1
100 CAPSULE ORAL 2 TIMES DAILY
Qty: 10 CAPSULE | Refills: 0 | Status: SHIPPED | OUTPATIENT
Start: 2025-08-04 | End: 2025-08-09

## 2025-08-07 ENCOUNTER — OFFICE VISIT (OUTPATIENT)
Dept: SLEEP MEDICINE | Facility: CLINIC | Age: 35
End: 2025-08-07
Payer: COMMERCIAL

## 2025-08-07 VITALS
HEART RATE: 87 BPM | DIASTOLIC BLOOD PRESSURE: 77 MMHG | SYSTOLIC BLOOD PRESSURE: 125 MMHG | BODY MASS INDEX: 27.08 KG/M2 | HEIGHT: 66 IN | RESPIRATION RATE: 14 BRPM | WEIGHT: 168.5 LBS | OXYGEN SATURATION: 98 %

## 2025-08-07 DIAGNOSIS — G43.519 INTRACTABLE PERSISTENT MIGRAINE AURA WITHOUT CEREBRAL INFARCTION AND WITHOUT STATUS MIGRAINOSUS: ICD-10-CM

## 2025-08-07 DIAGNOSIS — F51.04 CHRONIC INSOMNIA: ICD-10-CM

## 2025-08-07 DIAGNOSIS — R45.1 MOTOR RESTLESSNESS: Primary | ICD-10-CM

## 2025-08-07 RX ORDER — GABAPENTIN 300 MG/1
300 CAPSULE ORAL AT BEDTIME
Qty: 60 CAPSULE | Refills: 5 | Status: SHIPPED | OUTPATIENT
Start: 2025-08-07

## 2025-08-07 ASSESSMENT — SLEEP AND FATIGUE QUESTIONNAIRES
HOW LIKELY ARE YOU TO NOD OFF OR FALL ASLEEP WHILE SITTING AND TALKING TO SOMEONE: WOULD NEVER DOZE
HOW LIKELY ARE YOU TO NOD OFF OR FALL ASLEEP IN A CAR, WHILE STOPPED FOR A FEW MINUTES IN TRAFFIC: WOULD NEVER DOZE
HOW LIKELY ARE YOU TO NOD OFF OR FALL ASLEEP WHILE SITTING AND READING: WOULD NEVER DOZE
HOW LIKELY ARE YOU TO NOD OFF OR FALL ASLEEP WHILE WATCHING TV: WOULD NEVER DOZE
HOW LIKELY ARE YOU TO NOD OFF OR FALL ASLEEP WHILE SITTING INACTIVE IN A PUBLIC PLACE: WOULD NEVER DOZE
HOW LIKELY ARE YOU TO NOD OFF OR FALL ASLEEP WHILE LYING DOWN TO REST IN THE AFTERNOON WHEN CIRCUMSTANCES PERMIT: WOULD NEVER DOZE
HOW LIKELY ARE YOU TO NOD OFF OR FALL ASLEEP WHILE SITTING QUIETLY AFTER LUNCH WITHOUT ALCOHOL: WOULD NEVER DOZE
HOW LIKELY ARE YOU TO NOD OFF OR FALL ASLEEP WHEN YOU ARE A PASSENGER IN A CAR FOR AN HOUR WITHOUT A BREAK: WOULD NEVER DOZE

## 2025-08-08 ENCOUNTER — HOSPITAL ENCOUNTER (EMERGENCY)
Facility: HOSPITAL | Age: 35
Discharge: HOME | End: 2025-08-08
Admitting: EMERGENCY MEDICINE
Payer: COMMERCIAL

## 2025-08-08 VITALS
WEIGHT: 167.2 LBS | OXYGEN SATURATION: 98 % | HEIGHT: 65 IN | DIASTOLIC BLOOD PRESSURE: 74 MMHG | BODY MASS INDEX: 27.86 KG/M2 | TEMPERATURE: 98.4 F | SYSTOLIC BLOOD PRESSURE: 120 MMHG | HEART RATE: 91 BPM | RESPIRATION RATE: 18 BRPM

## 2025-08-08 DIAGNOSIS — R51.9 HEADACHE: Primary | ICD-10-CM

## 2025-08-08 LAB
ANION GAP SERPL CALCULATED.3IONS-SCNC: 12 MMOL/L (ref 7–15)
BUN SERPL-MCNC: 11.2 MG/DL (ref 6–20)
CALCIUM SERPL-MCNC: 9.4 MG/DL (ref 8.8–10.4)
CHLORIDE SERPL-SCNC: 102 MMOL/L (ref 98–107)
CREAT SERPL-MCNC: 0.8 MG/DL (ref 0.51–0.95)
EGFRCR SERPLBLD CKD-EPI 2021: >90 ML/MIN/1.73M2
ERYTHROCYTE [DISTWIDTH] IN BLOOD BY AUTOMATED COUNT: 11.6 % (ref 10–15)
GLUCOSE SERPL-MCNC: 88 MG/DL (ref 70–99)
HCO3 SERPL-SCNC: 22 MMOL/L (ref 22–29)
HCT VFR BLD AUTO: 36.1 % (ref 35–47)
HGB BLD-MCNC: 12.2 G/DL (ref 11.7–15.7)
MAGNESIUM SERPL-MCNC: 2.1 MG/DL (ref 1.7–2.3)
MCH RBC QN AUTO: 29.3 PG (ref 26.5–33)
MCHC RBC AUTO-ENTMCNC: 33.8 G/DL (ref 31.5–36.5)
MCV RBC AUTO: 87 FL (ref 78–100)
PLATELET # BLD AUTO: 260 10E3/UL (ref 150–450)
POTASSIUM SERPL-SCNC: 4 MMOL/L (ref 3.4–5.3)
RBC # BLD AUTO: 4.17 10E6/UL (ref 3.8–5.2)
SODIUM SERPL-SCNC: 136 MMOL/L (ref 135–145)
WBC # BLD AUTO: 6.1 10E3/UL (ref 4–11)

## 2025-08-08 PROCEDURE — 36415 COLL VENOUS BLD VENIPUNCTURE: CPT | Performed by: EMERGENCY MEDICINE

## 2025-08-08 PROCEDURE — 99284 EMERGENCY DEPT VISIT MOD MDM: CPT | Mod: 25

## 2025-08-08 PROCEDURE — 250N000011 HC RX IP 250 OP 636: Performed by: EMERGENCY MEDICINE

## 2025-08-08 PROCEDURE — 96361 HYDRATE IV INFUSION ADD-ON: CPT

## 2025-08-08 PROCEDURE — 258N000003 HC RX IP 258 OP 636: Performed by: EMERGENCY MEDICINE

## 2025-08-08 PROCEDURE — 85014 HEMATOCRIT: CPT | Performed by: EMERGENCY MEDICINE

## 2025-08-08 PROCEDURE — 80048 BASIC METABOLIC PNL TOTAL CA: CPT | Performed by: EMERGENCY MEDICINE

## 2025-08-08 PROCEDURE — 96375 TX/PRO/DX INJ NEW DRUG ADDON: CPT

## 2025-08-08 PROCEDURE — 96374 THER/PROPH/DIAG INJ IV PUSH: CPT

## 2025-08-08 PROCEDURE — 250N000013 HC RX MED GY IP 250 OP 250 PS 637: Performed by: EMERGENCY MEDICINE

## 2025-08-08 PROCEDURE — 83735 ASSAY OF MAGNESIUM: CPT | Performed by: EMERGENCY MEDICINE

## 2025-08-08 RX ORDER — KETOROLAC TROMETHAMINE 15 MG/ML
15 INJECTION, SOLUTION INTRAMUSCULAR; INTRAVENOUS ONCE
Status: DISCONTINUED | OUTPATIENT
Start: 2025-08-08 | End: 2025-08-08

## 2025-08-08 RX ORDER — METOCLOPRAMIDE HYDROCHLORIDE 5 MG/ML
10 INJECTION INTRAMUSCULAR; INTRAVENOUS ONCE
Status: COMPLETED | OUTPATIENT
Start: 2025-08-08 | End: 2025-08-08

## 2025-08-08 RX ORDER — DEXAMETHASONE SODIUM PHOSPHATE 10 MG/ML
10 INJECTION, SOLUTION INTRAMUSCULAR; INTRAVENOUS ONCE
Status: COMPLETED | OUTPATIENT
Start: 2025-08-08 | End: 2025-08-08

## 2025-08-08 RX ORDER — ACETAMINOPHEN 500 MG
1000 TABLET ORAL ONCE
Status: COMPLETED | OUTPATIENT
Start: 2025-08-08 | End: 2025-08-08

## 2025-08-08 RX ORDER — DIPHENHYDRAMINE HYDROCHLORIDE 50 MG/ML
25 INJECTION, SOLUTION INTRAMUSCULAR; INTRAVENOUS ONCE
Status: COMPLETED | OUTPATIENT
Start: 2025-08-08 | End: 2025-08-08

## 2025-08-08 RX ADMIN — DEXAMETHASONE SODIUM PHOSPHATE 10 MG: 10 INJECTION INTRAMUSCULAR; INTRAVENOUS at 09:34

## 2025-08-08 RX ADMIN — DIPHENHYDRAMINE HYDROCHLORIDE 25 MG: 50 INJECTION, SOLUTION INTRAMUSCULAR; INTRAVENOUS at 09:34

## 2025-08-08 RX ADMIN — ACETAMINOPHEN 1000 MG: 500 TABLET ORAL at 09:33

## 2025-08-08 RX ADMIN — METOCLOPRAMIDE 10 MG: 5 INJECTION, SOLUTION INTRAMUSCULAR; INTRAVENOUS at 09:34

## 2025-08-08 RX ADMIN — SODIUM CHLORIDE 1000 ML: 0.9 INJECTION, SOLUTION INTRAVENOUS at 09:29

## 2025-08-08 ASSESSMENT — COLUMBIA-SUICIDE SEVERITY RATING SCALE - C-SSRS
2. HAVE YOU ACTUALLY HAD ANY THOUGHTS OF KILLING YOURSELF IN THE PAST MONTH?: NO
6. HAVE YOU EVER DONE ANYTHING, STARTED TO DO ANYTHING, OR PREPARED TO DO ANYTHING TO END YOUR LIFE?: NO
1. IN THE PAST MONTH, HAVE YOU WISHED YOU WERE DEAD OR WISHED YOU COULD GO TO SLEEP AND NOT WAKE UP?: NO

## 2025-08-08 ASSESSMENT — ACTIVITIES OF DAILY LIVING (ADL)
ADLS_ACUITY_SCORE: 41
ADLS_ACUITY_SCORE: 41

## 2025-08-11 ENCOUNTER — PATIENT OUTREACH (OUTPATIENT)
Dept: CARE COORDINATION | Facility: CLINIC | Age: 35
End: 2025-08-11
Payer: COMMERCIAL

## (undated) DEVICE — GOWN IMPERVIOUS 2XL BLUE

## (undated) DEVICE — TUBING SUCTION MEDI-VAC 1/4"X20' N620A

## (undated) DEVICE — ENDO BITE BLOCK ADULT OMNI-BLOC

## (undated) DEVICE — SUCTION MANIFOLD NEPTUNE 2 SYS 1 PORT 702-025-000

## (undated) DEVICE — SOL WATER IRRIG 500ML BOTTLE 2F7113

## (undated) DEVICE — KIT ENDO TURNOVER/PROCEDURE CARRY-ON 101822

## (undated) DEVICE — SPECIMEN CONTAINER 3OZ W/FORMALIN 59901

## (undated) DEVICE — GLOVE EXAM NITRILE LG PF LATEX FREE 5064

## (undated) DEVICE — SYR 50ML SLIP TIP W/O NDL 309654

## (undated) DEVICE — ENDO FORCEP BX CAPTURA PRO SPIKE G50696

## (undated) RX ORDER — LIDOCAINE HYDROCHLORIDE 20 MG/ML
JELLY TOPICAL
Status: DISPENSED
Start: 2025-05-01